# Patient Record
Sex: FEMALE | Race: WHITE | NOT HISPANIC OR LATINO | Employment: FULL TIME | URBAN - NONMETROPOLITAN AREA
[De-identification: names, ages, dates, MRNs, and addresses within clinical notes are randomized per-mention and may not be internally consistent; named-entity substitution may affect disease eponyms.]

---

## 2017-03-06 ENCOUNTER — OFFICE VISIT (OUTPATIENT)
Dept: FAMILY MEDICINE | Facility: OTHER | Age: 27
End: 2017-03-06
Attending: FAMILY MEDICINE
Payer: COMMERCIAL

## 2017-03-06 VITALS
BODY MASS INDEX: 32.49 KG/M2 | HEART RATE: 98 BPM | SYSTOLIC BLOOD PRESSURE: 124 MMHG | DIASTOLIC BLOOD PRESSURE: 72 MMHG | HEIGHT: 65 IN | WEIGHT: 195 LBS | RESPIRATION RATE: 20 BRPM | OXYGEN SATURATION: 98 %

## 2017-03-06 DIAGNOSIS — Z00.00 ROUTINE GENERAL MEDICAL EXAMINATION AT A HEALTH CARE FACILITY: Primary | ICD-10-CM

## 2017-03-06 DIAGNOSIS — Z30.41 VISIT FOR BIRTH CONTROL PILLS MAINTENANCE: ICD-10-CM

## 2017-03-06 PROCEDURE — 99395 PREV VISIT EST AGE 18-39: CPT | Performed by: FAMILY MEDICINE

## 2017-03-06 PROCEDURE — G0123 SCREEN CERV/VAG THIN LAYER: HCPCS | Performed by: FAMILY MEDICINE

## 2017-03-06 ASSESSMENT — ANXIETY QUESTIONNAIRES
7. FEELING AFRAID AS IF SOMETHING AWFUL MIGHT HAPPEN: NOT AT ALL
2. NOT BEING ABLE TO STOP OR CONTROL WORRYING: NOT AT ALL
5. BEING SO RESTLESS THAT IT IS HARD TO SIT STILL: NOT AT ALL
6. BECOMING EASILY ANNOYED OR IRRITABLE: NOT AT ALL
1. FEELING NERVOUS, ANXIOUS, OR ON EDGE: NOT AT ALL
3. WORRYING TOO MUCH ABOUT DIFFERENT THINGS: NOT AT ALL
IF YOU CHECKED OFF ANY PROBLEMS ON THIS QUESTIONNAIRE, HOW DIFFICULT HAVE THESE PROBLEMS MADE IT FOR YOU TO DO YOUR WORK, TAKE CARE OF THINGS AT HOME, OR GET ALONG WITH OTHER PEOPLE: NOT DIFFICULT AT ALL
GAD7 TOTAL SCORE: 0

## 2017-03-06 ASSESSMENT — PAIN SCALES - GENERAL: PAINLEVEL: NO PAIN (0)

## 2017-03-06 ASSESSMENT — PATIENT HEALTH QUESTIONNAIRE - PHQ9: 5. POOR APPETITE OR OVEREATING: NOT AT ALL

## 2017-03-06 NOTE — LETTER
[facility] [place]  [address]  [facility] [address]  [phone]        March 13, 2017    Fawn Davis  [address] [place] [address]          Dear Fawn Davis    Your pap smear is normal.  It is generally recommended that women have a yearly pelvic exam.  If your provider  requested that you have a pap smear more frequently because of any previous problems please schedule that appointment as requested.  If you have any questions please call the clinic at [phone].      Sincerely,        Fransisca Sales MD

## 2017-03-06 NOTE — PROGRESS NOTES
[facility]  Family Practice Well Woman Exam          HPI:   Fawn Davis, 26 year old, female presents with   Chief Complaint   Patient presents with     Physical  Doing well with no concerns. Continuing on Shree bcp. After switching from generic to name brand she noted that she was more irritable but this has now resolved. No break through bleeding              PMH:     Past Medical History   Diagnosis Date     Contraceptive use              PSH:     Past Surgical History   Procedure Laterality Date     Fractured arm       Tonsillectomy       Left and right bunionectomy       [place] teeth extraction       Bilateral > pe tubes              Medications:     Current Outpatient Prescriptions Ordered in Epic   Medication     drospirenone-ethinyl estradiol (SHREE) 3-0.02 MG per tablet     folic acid (FOLVITE) 1 MG tablet     No current Epic-ordered facility-administered medications on file.              Allergies:   Penicillins          Social History     Social History     Social History     Marital status:      Spouse name: N/A     Number of children: N/A     Years of education: N/A     Occupational History     Not on file.     Social History Main Topics     Smoking status: Never Smoker     Smokeless tobacco: Never Used     Alcohol use Yes      Comment: Socially     Drug use: No     Sexual activity: Not on file     Other Topics Concern      Service No     Caffeine Concern Yes     SODA     Hobby Hazards No     Weight Concern No     Back Care No     Seat Belt Yes     Parent/Sibling W/ Cabg, Mi Or Angioplasty Before 65f 55m? No     Social History Narrative              Family History     Family History   Problem Relation Age of Onset     HEART DISEASE Sister      murmer     Hyperlipidemia Mother             Review of Systems:   The 10 point Review of Systems is negative other than noted in the HPI             Physical Exam:   Vitals were reviewed  Blood pressure 124/72, pulse 98, resp. rate  "20, height 5' 5\" (1.651 m), weight 195 lb (88.5 kg), last menstrual period 02/27/2017, SpO2 98 %, unknown if currently breastfeeding.    Constitutional:   awake, alert, cooperative, no apparent distress, and appears stated age   Eyes:   Lids and lashes normal, pupils equal, round and reactive to light, extra ocular muscles intact, sclera clear, conjunctiva normal   ENT:   Normocephalic, without obvious abnormality, atraumatic, sinuses nontender on palpation, external ears without lesions, oral pharynx with moist mucous membranes, tonsils without erythema or exudates, gums normal and good dentition.   Neck:   Supple, symmetrical, trachea midline, no adenopathy, thyroid symmetric, not enlarged and no tenderness, skin normal   Hematologic / Lymphatic:   no cervical lymphadenopathy and no supraclavicular lymphadenopathy   Back:   Symmetric, no curvature, spinous processes are non-tender on palpation, paraspinous muscles are non-tender on palpation, no costal vertebral tenderness   Lungs:   No increased work of breathing, good air exchange, clear to auscultation bilaterally, no crackles or wheezing   Cardiovascular:   Normal apical impulse, regular rate and rhythm, normal S1 and S2, no S3 or S4, and no murmur noted   Chest / Breast:   Breasts symmetrical, skin without lesion(s), no nipple retraction or dimpling, no nipple discharge, no masses palpated, no axillary or supraclavicular adenopathy     Abdomen:   No scars, normal bowel sounds, soft, non-distended, non-tender, no masses palpated, no hepatosplenomegally   Genitounirinary:   External Genitalia:  General appearance; normal, Hair distribution; normal, Lesions absent  Vagina:  General appearance normal, Estrogen effect normal, Discharge absent  Cervix:  General appearance normal, Lesions absent, Discharge absent  Uterus:  Size normal, Contour normal, Position normal  Adenexa:  Tenderness absent  Anus/Perineum:  Lesions absent   Musculoskeletal:   There is no " redness, warmth, or swelling of the joints.  Full range of motion noted.  Motor strength is 5 out of 5 all extremities bilaterally.  Tone is normal.   Neurologic:   Awake, alert, oriented to name, place and time.  Cranial nerves II-XII are grossly intact.  Motor is 5 out of 5 bilaterally.  Cerebellar finger to nose, heel to shin intact.  Sensory is intact.  Babinski down going, Romberg negative, and gait is normal.   Neuropsychiatric:   General: normal, calm and normal eye contact   Skin:   no redness, warmth, or swelling          Data:                Assessment and Plan:     ICD-10-CM    1. Routine general medical examination at a health care facility Z00.00 Doing well, pap done. Recheck one year   2. Visit for birth control pills maintenance Z30.41 Continue folic acid as well which we discussed        Fransisca Sales MD  3/6/2017  9:24 AM

## 2017-03-06 NOTE — NURSING NOTE
"Chief Complaint   Patient presents with     Physical       Initial /72  Pulse 98  Resp 20  Ht 5' 5\" (1.651 m)  Wt 195 lb (88.5 kg)  LMP 02/27/2017 (Approximate)  SpO2 98%  Breastfeeding? Unknown  BMI 32.45 kg/m2 Estimated body mass index is 32.45 kg/(m^2) as calculated from the following:    Height as of this encounter: 5' 5\" (1.651 m).    Weight as of this encounter: 195 lb (88.5 kg).  Medication Reconciliation: complete   Keila Graham      "

## 2017-03-06 NOTE — MR AVS SNAPSHOT
"              After Visit Summary   3/6/2017    Fawn Davis    MRN: [id number]           Patient Information     Date Of Birth          1990        Visit Information        Provider Department      3/6/2017 9:00 AM Fransisca Sales MD [facility]        Today's Diagnoses     Routine general medical examination at a health care facility    -  1    Visit for birth control pills maintenance           Follow-ups after your visit        Follow-up notes from your care team     Return in about 1 year (around 3/6/2018).      Who to contact     If you have questions or need follow up information about today's clinic visit or your schedule please contact [facility] directly at [phone].  Normal or non-critical lab and imaging results will be communicated to you by MyChart, letter or phone within 4 business days after the clinic has received the results. If you do not hear from us within 7 days, please contact the clinic through MyChart or phone. If you have a critical or abnormal lab result, we will notify you by phone as soon as possible.  Submit refill requests through [employer] or call your pharmacy and they will forward the refill request to us. Please allow 3 business days for your refill to be completed.          Additional Information About Your Visit        MyChart Information     [employer] lets you send messages to your doctor, view your test results, renew your prescriptions, schedule appointments and more. To sign up, go to [email or web address] . Click on \"Log in\" on the left side of the screen, which will take you to the Welcome page. Then click on \"Sign up Now\" on the right side of the page.     You will be asked to enter the access code listed below, as well as some personal information. Please follow the directions to create your username and password.     Your access code is: [email or web address]  Expires: 2017  9:28 AM     Your access code will  in 90 days. If you need " "help or a new code, please call your [facility] clinic or [phone].        Care EveryWhere ID     This is your Care EveryWhere ID. This could be used by other organizations to access your [facility] medical records  [id number]        Your Vitals Were     Pulse Respirations Height Last Period Pulse Oximetry Breastfeeding?    98 20 5' 5\" (1.651 m) 02/27/2017 (Approximate) 98% Unknown    BMI (Body Mass Index)                   32.45 kg/m2            Blood Pressure from Last 3 Encounters:   03/06/17 124/72   02/23/16 120/78   01/26/16 138/80    Weight from Last 3 Encounters:   03/06/17 195 lb (88.5 kg)   02/23/16 192 lb (87.1 kg)   01/26/16 188 lb (85.3 kg)              We Performed the Following     A pap thin layer screen reflex to HPV if ASCUS - recommend age 25 - 29        Primary Care Provider Office Phone # Fax #    Fransisca Sales [phone]       [facility] [place] [address]  [place] [address]        Thank you!     Thank you for choosing [facility] [place]  for your care. Our goal is always to provide you with excellent care. Hearing back from our patients is one way we can continue to improve our services. Please take a few minutes to complete the written survey that you may receive in the mail after your visit with us. Thank you!             Your Updated Medication List - Protect others around you: Learn how to safely use, store and throw away your medicines at www.disposemymeds.org.          This list is accurate as of: 3/6/17  9:35 AM.  Always use your most recent med list.                   Brand Name Dispense Instructions for use    drospirenone-ethinyl estradiol 3-0.02 MG per tablet    SHREE    84 tablet    Take 1 tablet by mouth daily       folic acid 1 MG tablet    FOLVITE    100 tablet    Take 1 tablet (1 mg) by mouth daily         "

## 2017-03-07 ASSESSMENT — PATIENT HEALTH QUESTIONNAIRE - PHQ9: SUM OF ALL RESPONSES TO PHQ QUESTIONS 1-9: 0

## 2017-03-07 ASSESSMENT — ANXIETY QUESTIONNAIRES: GAD7 TOTAL SCORE: 0

## 2017-03-13 LAB
COPATH REPORT: NORMAL
PAP: NORMAL

## 2017-04-11 DIAGNOSIS — Z30.011 ENCOUNTER FOR INITIAL PRESCRIPTION OF CONTRACEPTIVE PILLS: ICD-10-CM

## 2017-04-11 NOTE — TELEPHONE ENCOUNTER
Lenore      Last Written Prescription Date: 1/14/2017  Last Fill Quantity: 84,  # refills: 0   Last Office Visit with G, [facility] or [facility] prescribing provider: 3/06/2017

## 2017-04-12 RX ORDER — ETHINYL ESTRADIOL/DROSPIRENONE 0.02-3(28)
TABLET ORAL
Qty: 84 TABLET | Refills: 1 | Status: SHIPPED | OUTPATIENT
Start: 2017-04-12 | End: 2018-02-27

## 2018-02-27 ENCOUNTER — PRENATAL OFFICE VISIT (OUTPATIENT)
Dept: OBGYN | Facility: OTHER | Age: 28
End: 2018-02-27
Attending: OBSTETRICS & GYNECOLOGY
Payer: COMMERCIAL

## 2018-02-27 VITALS
SYSTOLIC BLOOD PRESSURE: 126 MMHG | HEIGHT: 65 IN | DIASTOLIC BLOOD PRESSURE: 77 MMHG | WEIGHT: 198 LBS | OXYGEN SATURATION: 98 % | HEART RATE: 81 BPM | BODY MASS INDEX: 32.99 KG/M2

## 2018-02-27 DIAGNOSIS — Z34.81 ENCOUNTER FOR SUPERVISION OF OTHER NORMAL PREGNANCY, FIRST TRIMESTER: Primary | ICD-10-CM

## 2018-02-27 PROCEDURE — 76817 TRANSVAGINAL US OBSTETRIC: CPT | Performed by: OBSTETRICS & GYNECOLOGY

## 2018-02-27 PROCEDURE — 99207 ZZC PRENATAL VISIT: CPT | Mod: 25 | Performed by: OBSTETRICS & GYNECOLOGY

## 2018-02-27 ASSESSMENT — PAIN SCALES - GENERAL: PAINLEVEL: NO PAIN (0)

## 2018-02-27 NOTE — PROGRESS NOTES
"  HPI:  Fawn Davis is a 27 year old female Gravida2 Para1 () Patient's last menstrual period was 2018. at Unknown, Estimated Date of Delivery: Data Unavailable.  She denies vaginal bleeding, nausea , vomiting and abdominal pain.   No other c/o.    Past Medical History:   Diagnosis Date     Contraceptive use        Past Surgical History:   Procedure Laterality Date     BILATERAL > PE TUBES       Fractured Arm       Left and Right Bunionectomy       TONSILLECTOMY       [place] Teeth Extraction         Allergies: Penicillins     EXAM:  Blood pressure 126/77, pulse 81, height 5' 5\" (1.651 m), weight 198 lb (89.8 kg), last menstrual period 2018, SpO2 98 %, unknown if currently breastfeeding.   BMI= Body mass index is 32.95 kg/(m^2).  General - pleasant female in no acute distress.  Abdomen - soft, nontender, nondistended, no hepatosplenomegaly.  Pelvic - EG: normal adult female, BUS: within normal limits,Rectovaginal - deferred.    US:    Transvaginal:Yes  Yolk sac present:Yes  CRL:  18mm  FCA present:Yes  EGA 8w 2d  MAURA:CWD          ASSESSMENT/PLAN:  (Z34.81) Encounter for supervision of other normal pregnancy, first trimester  (primary encounter diagnosis)  Comment: viable IUP with concordant dates  Plan: ABO/Rh type and screen, HIV Antigen Antibody         Combo, Rubella Antibody IgG Quantitative,         Hepatitis B surface antigen, Anti Treponema,         Urine Culture Aerobic Bacterial, UA with         Microscopic, CBC with platelets, Drug Screen         Urine (Range)         1st Trimester precautions and testing discussed.  New OB Labs ordered and exam scheduled.  Aneuploidy testing options discussed.  Patient has my card and phone number to call prn if problems in interim.    Romero Sanchez  "

## 2018-02-27 NOTE — NURSING NOTE
"Chief Complaint   Patient presents with     Prenatal Care     Pre New LMP- 1/3/18 GA- 7 weeks and 6 days       Initial /77 (BP Location: Left arm, Cuff Size: Adult Regular)  Pulse 81  Ht 5' 5\" (1.651 m)  Wt 198 lb (89.8 kg)  LMP 01/03/2018  SpO2 98%  BMI 32.95 kg/m2 Estimated body mass index is 32.95 kg/(m^2) as calculated from the following:    Height as of this encounter: 5' 5\" (1.651 m).    Weight as of this encounter: 198 lb (89.8 kg).  Medication Reconciliation: complete     Rain Salazar      "

## 2018-02-27 NOTE — MR AVS SNAPSHOT
After Visit Summary   2/27/2018    Fawn Davis    MRN: [id number]           Patient Information     Date Of Birth          1990        Visit Information        Provider Department      2/27/2018 11:00 AM [facility]        Today's Diagnoses     Encounter for supervision of other normal pregnancy, first trimester    -  1      Care Instructions    Patient has my card and phone number to call prn if problems in interim.          Follow-ups after your visit        Your next 10 appointments already scheduled     Mar 22, 2018  1:45 PM CDT   (Arrive by 1:30 PM)   New Prenatal with Pam Colón NP   [facility] [place] ([facility] [place] )    [address]  [place] [address]   [phone]              Future tests that were ordered for you today     Open Future Orders        Priority Expected Expires Ordered    ABO/Rh type and screen Routine 3/22/2018 5/27/2018 2/27/2018    HIV Antigen Antibody Combo Routine 3/22/2018 5/27/2018 2/27/2018    Rubella Antibody IgG Quantitative Routine 3/22/2018 5/27/2018 2/27/2018    Hepatitis B surface antigen Routine 3/22/2018 5/27/2018 2/27/2018    Anti Treponema Routine 3/22/2018 5/27/2018 2/27/2018    Urine Culture Aerobic Bacterial Routine 3/22/2018 5/27/2018 2/27/2018    UA with Microscopic Routine 3/22/2018 5/27/2018 2/27/2018    CBC with platelets Routine 3/22/2018 5/27/2018 2/27/2018    Drug Screen Urine (Range) Routine 3/22/2018 5/27/2018 2/27/2018            Who to contact     If you have questions or need follow up information about today's clinic visit or your schedule please contact [facility] directly at [phone].  Normal or non-critical lab and imaging results will be communicated to you by MyChart, letter or phone within 4 business days after the clinic has received the results. If you do not hear from us within 7 days, please contact the clinic through MyChart or phone. If you  "have a critical or abnormal lab result, we will notify you by phone as soon as possible.  Submit refill requests through [email or web address] or call your pharmacy and they will forward the refill request to us. Please allow 3 business days for your refill to be completed.          Additional Information About Your Visit        [employer] Information     [email or web address] lets you send messages to your doctor, view your test results, renew your prescriptions, schedule appointments and more. To sign up, go to [email or web address] . Click on \"Log in\" on the left side of the screen, which will take you to the Welcome page. Then click on \"Sign up Now\" on the right side of the page.     You will be asked to enter the access code listed below, as well as some personal information. Please follow the directions to create your username and password.     Your access code is: [email or web address]  Expires: 2018  1:13 PM     Your access code will  in 90 days. If you need help or a new code, please call your [facility] clinic or [phone].        Care EveryWhere ID     This is your Care EveryWhere ID. This could be used by other organizations to access your [place] medical records  [id number]        Your Vitals Were     Pulse Height Last Period Pulse Oximetry BMI (Body Mass Index)       81 5' 5\" (1.651 m) 2018 98% 32.95 kg/m2        Blood Pressure from Last 3 Encounters:   18 126/77   17 124/72   16 120/78    Weight from Last 3 Encounters:   18 198 lb (89.8 kg)   17 195 lb (88.5 kg)   16 192 lb (87.1 kg)              We Performed the Following     US OB TRANSVAGINAL        Primary Care Provider Office Phone # Fax #    Fransisca Sales [phone] [address] [address]        Equal Access to Services     NIALL BAUMANN : Holly Sellers, wanato manjarrez, qaybta matthewalrosa jesus. So [facility] " [phone].    ATENCIÓN: Si rolando roberson, tiene a perkins disposición servicios gratuitos de asistencia lingüística. Letitia al [phone].    We comply with applicable federal civil rights laws and [place] laws. We do not discriminate on the basis of race, color, national origin, age, disability, sex, sexual orientation, or gender identity.            Thank you!     [facility] you [facility]  [facility] your care. Our goal is always to provide you with excellent care. Hearing back from our patients is one way we can continue to improve our services. Please take a few minutes to complete the written survey that you may receive in the mail after your visit with us. Thank you!             Your Updated Medication List - Protect others around you: Learn how to safely use, store and throw away your medicines at www.disposemymeds.org.          This list is accurate as of 2/27/18  1:13 PM.  Always use your most recent med list.                   Brand Name Dispense Instructions for use Diagnosis    folic acid 1 MG tablet    FOLVITE    100 tablet    Take 1 tablet (1 mg) by mouth daily    Routine general medical examination at a health care facility

## 2018-03-22 ENCOUNTER — PRENATAL OFFICE VISIT (OUTPATIENT)
Dept: OBGYN | Facility: OTHER | Age: 28
End: 2018-03-22
Attending: NURSE PRACTITIONER
Payer: COMMERCIAL

## 2018-03-22 VITALS
WEIGHT: 197 LBS | SYSTOLIC BLOOD PRESSURE: 118 MMHG | HEIGHT: 65 IN | BODY MASS INDEX: 32.82 KG/M2 | HEART RATE: 85 BPM | DIASTOLIC BLOOD PRESSURE: 70 MMHG | OXYGEN SATURATION: 100 %

## 2018-03-22 DIAGNOSIS — O22.00 VARICOSE VEINS OF LOWER EXTREMITY DURING PREGNANCY, ANTEPARTUM: ICD-10-CM

## 2018-03-22 DIAGNOSIS — Z34.81 ENCOUNTER FOR SUPERVISION OF OTHER NORMAL PREGNANCY IN FIRST TRIMESTER: Primary | ICD-10-CM

## 2018-03-22 DIAGNOSIS — Z34.91 NORMAL PREGNANCY IN FIRST TRIMESTER: ICD-10-CM

## 2018-03-22 PROCEDURE — 99207 ZZC PRENATAL VISIT: CPT | Performed by: NURSE PRACTITIONER

## 2018-03-22 PROCEDURE — 87491 CHLMYD TRACH DNA AMP PROBE: CPT | Performed by: NURSE PRACTITIONER

## 2018-03-22 PROCEDURE — 87591 N.GONORRHOEAE DNA AMP PROB: CPT | Performed by: NURSE PRACTITIONER

## 2018-03-22 ASSESSMENT — PAIN SCALES - GENERAL: PAINLEVEL: NO PAIN (0)

## 2018-03-22 NOTE — NURSING NOTE
"Chief Complaint   Patient presents with     Prenatal Care     New OB 11 weeks 1 day       Initial /70  Pulse 85  Ht 5' 5\" (1.651 m)  Wt 197 lb (89.4 kg)  LMP 01/03/2018  SpO2 100%  BMI 32.78 kg/m2 Estimated body mass index is 32.78 kg/(m^2) as calculated from the following:    Height as of this encounter: 5' 5\" (1.651 m).    Weight as of this encounter: 197 lb (89.4 kg).  Medication Reconciliation: complete     12 Week Visit    Patient education provided on the following:  Benefits of breastfeeding  Importance of early skin-to-skin contact    We reviewed the [place] [employer] Prenatal Toolkit in the [facility] Resource Book.  Patient questions and concerns addressed and reviewed. Support and encouragement provided.      Estefania Busby    "

## 2018-03-22 NOTE — PROGRESS NOTES
"  HPI:  Fawn Davis is a 27 year old female Patient's last menstrual period was 01/03/2018. at 11w1d, Estimated Date of Delivery: Oct 10, 2018.  She denies vaginal bleeding, vomiting, abdominal pain and headaches. Increased stress as her father is currently very ill.  Increasing varicosities in lower legs.   No other c/o.    Past Medical History:   Diagnosis Date     Contraceptive use        Past Surgical History:   Procedure Laterality Date     BILATERAL > PE TUBES       Fractured Arm       Left and Right Bunionectomy       TONSILLECTOMY       [place] Teeth Extraction         Allergies: Penicillins     EXAM:  Blood pressure 118/70, pulse 85, height 5' 5\" (1.651 m), weight 197 lb (89.4 kg), last menstrual period 01/03/2018, SpO2 100 %, unknown if currently breastfeeding.   BMI= Body mass index is 32.78 kg/(m^2).  General - pleasant female in no acute distress.  Neck - supple without lymphadenopathy or thyromegaly.  Lungs - clear to auscultation bilaterally.  Heart - regular rate and rhythm without murmur.  Abdomen - soft, nontender, nondistended, no hepatosplenomegaly.  Pelvic - EG: normal adult female, BUS: within normal limits, Vagina: well rugated, no discharge, Cervix: no lesions or CMT, closed/long Uterus: gravid, consistant with dates, mobile, Adnexae: no masses or tenderness.  Rectovaginal - deferred.  Musculoskeletal - no gross deformities.  Neurological - normal strength, sensation, and mental status.    Doptones were 166    ASSESSMENT/PLAN:  (Z34.81) Encounter for supervision of other normal pregnancy in first trimester  (primary encounter diagnosis)  Comment: new OB physical  Plan: GC/Chlamydia by PCR - [facility]            (Z34.91) Normal pregnancy in first trimester  Comment:   Plan: RTC 4 weeks.      Weight gain and exercise during pregnancy was discussed at today's visit.  The patient will return to clinic in 4 weeks for continued prenatal care.  "

## 2018-03-22 NOTE — MR AVS SNAPSHOT
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  Normal or non-critical lab and imaging results will be communicated to you by MyChart, letter or phone within 4 business days after the clinic has received the results. If you do not hear from us within 7 days, please contact the clinic through [employer]lane[employer] or phone. If you have a critical or abnormal lab result, we will notify you by phone as soon as possible.  Submit refill requests through [employer] or call your pharmacy and they will forward the refill request to us. Please allow 3 business days for your refill to be completed.          Additional Information About Your Visit        Blacklane[employer] Information     [employer] lets you send messages to your doctor, view your test results, renew your prescriptions, schedule appointments and more. To sign up, go to [email or web address] . Click on \"Log in\" on the left side of the screen, which will take you to the Welcome page. Then click on \"Sign up Now\" on the right side of " "the page.     You will be asked to enter the access code listed below, as well as some personal information. Please follow the directions to create your username and password.     Your access code is: [email or web address]  Expires: 2018  2:13 PM     Your access code will  in 90 days. If you need help or a new code, please call your [place] clinic or [phone].        Care EveryWhere ID     This is your Care EveryWhere ID. This could be used by other organizations to access your [place] medical records  [id number]        Your Vitals Were     Pulse Height Last Period Pulse Oximetry BMI (Body Mass Index)       85 5' 5\" (1.651 m) 2018 100% 32.78 kg/m2        Blood Pressure from Last 3 Encounters:   18 118/70   18 126/77   17 124/72    Weight from Last 3 Encounters:   18 197 lb (89.4 kg)   18 198 lb (89.8 kg)   17 195 lb (88.5 kg)              We Performed the Following     GC/Chlamydia by PCR - HI,        Primary Care Provider Office Phone # Fax #    Fransisca Sales -[phone]       [facility] [address]        Equal Access to Services     [facility]: Hadii evelyn post hadasho Solinda, waaxda luqadaha, qaybta kaalmada adeegyada, rosa bay . So [facility] [phone].    ATENCIÓN: Si habla español, tiene a perkins disposición servicios gratuitos de asistencia lingüística. Llame al [phone].    We comply with applicable federal civil rights laws and [place] laws. We do not discriminate on the basis of race, color, national origin, age, disability, sex, sexual orientation, or gender identity.            Thank you!     Thank you for choosing [facility]  for your care. Our goal is always to provide you with excellent care. Hearing back from our patients is one way we can continue to improve our services. Please take a few minutes to complete the written survey that you may receive in the mail after " your visit with us. Thank you!             Your Updated Medication List - Protect others around you: Learn how to safely use, store and throw away your medicines at www.disposemymeds.org.          This list is accurate as of 3/22/18  2:11 PM.  Always use your most recent med list.                   Brand Name Dispense Instructions for use Diagnosis    folic acid 1 MG tablet    FOLVITE    100 tablet    Take 1 tablet (1 mg) by mouth daily    Routine general medical examination at a health care facility

## 2018-03-23 LAB
C TRACH DNA SPEC QL PROBE+SIG AMP: NOT DETECTED
N GONORRHOEA DNA SPEC QL PROBE+SIG AMP: NOT DETECTED
SPECIMEN SOURCE: NORMAL

## 2018-03-23 ASSESSMENT — PATIENT HEALTH QUESTIONNAIRE - PHQ9: SUM OF ALL RESPONSES TO PHQ QUESTIONS 1-9: 1

## 2018-04-19 ENCOUNTER — PRENATAL OFFICE VISIT (OUTPATIENT)
Dept: OBGYN | Facility: OTHER | Age: 28
End: 2018-04-19
Attending: NURSE PRACTITIONER
Payer: COMMERCIAL

## 2018-04-19 VITALS
HEIGHT: 65 IN | SYSTOLIC BLOOD PRESSURE: 120 MMHG | BODY MASS INDEX: 32.82 KG/M2 | OXYGEN SATURATION: 99 % | HEART RATE: 95 BPM | WEIGHT: 197 LBS | DIASTOLIC BLOOD PRESSURE: 74 MMHG

## 2018-04-19 DIAGNOSIS — Z34.92 NORMAL PREGNANCY IN SECOND TRIMESTER: Primary | ICD-10-CM

## 2018-04-19 PROCEDURE — 99207 ZZC PRENATAL VISIT: CPT | Performed by: NURSE PRACTITIONER

## 2018-04-19 ASSESSMENT — PAIN SCALES - GENERAL: PAINLEVEL: NO PAIN (0)

## 2018-04-19 NOTE — MR AVS SNAPSHOT
"              After Visit Summary   4/19/2018    Fawn Davis    MRN: [id number]           Patient Information     Date Of Birth          1990        Visit Information        Provider Department      4/19/2018 1:00 PM Pam Colón NP [facility] [place]        Today's Diagnoses     Normal pregnancy in second trimester    -  1      Care Instructions    RTC 4 weeks          Follow-ups after your visit        Your next 10 appointments already scheduled     May 17, 2018  1:00 PM CDT   (Arrive by 12:45 PM)   ESTABLISHED PRENATAL with Pam Colón NP   [facility] [place] ([facility] [place] )    [address]  [place] [address]   [phone]              Future tests that were ordered for you today     Open Future Orders        Priority Expected Expires Ordered    US OB > 14 Weeks Complete Single ([place]) Routine 5/24/2018 4/19/2019 4/19/2018            Who to contact     If you have questions or need follow up information about today's clinic visit or your schedule please contact [facility] directly at [phone].  Normal or non-critical lab and imaging results will be communicated to you by MyChart, letter or phone within 4 business days after the clinic has received the results. If you do not hear from us within 7 days, please contact the clinic through MyChart or phone. If you have a critical or abnormal lab result, we will notify you by phone as soon as possible.  Submit refill requests through [employer] or call your pharmacy and they will forward the refill request to us. Please allow 3 business days for your refill to be completed.          Additional Information About Your Visit        MyChart Information     [employer] lets you send messages to your doctor, view your test results, renew your prescriptions, schedule appointments and more. To sign up, go to [email or web address] . Click on \"Log in\" on the left side of the screen, which will take you to the " "Welcome page. Then click on \"Sign up Now\" on the right side of the page.     You will be asked to enter the access code listed below, as well as some personal information. Please follow the directions to create your username and password.     Your access code is: [email or web address]  Expires: 2018  2:13 PM     Your access code will  in 90 days. If you need help or a new code, please call your [place] clinic or [phone].        Care EveryWhere ID     This is your Care EveryWhere ID. This could be used by other organizations to access your [place] medical records  [id number]        Your Vitals Were     Pulse Height Last Period Pulse Oximetry BMI (Body Mass Index)       95 5' 5\" (1.651 m) 2018 99% 32.78 kg/m2        Blood Pressure from Last 3 Encounters:   18 120/74   18 118/70   18 126/77    Weight from Last 3 Encounters:   18 197 lb (89.4 kg)   18 197 lb (89.4 kg)   18 198 lb (89.8 kg)               Primary Care Provider Office Phone # Fax #    Fransisca Sales [phone]       [address] [place] [address]        Equal Access to Services     [facility]ARCUS BAUMANN AH: Hadii evelyn post hadasho Soomaali, waaxda luqadaha, qaybta kaalmada adeegyastu, rosa patel. So [facility] [phone].    ATENCIÓN: Si habla español, tiene a perkins disposición servicios gratuitos de asistencia lingüística. Llame al [phone].    We comply with applicable federal civil rights laws and [place] laws. We do not discriminate on the basis of race, color, national origin, age, disability, sex, sexual orientation, or gender identity.            Thank you!     Thank you for choosing [facility]  for your care. Our goal is always to provide you with excellent care. Hearing back from our patients is one way we can continue to improve our services. Please take a few minutes to complete the written survey that you may receive in the mail after " your visit with us. Thank you!             Your Updated Medication List - Protect others around you: Learn how to safely use, store and throw away your medicines at www.disposemymeds.org.          This list is accurate as of 4/19/18  1:10 PM.  Always use your most recent med list.                   Brand Name Dispense Instructions for use Diagnosis    folic acid 1 MG tablet    FOLVITE    100 tablet    Take 1 tablet (1 mg) by mouth daily    Routine general medical examination at a health care facility

## 2018-04-19 NOTE — NURSING NOTE
"Chief Complaint   Patient presents with     Prenatal Care     15 weeks 1 day       Initial /74  Pulse 95  Ht 5' 5\" (1.651 m)  Wt 197 lb (89.4 kg)  LMP 01/03/2018  SpO2 99%  BMI 32.78 kg/m2 Estimated body mass index is 32.78 kg/(m^2) as calculated from the following:    Height as of this encounter: 5' 5\" (1.651 m).    Weight as of this encounter: 197 lb (89.4 kg).  Medication Reconciliation: complete     Estefania Busby      "

## 2018-04-19 NOTE — PROGRESS NOTES
Doing well.  Declines screens.  Light flutters.  No n/v, cramping, or bleeding.  20 week anatomy survey ordered.  RTC in 4 weeks.

## 2018-05-17 ENCOUNTER — PRENATAL OFFICE VISIT (OUTPATIENT)
Dept: OBGYN | Facility: OTHER | Age: 28
End: 2018-05-17
Attending: NURSE PRACTITIONER
Payer: COMMERCIAL

## 2018-05-17 VITALS
WEIGHT: 200 LBS | BODY MASS INDEX: 33.32 KG/M2 | HEART RATE: 88 BPM | HEIGHT: 65 IN | SYSTOLIC BLOOD PRESSURE: 124 MMHG | OXYGEN SATURATION: 100 % | DIASTOLIC BLOOD PRESSURE: 68 MMHG

## 2018-05-17 DIAGNOSIS — Z34.92 NORMAL PREGNANCY IN SECOND TRIMESTER: Primary | ICD-10-CM

## 2018-05-17 PROCEDURE — 99207 ZZC PRENATAL VISIT: CPT | Performed by: NURSE PRACTITIONER

## 2018-05-17 ASSESSMENT — PAIN SCALES - GENERAL: PAINLEVEL: NO PAIN (0)

## 2018-05-17 NOTE — NURSING NOTE
"Chief Complaint   Patient presents with     Prenatal Care     19 weeks 1 day       Initial /68 (BP Location: Right arm, Patient Position: Sitting, Cuff Size: Adult Regular)  Pulse 88  Ht 5' 5\" (1.651 m)  Wt 200 lb (90.7 kg)  LMP 01/03/2018  SpO2 100%  BMI 33.28 kg/m2 Estimated body mass index is 33.28 kg/(m^2) as calculated from the following:    Height as of this encounter: 5' 5\" (1.651 m).    Weight as of this encounter: 200 lb (90.7 kg).  Medication Reconciliation: complete    Estefania Busby LPN    "

## 2018-05-17 NOTE — MR AVS SNAPSHOT
After Visit Summary   5/17/2018    Fawn Davis    MRN: [id number]           Patient Information     Date Of Birth          1990        Visit Information        Provider Department      5/17/2018 1:00 PM Pam Colón, NP [facility]        Today's Diagnoses     Normal pregnancy in second trimester    -  1      Care Instructions    RTC 4 weeks          Follow-ups after your visit        Your next 10 appointments already scheduled     May 23, 2018 11:00 AM CDT   (Arrive by 10:45 AM)   US OB > 14 WEEKS COMPLETE SINGLE with HIUS2   [facility] ([facility] )    [address] [facility] [address]   [phone]           Please bring a list of your medicines (including vitamins, minerals and over-the-counter drugs). Also, tell your doctor about any allergies you may have. Wear comfortable clothes and leave your valuables at home.  If you re less than 20 weeks drink four 8-ounce glasses of fluid an hour before your exam. If you need to empty your bladder before your exam, try to release only a little urine. Then, drink another glass of fluid.  You may have up to two family members in the exam room. If you bring a small child, an adult must be there to care for him or her.  Please call the Imaging Department at your exam site with any questions.            Jun 12, 2018  1:15 PM CDT   (Arrive by 1:00 PM)   ESTABLISHED PRENATAL with [facility] [place] [facility] [place] ([facility] [place] )    [address]  [facility] [address]   [phone]              Who to contact     If you have questions or need follow up information about today's clinic visit or your schedule please contact [facility] [place] directly at [phone].  Normal or non-critical lab and imaging results will be communicated to you by MyChart, letter or phone within 4 business days after the clinic has received the results. If you do not hear from us within 7 days, please  "contact the clinic through [employer] or phone. If you have a critical or abnormal lab result, we will notify you by phone as soon as possible.  Submit refill requests through [employer] or call your pharmacy and they will forward the refill request to us. Please allow 3 business days for your refill to be completed.          Additional Information About Your Visit        [employer] Information     [employer] lets you send messages to your doctor, view your test results, renew your prescriptions, schedule appointments and more. To sign up, go to [email or web address] . Click on \"Log in\" on the left side of the screen, which will take you to the Welcome page. Then click on \"Sign up Now\" on the right side of the page.     You will be asked to enter the access code listed below, as well as some personal information. Please follow the directions to create your username and password.     Your access code is: [email or web address]  Expires: 2018  2:13 PM     Your access code will  in 90 days. If you need help or a new code, please call your [place] clinic or [phone].        Care EveryWhere ID     This is your Care EveryWhere ID. This could be used by other organizations to access your [place] medical records  [id number]        Your Vitals Were     Pulse Height Last Period Pulse Oximetry BMI (Body Mass Index)       88 5' 5\" (1.651 m) 2018 100% 33.28 kg/m2        Blood Pressure from Last 3 Encounters:   18 124/68   18 120/74   18 118/70    Weight from Last 3 Encounters:   18 200 lb (90.7 kg)   18 197 lb (89.4 kg)   18 197 lb (89.4 kg)              Today, you had the following     No orders found for display       Primary Care Provider Office Phone # Fax #    Fransisca Sales [phone] [address] [facility] [address]        Equal Access to Services     NIALL BAUMANN AH: Holly Sellers, gian manjarrez, qaybta rosa pacheco " edu sarmientogeoffrey marlamiryam lavaleriafelipe ah. So [facility] [phone].    ATENCIÓN: Si habla karol, tiene a perknis disposición servicios gratuitos de asistencia lingüística. Letitia al [phone].    We comply with applicable federal civil rights laws and [place] laws. We do not discriminate on the basis of race, color, national origin, age, disability, sex, sexual orientation, or gender identity.            Thank you!     Thank you for choosing [facility]  for your care. Our goal is always to provide you with excellent care. Hearing back from our patients is one way we can continue to improve our services. Please take a few minutes to complete the written survey that you may receive in the mail after your visit with us. Thank you!             Your Updated Medication List - Protect others around you: Learn how to safely use, store and throw away your medicines at www.disposemymeds.org.          This list is accurate as of 5/17/18  1:17 PM.  Always use your most recent med list.                   Brand Name Dispense Instructions for use Diagnosis    folic acid 1 MG tablet    FOLVITE    100 tablet    Take 1 tablet (1 mg) by mouth daily    Routine general medical examination at a health care facility

## 2018-05-23 ENCOUNTER — HOSPITAL ENCOUNTER (OUTPATIENT)
Dept: ULTRASOUND IMAGING | Facility: HOSPITAL | Age: 28
Discharge: HOME | End: 2018-05-23
Attending: NURSE PRACTITIONER | Admitting: NURSE PRACTITIONER
Payer: COMMERCIAL

## 2018-05-23 DIAGNOSIS — Z34.92 NORMAL PREGNANCY IN SECOND TRIMESTER: ICD-10-CM

## 2018-05-23 PROCEDURE — 76805 OB US >/= 14 WKS SNGL FETUS: CPT | Mod: TC

## 2018-05-25 DIAGNOSIS — Z34.92 NORMAL PREGNANCY IN SECOND TRIMESTER: Primary | ICD-10-CM

## 2018-05-30 ENCOUNTER — HOSPITAL ENCOUNTER (OUTPATIENT)
Dept: ULTRASOUND IMAGING | Facility: HOSPITAL | Age: 28
Discharge: HOME | End: 2018-05-30
Attending: NURSE PRACTITIONER | Admitting: NURSE PRACTITIONER
Payer: COMMERCIAL

## 2018-05-30 DIAGNOSIS — Z34.92 NORMAL PREGNANCY IN SECOND TRIMESTER: ICD-10-CM

## 2018-05-30 PROCEDURE — 76816 OB US FOLLOW-UP PER FETUS: CPT | Mod: TC

## 2018-05-31 DIAGNOSIS — R93.89 ABNORMAL ULTRASOUND: ICD-10-CM

## 2018-05-31 DIAGNOSIS — Z34.81 ENCOUNTER FOR SUPERVISION OF OTHER NORMAL PREGNANCY, FIRST TRIMESTER: ICD-10-CM

## 2018-05-31 DIAGNOSIS — R93.89 ABNORMAL ULTRASOUND: Primary | ICD-10-CM

## 2018-05-31 LAB
ERYTHROCYTE [DISTWIDTH] IN BLOOD BY AUTOMATED COUNT: 13.5 % (ref 10–15)
HCT VFR BLD AUTO: 35.4 % (ref 35–47)
HGB BLD-MCNC: 11.9 G/DL (ref 11.7–15.7)
MCH RBC QN AUTO: 29.9 PG (ref 26.5–33)
MCHC RBC AUTO-ENTMCNC: 33.6 G/DL (ref 31.5–36.5)
MCV RBC AUTO: 89 FL (ref 78–100)
PLATELET # BLD AUTO: 232 10E9/L (ref 150–450)
RBC # BLD AUTO: 3.98 10E12/L (ref 3.8–5.2)
WBC # BLD AUTO: 10 10E9/L (ref 4–11)

## 2018-05-31 PROCEDURE — 85027 COMPLETE CBC AUTOMATED: CPT | Performed by: NURSE PRACTITIONER

## 2018-05-31 PROCEDURE — 86762 RUBELLA ANTIBODY: CPT | Mod: 90 | Performed by: NURSE PRACTITIONER

## 2018-05-31 PROCEDURE — 86850 RBC ANTIBODY SCREEN: CPT | Performed by: OBSTETRICS & GYNECOLOGY

## 2018-05-31 PROCEDURE — 87340 HEPATITIS B SURFACE AG IA: CPT | Mod: 90 | Performed by: NURSE PRACTITIONER

## 2018-05-31 PROCEDURE — 36415 COLL VENOUS BLD VENIPUNCTURE: CPT | Performed by: NURSE PRACTITIONER

## 2018-05-31 PROCEDURE — 87389 HIV-1 AG W/HIV-1&-2 AB AG IA: CPT | Mod: 90 | Performed by: NURSE PRACTITIONER

## 2018-05-31 PROCEDURE — 40000791 ZZHCL STATISTIC VERIFI PRENATAL TRISOMY 21,18,13: Mod: 90 | Performed by: NURSE PRACTITIONER

## 2018-05-31 PROCEDURE — 86900 BLOOD TYPING SEROLOGIC ABO: CPT | Performed by: OBSTETRICS & GYNECOLOGY

## 2018-05-31 PROCEDURE — 99000 SPECIMEN HANDLING OFFICE-LAB: CPT | Performed by: NURSE PRACTITIONER

## 2018-05-31 PROCEDURE — 86901 BLOOD TYPING SEROLOGIC RH(D): CPT | Performed by: OBSTETRICS & GYNECOLOGY

## 2018-06-01 LAB
ABO + RH BLD: NORMAL
ABO + RH BLD: NORMAL
BLD GP AB SCN SERPL QL: NORMAL
BLOOD BANK CMNT PATIENT-IMP: NORMAL
SPECIMEN EXP DATE BLD: NORMAL

## 2018-06-02 LAB — RUBV IGG SERPL IA-ACNC: 27 IU/ML

## 2018-06-04 LAB
HBV SURFACE AG SERPL QL IA: NONREACTIVE
HIV 1+2 AB+HIV1 P24 AG SERPL QL IA: NONREACTIVE

## 2018-06-12 ENCOUNTER — PRENATAL OFFICE VISIT (OUTPATIENT)
Dept: OBGYN | Facility: OTHER | Age: 28
End: 2018-06-12
Attending: FAMILY MEDICINE
Payer: COMMERCIAL

## 2018-06-12 VITALS
HEIGHT: 65 IN | OXYGEN SATURATION: 99 % | BODY MASS INDEX: 31.82 KG/M2 | HEART RATE: 67 BPM | SYSTOLIC BLOOD PRESSURE: 112 MMHG | DIASTOLIC BLOOD PRESSURE: 64 MMHG | WEIGHT: 191 LBS

## 2018-06-12 DIAGNOSIS — Z34.92 NORMAL PREGNANCY IN SECOND TRIMESTER: Primary | ICD-10-CM

## 2018-06-12 PROCEDURE — 99207 ZZC PRENATAL VISIT: CPT | Performed by: NURSE PRACTITIONER

## 2018-06-12 ASSESSMENT — PAIN SCALES - GENERAL: PAINLEVEL: NO PAIN (0)

## 2018-06-12 NOTE — NURSING NOTE
"Chief Complaint   Patient presents with     Prenatal Care     22 weeks 6 days       Initial /64 (BP Location: Right arm, Patient Position: Sitting, Cuff Size: Adult Regular)  Pulse 67  Ht 5' 5\" (1.651 m)  Wt 191 lb (86.6 kg)  LMP 01/03/2018  SpO2 99%  BMI 31.78 kg/m2 Estimated body mass index is 31.78 kg/(m^2) as calculated from the following:    Height as of this encounter: 5' 5\" (1.651 m).    Weight as of this encounter: 191 lb (86.6 kg).  Medication Reconciliation: complete    Estefania Busby LPN    "

## 2018-06-12 NOTE — MR AVS SNAPSHOT
After Visit Summary   6/12/2018    Fawn Davis    MRN: [id number]           Patient Information     Date Of Birth          1990        Visit Information        Provider Department      6/12/2018 1:15 PM Pam Colón NP [facility] [place]        Today's Diagnoses     Normal pregnancy in second trimester    -  1      Care Instructions    RTC in 4 weeks          Follow-ups after your visit        Your next 10 appointments already scheduled     Jul 10, 2018 10:00 AM CDT   (Arrive by 9:45 AM)   ESTABLISHED PRENATAL with Pam Colón NP   [facility] [place] ([facility] [place] )    [address]  [place] [address]   [phone]              Future tests that were ordered for you today     Open Future Orders        Priority Expected Expires Ordered    Antibody screen red cell STAT 7/10/2018 7/24/2018 6/12/2018    CBC with platelets Routine 7/10/2018 7/24/2018 6/12/2018    Glucose tolerance gest screen 1 hour Routine 7/10/2018 7/24/2018 6/12/2018    Treponema Abs w Reflex to RPR and Titer Routine 7/10/2018 7/24/2018 6/12/2018            Who to contact     If you have questions or need follow up information about today's clinic visit or your schedule please contact [facility] directly at [phone].  Normal or non-critical lab and imaging results will be communicated to you by MyChart, letter or phone within 4 business days after the clinic has received the results. If you do not hear from us within 7 days, please contact the clinic through MyChart or phone. If you have a critical or abnormal lab result, we will notify you by phone as soon as possible.  Submit refill requests through [employer] or call your pharmacy and they will forward the refill request to us. Please allow 3 business days for your refill to be completed.          Additional Information About Your Visit        Care EveryWhere ID     This is your Care EveryWhere ID. This could be used by  "other organizations to access your [place] medical records  [id number]        Your Vitals Were     Pulse Height Last Period Pulse Oximetry BMI (Body Mass Index)       67 5' 5\" (1.651 m) 01/03/2018 99% 31.78 kg/m2        Blood Pressure from Last 3 Encounters:   06/12/18 112/64   05/17/18 124/68   04/19/18 120/74    Weight from Last 3 Encounters:   06/12/18 191 lb (86.6 kg)   05/17/18 200 lb (90.7 kg)   04/19/18 197 lb (89.4 kg)               Primary Care Provider Office Phone # Fax #    Fransisca Sales [phone] [address] [address]        Equal Access to Services     NIALL BAUMANN : Hadsugar corbino Solinda, waaxda luqadaha, qaybta kaalmada adeegyada, rosa bay . So [facility] [phone].    ATENCIÓN: Si habla español, tiene a perkins disposición servicios gratuitos de asistencia lingüística. Llame al [phone].    We comply with applicable federal civil rights laws and [place] laws. We do not discriminate on the basis of race, color, national origin, age, disability, sex, sexual orientation, or gender identity.            Thank you!     Thank you for choosing [facility]  for your care. Our goal is always to provide you with excellent care. Hearing back from our patients is one way we can continue to improve our services. Please take a few minutes to complete the written survey that you may receive in the mail after your visit with us. Thank you!             Your Updated Medication List - Protect others around you: Learn how to safely use, store and throw away your medicines at www.disposemymeds.org.          This list is accurate as of 6/12/18 11:59 PM.  Always use your most recent med list.                   Brand Name Dispense Instructions for use Diagnosis    folic acid 1 MG tablet    FOLVITE    100 tablet    Take 1 tablet (1 mg) by mouth daily    Routine general medical examination at a health care facility         "

## 2018-06-13 NOTE — PROGRESS NOTES
Doing well.  US and Progenity results reviewed. No cramping or bleeding.  Baby active.  Plan 28 week labs and Tdap next visit.  RTC in 4 weeks.

## 2018-07-10 ENCOUNTER — PRENATAL OFFICE VISIT (OUTPATIENT)
Dept: OBGYN | Facility: OTHER | Age: 28
End: 2018-07-10
Attending: NURSE PRACTITIONER
Payer: COMMERCIAL

## 2018-07-10 VITALS — BODY MASS INDEX: 32.45 KG/M2 | DIASTOLIC BLOOD PRESSURE: 62 MMHG | SYSTOLIC BLOOD PRESSURE: 100 MMHG | WEIGHT: 195 LBS

## 2018-07-10 DIAGNOSIS — Z34.81 ENCOUNTER FOR SUPERVISION OF OTHER NORMAL PREGNANCY, FIRST TRIMESTER: ICD-10-CM

## 2018-07-10 DIAGNOSIS — R73.09 ELEVATED GLUCOSE TOLERANCE TEST: Primary | ICD-10-CM

## 2018-07-10 DIAGNOSIS — Z67.91 RH NEGATIVE STATUS DURING PREGNANCY IN SECOND TRIMESTER: Primary | ICD-10-CM

## 2018-07-10 DIAGNOSIS — Z34.92 NORMAL PREGNANCY IN SECOND TRIMESTER: ICD-10-CM

## 2018-07-10 DIAGNOSIS — O26.892 RH NEGATIVE STATUS DURING PREGNANCY IN SECOND TRIMESTER: Primary | ICD-10-CM

## 2018-07-10 DIAGNOSIS — Z34.91 NORMAL PREGNANCY IN FIRST TRIMESTER: ICD-10-CM

## 2018-07-10 LAB
ALBUMIN UR-MCNC: NEGATIVE MG/DL
AMPHETAMINES UR QL SCN: NEGATIVE
APPEARANCE UR: ABNORMAL
BACTERIA #/AREA URNS HPF: ABNORMAL /HPF
BARBITURATES UR QL: NEGATIVE
BENZODIAZ UR QL: NEGATIVE
BILIRUB UR QL STRIP: NEGATIVE
BLD GP AB SCN SERPL QL: NORMAL
CANNABINOIDS UR QL SCN: NEGATIVE
COCAINE UR QL: NEGATIVE
COLOR UR AUTO: YELLOW
ERYTHROCYTE [DISTWIDTH] IN BLOOD BY AUTOMATED COUNT: 14.2 % (ref 10–15)
GLUCOSE 1H P 50 G GLC PO SERPL-MCNC: 139 MG/DL (ref 60–129)
GLUCOSE UR STRIP-MCNC: NEGATIVE MG/DL
HCT VFR BLD AUTO: 34.5 % (ref 35–47)
HGB BLD-MCNC: 11.5 G/DL (ref 11.7–15.7)
HGB UR QL STRIP: NEGATIVE
KETONES UR STRIP-MCNC: NEGATIVE MG/DL
LEUKOCYTE ESTERASE UR QL STRIP: ABNORMAL
MCH RBC QN AUTO: 29.9 PG (ref 26.5–33)
MCHC RBC AUTO-ENTMCNC: 33.3 G/DL (ref 31.5–36.5)
MCV RBC AUTO: 90 FL (ref 78–100)
METHADONE UR QL SCN: NEGATIVE
MUCOUS THREADS #/AREA URNS LPF: PRESENT /LPF
NITRATE UR QL: NEGATIVE
OPIATES UR QL SCN: NEGATIVE
PCP UR QL SCN: NEGATIVE
PH UR STRIP: 7 PH (ref 4.7–8)
PLATELET # BLD AUTO: 237 10E9/L (ref 150–450)
RBC # BLD AUTO: 3.85 10E12/L (ref 3.8–5.2)
RBC #/AREA URNS AUTO: 3 /HPF (ref 0–2)
SOURCE: ABNORMAL
SP GR UR STRIP: 1.01 (ref 1–1.03)
SQUAMOUS #/AREA URNS AUTO: 16 /HPF (ref 0–1)
UROBILINOGEN UR STRIP-MCNC: NORMAL MG/DL (ref 0–2)
WBC # BLD AUTO: 8.8 10E9/L (ref 4–11)
WBC #/AREA URNS AUTO: 22 /HPF (ref 0–5)
WBC CLUMPS #/AREA URNS HPF: PRESENT /HPF

## 2018-07-10 PROCEDURE — 99000 SPECIMEN HANDLING OFFICE-LAB: CPT | Performed by: NURSE PRACTITIONER

## 2018-07-10 PROCEDURE — 86780 TREPONEMA PALLIDUM: CPT | Mod: 90 | Performed by: NURSE PRACTITIONER

## 2018-07-10 PROCEDURE — 82950 GLUCOSE TEST: CPT | Performed by: NURSE PRACTITIONER

## 2018-07-10 PROCEDURE — 85027 COMPLETE CBC AUTOMATED: CPT | Performed by: NURSE PRACTITIONER

## 2018-07-10 PROCEDURE — 86850 RBC ANTIBODY SCREEN: CPT | Performed by: NURSE PRACTITIONER

## 2018-07-10 PROCEDURE — 36415 COLL VENOUS BLD VENIPUNCTURE: CPT | Performed by: NURSE PRACTITIONER

## 2018-07-10 PROCEDURE — 96372 THER/PROPH/DIAG INJ SC/IM: CPT | Performed by: NURSE PRACTITIONER

## 2018-07-10 PROCEDURE — 90471 IMMUNIZATION ADMIN: CPT | Performed by: NURSE PRACTITIONER

## 2018-07-10 PROCEDURE — 99207 ZZC PRENATAL VISIT: CPT | Mod: 25 | Performed by: NURSE PRACTITIONER

## 2018-07-10 PROCEDURE — 90715 TDAP VACCINE 7 YRS/> IM: CPT | Performed by: NURSE PRACTITIONER

## 2018-07-10 PROCEDURE — 87086 URINE CULTURE/COLONY COUNT: CPT | Performed by: OBSTETRICS & GYNECOLOGY

## 2018-07-10 PROCEDURE — 80307 DRUG TEST PRSMV CHEM ANLYZR: CPT | Performed by: OBSTETRICS & GYNECOLOGY

## 2018-07-10 PROCEDURE — 81001 URINALYSIS AUTO W/SCOPE: CPT | Mod: 59 | Performed by: OBSTETRICS & GYNECOLOGY

## 2018-07-10 ASSESSMENT — PAIN SCALES - GENERAL: PAINLEVEL: NO PAIN (0)

## 2018-07-10 NOTE — MR AVS SNAPSHOT
After Visit Summary   7/10/2018    Fawn Davis    MRN: [id number]           Patient Information     Date Of Birth          1990        Visit Information        Provider Department      7/10/2018 10:00 AM Pam Colón NP [facility] [place]        Today's Diagnoses     Normal pregnancy in first trimester          Care Instructions    RTC 2 weeks          Follow-ups after your visit        Your next 10 appointments already scheduled     Jul 26, 2018 11:30 AM CDT   (Arrive by 11:15 AM)   ESTABLISHED PRENATAL with Pam Colón NP   [facility] [place] ([facility] [place] )    [address]  [place] [address]   [phone]              Who to contact     If you have questions or need follow up information about today's clinic visit or your schedule please contact [facility] [place] directly at [phone].  Normal or non-critical lab and imaging results will be communicated to you by MyChart, letter or phone within 4 business days after the clinic has received the results. If you do not hear from us within 7 days, please contact the clinic through MyChart or phone. If you have a critical or abnormal lab result, we will notify you by phone as soon as possible.  Submit refill requests through [employer] or call your pharmacy and they will forward the refill request to us. Please allow 3 business days for your refill to be completed.          Additional Information About Your Visit        Care EveryWhere ID     This is your Care EveryWhere ID. This could be used by other organizations to access your [place] medical records  [id number]        Your Vitals Were     Last Period BMI (Body Mass Index)                01/03/2018 32.45 kg/m2           Blood Pressure from Last 3 Encounters:   07/10/18 100/62   06/12/18 112/64   05/17/18 124/68    Weight from Last 3 Encounters:   07/10/18 195 lb (88.5 kg)   06/12/18 191 lb (86.6 kg)   05/17/18 200 lb (90.7 kg)               Today, you had the following     No orders found for display       Primary Care Provider Office Phone # Fax #    Fransisca Sales [phone] [address] [address]        Equal Access to Services     NIALL BAUMANN : Hadii evelyn post shaquilleo Soterriali, waaxda luqadaha, qaybta kaalmada adegeoffreyyada, rosa jeanin hayaan torstengeoffrey cornell laGayeleonard patel. So [facility] [phone].    ATENCIÓN: Si habla español, tiene a perkins disposición servicios gratuitos de asistencia lingüística. Llame al [phone].    We comply with applicable federal civil rights laws and [place] laws. We do not discriminate on the basis of race, color, national origin, age, disability, sex, sexual orientation, or gender identity.            Thank you!     Thank you for choosing [facility]  for your care. Our goal is always to provide you with excellent care. Hearing back from our patients is one way we can continue to improve our services. Please take a few minutes to complete the written survey that you may receive in the mail after your visit with us. Thank you!             Your Updated Medication List - Protect others around you: Learn how to safely use, store and throw away your medicines at www.disposemymeds.org.          This list is accurate as of 7/10/18 10:30 AM.  Always use your most recent med list.                   Brand Name Dispense Instructions for use Diagnosis    folic acid 1 MG tablet    FOLVITE    100 tablet    Take 1 tablet (1 mg) by mouth daily    Routine general medical examination at a health care facility

## 2018-07-10 NOTE — PROGRESS NOTES
Denies concerns.  No cramping or jenniffer.  Baby active.  28 week labs and Tdap today.  Rhogam.  Third trimester changes reviewed.  RTC in 2 weeks.

## 2018-07-11 LAB
BACTERIA SPEC CULT: ABNORMAL
SPECIMEN SOURCE: ABNORMAL
T PALLIDUM AB SER QL: NONREACTIVE

## 2018-07-12 DIAGNOSIS — Z34.92 SUPERVISION OF NORMAL PREGNANCY IN SECOND TRIMESTER: Primary | ICD-10-CM

## 2018-07-16 LAB
GLU, 1 HOUR, 100 G: 169
GLU, 2 HOUR, 100 G: 145
GLU, 3 HOUR, 100 G: 90

## 2018-07-17 DIAGNOSIS — Z34.92 SUPERVISION OF NORMAL PREGNANCY IN SECOND TRIMESTER: ICD-10-CM

## 2018-07-17 DIAGNOSIS — R73.09 ELEVATED GLUCOSE TOLERANCE TEST: ICD-10-CM

## 2018-07-17 LAB
ALBUMIN UR-MCNC: NEGATIVE MG/DL
APPEARANCE UR: CLEAR
BACTERIA #/AREA URNS HPF: ABNORMAL /HPF
BILIRUB UR QL STRIP: NEGATIVE
COLOR UR AUTO: ABNORMAL
GLU, 1 HOUR, 100 G: 169
GLU, 2 HOUR, 100 G: 145
GLU, 3 HOUR, 100 G: 90
GLUCOSE 1H P 100 G GLC PO SERPL-MCNC: 169 MG/DL (ref 60–179)
GLUCOSE 2H P 100 G GLC PO SERPL-MCNC: 145 MG/DL (ref 60–154)
GLUCOSE 3H P 100 G GLC PO SERPL-MCNC: 90 MG/DL (ref 60–139)
GLUCOSE P FAST SERPL-MCNC: 86 MG/DL (ref 60–94)
GLUCOSE UR STRIP-MCNC: 300 MG/DL
HGB UR QL STRIP: NEGATIVE
KETONES UR STRIP-MCNC: 40 MG/DL
LEUKOCYTE ESTERASE UR QL STRIP: ABNORMAL
NITRATE UR QL: NEGATIVE
PH UR STRIP: 6.5 PH (ref 4.7–8)
RBC #/AREA URNS AUTO: 1 /HPF (ref 0–2)
SOURCE: ABNORMAL
SP GR UR STRIP: 1.01 (ref 1–1.03)
SQUAMOUS #/AREA URNS AUTO: 4 /HPF (ref 0–1)
UROBILINOGEN UR STRIP-MCNC: NORMAL MG/DL (ref 0–2)
WBC #/AREA URNS AUTO: 1 /HPF (ref 0–5)

## 2018-07-17 PROCEDURE — 82951 GLUCOSE TOLERANCE TEST (GTT): CPT | Performed by: NURSE PRACTITIONER

## 2018-07-17 PROCEDURE — 82952 GTT-ADDED SAMPLES: CPT | Performed by: NURSE PRACTITIONER

## 2018-07-17 PROCEDURE — 87086 URINE CULTURE/COLONY COUNT: CPT | Performed by: OBSTETRICS & GYNECOLOGY

## 2018-07-17 PROCEDURE — 36415 COLL VENOUS BLD VENIPUNCTURE: CPT | Performed by: NURSE PRACTITIONER

## 2018-07-17 PROCEDURE — 81001 URINALYSIS AUTO W/SCOPE: CPT | Performed by: OBSTETRICS & GYNECOLOGY

## 2018-07-18 LAB
BACTERIA SPEC CULT: NORMAL
SPECIMEN SOURCE: NORMAL

## 2018-07-26 ENCOUNTER — PRENATAL OFFICE VISIT (OUTPATIENT)
Dept: OBGYN | Facility: OTHER | Age: 28
End: 2018-07-26
Attending: FAMILY MEDICINE
Payer: COMMERCIAL

## 2018-07-26 VITALS
WEIGHT: 196 LBS | DIASTOLIC BLOOD PRESSURE: 72 MMHG | OXYGEN SATURATION: 100 % | BODY MASS INDEX: 32.65 KG/M2 | SYSTOLIC BLOOD PRESSURE: 114 MMHG | HEIGHT: 65 IN | HEART RATE: 66 BPM

## 2018-07-26 DIAGNOSIS — Z34.92 NORMAL PREGNANCY IN SECOND TRIMESTER: Primary | ICD-10-CM

## 2018-07-26 PROCEDURE — 99207 ZZC PRENATAL VISIT: CPT | Performed by: NURSE PRACTITIONER

## 2018-07-26 ASSESSMENT — PAIN SCALES - GENERAL: PAINLEVEL: NO PAIN (0)

## 2018-07-26 NOTE — NURSING NOTE
"Chief Complaint   Patient presents with     Prenatal Care     29 weeks 1 day       Initial /72 (BP Location: Right arm, Patient Position: Sitting, Cuff Size: Adult Regular)  Pulse 66  Ht 5' 5\" (1.651 m)  Wt 196 lb (88.9 kg)  LMP 01/03/2018  SpO2 100%  BMI 32.62 kg/m2 Estimated body mass index is 32.62 kg/(m^2) as calculated from the following:    Height as of this encounter: 5' 5\" (1.651 m).    Weight as of this encounter: 196 lb (88.9 kg).  Medication Reconciliation: complete    Estefania Busby LPN    "

## 2018-07-26 NOTE — MR AVS SNAPSHOT
"              After Visit Summary   7/26/2018    Fawn [place]    MRN: [id number]           Patient Information     Date Of Birth          1990        Visit Information        Provider Department      7/26/2018 11:30 AM Pam Colón NP [facility] [place]        Care Instructions    RTC 2 weeks          Follow-ups after your visit        Your next 10 appointments already scheduled     Aug 09, 2018 11:15 AM CDT   (Arrive by 11:00 AM)   ESTABLISHED PRENATAL with Pam Colón NP   [facility] [place] ([facility] [place] )    [address]  [place] [address]   [phone]              Who to contact     If you have questions or need follow up information about today's clinic visit or your schedule please contact [facility] directly at [phone].  Normal or non-critical lab and imaging results will be communicated to you by MyChart, letter or phone within 4 business days after the clinic has received the results. If you do not hear from us within 7 days, please contact the clinic through MyChart or phone. If you have a critical or abnormal lab result, we will notify you by phone as soon as possible.  Submit refill requests through [employer] or call your pharmacy and they will forward the refill request to us. Please allow 3 business days for your refill to be completed.          Additional Information About Your Visit        Care EveryWhere ID     This is your Care EveryWhere ID. This could be used by other organizations to access your [place] medical records  [id number]        Your Vitals Were     Pulse Height Last Period Pulse Oximetry BMI (Body Mass Index)       66 5' 5\" (1.651 m) 01/03/2018 100% 32.62 kg/m2        Blood Pressure from Last 3 Encounters:   07/26/18 114/72   07/10/18 100/62   06/12/18 112/64    Weight from Last 3 Encounters:   07/26/18 196 lb (88.9 kg)   07/10/18 195 lb (88.5 kg)   06/12/18 191 lb (86.6 kg)              Today, you had the following  "    No orders found for display       Primary Care Provider Office Phone # Fax #    Fransisca Sales [phone] [address] [address]        Equal Access to Services     NIALL BAUMANN : Holly post shaquilleo Soterriali, waaxda luqadaha, qaybta kaalmada adehany, rosa reynosofelipe sarmientogeoffrey cornell laGayeleonard patel. So [facility] [phone].    ATENCIÓN: Si habla español, tiene a perkins disposición servicios gratuitos de asistencia lingüística. Llame al [phone].    We comply with applicable federal civil rights laws and [place] laws. We do not discriminate on the basis of race, color, national origin, age, disability, sex, sexual orientation, or gender identity.            Thank you!     Thank you for choosing [facility]  for your care. Our goal is always to provide you with excellent care. Hearing back from our patients is one way we can continue to improve our services. Please take a few minutes to complete the written survey that you may receive in the mail after your visit with us. Thank you!             Your Updated Medication List - Protect others around you: Learn how to safely use, store and throw away your medicines at www.disposemymeds.org.          This list is accurate as of 7/26/18 12:59 PM.  Always use your most recent med list.                   Brand Name Dispense Instructions for use Diagnosis    folic acid 1 MG tablet    FOLVITE    100 tablet    Take 1 tablet (1 mg) by mouth daily    Routine general medical examination at a health care facility

## 2018-07-26 NOTE — PROGRESS NOTES
Doing well.  Denies  Concerns.  3 hour glucose reviewed.  Baby active.  No cramping or jenniffer.  Kick counts discussed.  RTC in 2 weeks.

## 2018-08-09 ENCOUNTER — PRENATAL OFFICE VISIT (OUTPATIENT)
Dept: OBGYN | Facility: OTHER | Age: 28
End: 2018-08-09
Attending: NURSE PRACTITIONER
Payer: COMMERCIAL

## 2018-08-09 VITALS
OXYGEN SATURATION: 100 % | WEIGHT: 198 LBS | SYSTOLIC BLOOD PRESSURE: 114 MMHG | HEART RATE: 73 BPM | BODY MASS INDEX: 32.99 KG/M2 | DIASTOLIC BLOOD PRESSURE: 68 MMHG | HEIGHT: 65 IN

## 2018-08-09 DIAGNOSIS — Z34.93 NORMAL PREGNANCY, THIRD TRIMESTER: Primary | ICD-10-CM

## 2018-08-09 PROCEDURE — 99207 ZZC PRENATAL VISIT: CPT | Performed by: NURSE PRACTITIONER

## 2018-08-09 ASSESSMENT — PAIN SCALES - GENERAL: PAINLEVEL: NO PAIN (0)

## 2018-08-09 NOTE — MR AVS SNAPSHOT
"              After Visit Summary   8/9/2018    Fawn [place]    MRN: [id number]           Patient Information     Date Of Birth          1990        Visit Information        Provider Department      8/9/2018 11:15 AM Pam Colón NP [facility] [place]        Today's Diagnoses     Normal pregnancy, third trimester    -  1      Care Instructions    RTC 2 weeks          Follow-ups after your visit        Your next 10 appointments already scheduled     Aug 21, 2018  1:00 PM CDT   (Arrive by 12:45 PM)   ESTABLISHED PRENATAL with Pam Colón NP   [facility] [place] ([facility] [place] )    [address]  [place] [address]   [phone]              Who to contact     If you have questions or need follow up information about today's clinic visit or your schedule please contact [facility] directly at [phone].  Normal or non-critical lab and imaging results will be communicated to you by MyChart, letter or phone within 4 business days after the clinic has received the results. If you do not hear from us within 7 days, please contact the clinic through MyChart or phone. If you have a critical or abnormal lab result, we will notify you by phone as soon as possible.  Submit refill requests through [employer] or call your pharmacy and they will forward the refill request to us. Please allow 3 business days for your refill to be completed.          Additional Information About Your Visit        Care EveryWhere ID     This is your Care EveryWhere ID. This could be used by other organizations to access your [place] medical records  [id number]        Your Vitals Were     Pulse Height Last Period Pulse Oximetry BMI (Body Mass Index)       73 5' 5\" (1.651 m) 01/03/2018 100% 32.95 kg/m2        Blood Pressure from Last 3 Encounters:   08/09/18 114/68   07/26/18 114/72   07/10/18 100/62    Weight from Last 3 Encounters:   08/09/18 198 lb (89.8 kg)   07/26/18 196 lb (88.9 kg) "   07/10/18 195 lb (88.5 kg)              Today, you had the following     No orders found for display       Primary Care Provider Office Phone # Fax #    Fransisca Sales [phone] [address] [facility] [address]        Equal Access to Services     [facility] : Hadii aad ku hadasho Soomaali, waaxda luqadaha, qaybta kaalmada adeegyada, waxay jeanin hayaan kelly gutiérrezjeffshaun lanaya ah. So [facility] [phone].    ATENCIÓN: Si habla español, tiene a perkins disposición servicios gratuitos de asistencia lingüística. Letitia al [phone].    We comply with applicable federal civil rights laws and [place] laws. We do not discriminate on the basis of race, color, national origin, age, disability, sex, sexual orientation, or gender identity.            Thank you!     Thank you for choosing [facility]  for your care. Our goal is always to provide you with excellent care. Hearing back from our patients is one way we can continue to improve our services. Please take a few minutes to complete the written survey that you may receive in the mail after your visit with us. Thank you!             Your Updated Medication List - Protect others around you: Learn how to safely use, store and throw away your medicines at www.disposemymeds.org.          This list is accurate as of 8/9/18 12:57 PM.  Always use your most recent med list.                   Brand Name Dispense Instructions for use Diagnosis    folic acid 1 MG tablet    FOLVITE    100 tablet    Take 1 tablet (1 mg) by mouth daily    Routine general medical examination at a health care facility

## 2018-08-09 NOTE — NURSING NOTE
"Chief Complaint   Patient presents with     Prenatal Care     31 weeks 1 day       Initial /68 (BP Location: Left arm, Patient Position: Sitting, Cuff Size: Adult Regular)  Pulse 73  Ht 5' 5\" (1.651 m)  Wt 198 lb (89.8 kg)  LMP 01/03/2018  SpO2 100%  BMI 32.95 kg/m2 Estimated body mass index is 32.95 kg/(m^2) as calculated from the following:    Height as of this encounter: 5' 5\" (1.651 m).    Weight as of this encounter: 198 lb (89.8 kg).  Medication Reconciliation: complete    Estefania Busby LPN    "

## 2018-08-21 ENCOUNTER — PRENATAL OFFICE VISIT (OUTPATIENT)
Dept: OBGYN | Facility: OTHER | Age: 28
End: 2018-08-21
Attending: NURSE PRACTITIONER
Payer: COMMERCIAL

## 2018-08-21 VITALS — BODY MASS INDEX: 33.28 KG/M2 | WEIGHT: 200 LBS | DIASTOLIC BLOOD PRESSURE: 68 MMHG | SYSTOLIC BLOOD PRESSURE: 112 MMHG

## 2018-08-21 DIAGNOSIS — Z34.93 NORMAL PREGNANCY, THIRD TRIMESTER: Primary | ICD-10-CM

## 2018-08-21 PROCEDURE — 99207 ZZC PRENATAL VISIT: CPT | Performed by: NURSE PRACTITIONER

## 2018-08-21 ASSESSMENT — PAIN SCALES - GENERAL: PAINLEVEL: NO PAIN (0)

## 2018-08-21 NOTE — MR AVS SNAPSHOT
After Visit Summary   8/21/2018    Fawn [place]    MRN: [id number]           Patient Information     Date Of Birth          1990        Visit Information        Provider Department      8/21/2018 1:00 PM Pam Colón NP [facility]        Today's Diagnoses     Normal pregnancy, third trimester    -  1      Care Instructions    RTC 2 weeks          Follow-ups after your visit        Your next 10 appointments already scheduled     Sep 06, 2018  2:00 PM CDT   (Arrive by 1:45 PM)   ESTABLISHED PRENATAL with Pam Colón NP   [facility] [place] ([facility] [place] )    [address]  [place] [address]   [phone]              Who to contact     If you have questions or need follow up information about today's clinic visit or your schedule please contact [facility] [place] directly at [phone].  Normal or non-critical lab and imaging results will be communicated to you by MyChart, letter or phone within 4 business days after the clinic has received the results. If you do not hear from us within 7 days, please contact the clinic through MyChart or phone. If you have a critical or abnormal lab result, we will notify you by phone as soon as possible.  Submit refill requests through [employer] or call your pharmacy and they will forward the refill request to us. Please allow 3 business days for your refill to be completed.          Additional Information About Your Visit        Care EveryWhere ID     This is your Care EveryWhere ID. This could be used by other organizations to access your [place] medical records  [id number]        Your Vitals Were     Last Period BMI (Body Mass Index)                01/03/2018 33.28 kg/m2           Blood Pressure from Last 3 Encounters:   08/21/18 112/68   08/09/18 114/68   07/26/18 114/72    Weight from Last 3 Encounters:   08/21/18 200 lb (90.7 kg)   08/09/18 198 lb (89.8 kg)   07/26/18 196 lb (88.9 kg)               Today, you had the following     No orders found for display       Primary Care Provider Office Phone # Fax #    Fransisca Sales [phone] [address] [address]        Equal Access to Services     NIALL BAUMANN : Hadii evelyn post shaquilleo Soterriali, waaxda luqadaha, qaybta kaalmada adegeoffreyyada, rosa jeanin hayaan torstengeoffrey cornell laGayeleonard patel. So [facility] [phone].    ATENCIÓN: Si habla español, tiene a perkins disposición servicios gratuitos de asistencia lingüística. Llame al [phone].    We comply with applicable federal civil rights laws and [place] laws. We do not discriminate on the basis of race, color, national origin, age, disability, sex, sexual orientation, or gender identity.            Thank you!     Thank you for choosing [facility]  for your care. Our goal is always to provide you with excellent care. Hearing back from our patients is one way we can continue to improve our services. Please take a few minutes to complete the written survey that you may receive in the mail after your visit with us. Thank you!             Your Updated Medication List - Protect others around you: Learn how to safely use, store and throw away your medicines at www.disposemymeds.org.          This list is accurate as of 8/21/18  2:31 PM.  Always use your most recent med list.                   Brand Name Dispense Instructions for use Diagnosis    folic acid 1 MG tablet    FOLVITE    100 tablet    Take 1 tablet (1 mg) by mouth daily    Routine general medical examination at a health care facility

## 2018-09-06 ENCOUNTER — PRENATAL OFFICE VISIT (OUTPATIENT)
Dept: OBGYN | Facility: OTHER | Age: 28
End: 2018-09-06
Attending: NURSE PRACTITIONER
Payer: COMMERCIAL

## 2018-09-06 VITALS
BODY MASS INDEX: 33.66 KG/M2 | SYSTOLIC BLOOD PRESSURE: 122 MMHG | OXYGEN SATURATION: 98 % | HEART RATE: 97 BPM | WEIGHT: 202 LBS | DIASTOLIC BLOOD PRESSURE: 74 MMHG | HEIGHT: 65 IN

## 2018-09-06 DIAGNOSIS — Z34.93 NORMAL PREGNANCY IN THIRD TRIMESTER: Primary | ICD-10-CM

## 2018-09-06 PROCEDURE — 87653 STREP B DNA AMP PROBE: CPT | Performed by: NURSE PRACTITIONER

## 2018-09-06 PROCEDURE — 99207 ZZC PRENATAL VISIT: CPT | Performed by: NURSE PRACTITIONER

## 2018-09-06 ASSESSMENT — PAIN SCALES - GENERAL: PAINLEVEL: NO PAIN (0)

## 2018-09-06 NOTE — NURSING NOTE
"Chief Complaint   Patient presents with     Prenatal Care     35 weeks 1 day       Initial /74 (BP Location: Right arm, Patient Position: Sitting, Cuff Size: Adult Regular)  Pulse 97  Ht 5' 5\" (1.651 m)  Wt 202 lb (91.6 kg)  LMP 01/03/2018  SpO2 98%  BMI 33.61 kg/m2 Estimated body mass index is 33.61 kg/(m^2) as calculated from the following:    Height as of this encounter: 5' 5\" (1.651 m).    Weight as of this encounter: 202 lb (91.6 kg).  Medication Reconciliation: complete     36 Week Visit    Patient education provided on the following:  Baby-led feeding  Exclusivity of breastfeeding for the first 6 months  The importance of exclusive breastfeeding  Non-pharmalogical pain relief methods for labor  Frequency of feeding in relation to establishing a milk supply  Continuation of breastfeeding after introduction of appropriate complimentary foods    We reviewed the [place] [employer] Prenatal Toolkit in the [facility] Resource Book.  Patient questions and concerns addressed and reviewed. Support and encouragement provided.          Estefania Busby LPN    "

## 2018-09-06 NOTE — PROGRESS NOTES
Doing well.  Baby active. GBS and 36 week Baby Friendly material completed today.  Reviewed Kick counts and late pregnancy changes.  No cramping, jenniffer, or LOF.  RTC in 1 week.

## 2018-09-06 NOTE — MR AVS SNAPSHOT
"              After Visit Summary   9/6/2018    Fawn [place]    MRN: [id number]           Patient Information     Date Of Birth          1990        Visit Information        Provider Department      9/6/2018 2:00 PM Pam Colón NP [facility] [place]        Today's Diagnoses     Normal pregnancy in third trimester    -  1      Care Instructions    RTC 1 week          Follow-ups after your visit        Your next 10 appointments already scheduled     Sep 14, 2018 10:45 AM CDT   (Arrive by 10:30 AM)   ESTABLISHED PRENATAL with Pam Colón NP   [facility] [place] ([facility] [place] )    [address]  [place] [address]   [phone]              Who to contact     If you have questions or need follow up information about today's clinic visit or your schedule please contact [facility] directly at [phone].  Normal or non-critical lab and imaging results will be communicated to you by MyChart, letter or phone within 4 business days after the clinic has received the results. If you do not hear from us within 7 days, please contact the clinic through MyChart or phone. If you have a critical or abnormal lab result, we will notify you by phone as soon as possible.  Submit refill requests through [employer] or call your pharmacy and they will forward the refill request to us. Please allow 3 business days for your refill to be completed.          Additional Information About Your Visit        Care EveryWhere ID     This is your Care EveryWhere ID. This could be used by other organizations to access your [place] medical records  [id number]        Your Vitals Were     Pulse Height Last Period Pulse Oximetry BMI (Body Mass Index)       97 5' 5\" (1.651 m) 01/03/2018 98% 33.61 kg/m2        Blood Pressure from Last 3 Encounters:   09/06/18 122/74   08/21/18 112/68   08/09/18 114/68    Weight from Last 3 Encounters:   09/06/18 202 lb (91.6 kg)   08/21/18 200 lb (90.7 kg) "   08/09/18 198 lb (89.8 kg)              We Performed the Following     Strep, Group B by PCR        Primary Care Provider Office Phone # Fax #    Fransisca Sales [phone] [address] [facility] [address]        Equal Access to Services     [facility] PASTOR : Hadii evelyn ku hadasho Soomaali, waaxda luqadaha, qaybta kaalmada adeegyada, waxay jeanin hayaan kelly gutiérrezjeffshaun bay . So [facility] [phone].    ATENCIÓN: Si habla español, tiene a perkins disposición servicios gratuitos de asistencia lingüística. Llame al [phone].    We comply with applicable federal civil rights laws and [place] laws. We do not discriminate on the basis of race, color, national origin, age, disability, sex, sexual orientation, or gender identity.            Thank you!     Thank you for choosing [facility]  for your care. Our goal is always to provide you with excellent care. Hearing back from our patients is one way we can continue to improve our services. Please take a few minutes to complete the written survey that you may receive in the mail after your visit with us. Thank you!             Your Updated Medication List - Protect others around you: Learn how to safely use, store and throw away your medicines at www.disposemymeds.org.          This list is accurate as of 9/6/18  2:25 PM.  Always use your most recent med list.                   Brand Name Dispense Instructions for use Diagnosis    folic acid 1 MG tablet    FOLVITE    100 tablet    Take 1 tablet (1 mg) by mouth daily    Routine general medical examination at a health care facility

## 2018-09-07 LAB
GP B STREP DNA SPEC QL NAA+PROBE: POSITIVE
SPECIMEN SOURCE: ABNORMAL

## 2018-09-11 DIAGNOSIS — Z88.0 PENICILLIN ALLERGY: ICD-10-CM

## 2018-09-11 DIAGNOSIS — B95.1 POSITIVE GBS TEST: Primary | ICD-10-CM

## 2018-09-14 ENCOUNTER — PRENATAL OFFICE VISIT (OUTPATIENT)
Dept: OBGYN | Facility: OTHER | Age: 28
End: 2018-09-14
Attending: NURSE PRACTITIONER
Payer: COMMERCIAL

## 2018-09-14 VITALS
WEIGHT: 203 LBS | SYSTOLIC BLOOD PRESSURE: 124 MMHG | HEART RATE: 105 BPM | BODY MASS INDEX: 33.82 KG/M2 | HEIGHT: 65 IN | DIASTOLIC BLOOD PRESSURE: 76 MMHG | OXYGEN SATURATION: 98 %

## 2018-09-14 DIAGNOSIS — Z34.93 NORMAL PREGNANCY IN THIRD TRIMESTER: Primary | ICD-10-CM

## 2018-09-14 DIAGNOSIS — B95.1 POSITIVE GBS TEST: ICD-10-CM

## 2018-09-14 DIAGNOSIS — Z88.0 PENICILLIN ALLERGY: ICD-10-CM

## 2018-09-14 PROCEDURE — 87184 SC STD DISK METHOD PER PLATE: CPT | Performed by: NURSE PRACTITIONER

## 2018-09-14 PROCEDURE — 99207 ZZC PRENATAL VISIT: CPT | Performed by: NURSE PRACTITIONER

## 2018-09-14 ASSESSMENT — PAIN SCALES - GENERAL: PAINLEVEL: NO PAIN (0)

## 2018-09-14 NOTE — MR AVS SNAPSHOT
"              After Visit Summary   9/14/2018    Fawn [place]    MRN: [id number]           Patient Information     Date Of Birth          1990        Visit Information        Provider Department      9/14/2018 10:45 AM Pam Colón NP [facility] [place]        Today's Diagnoses     Normal pregnancy in third trimester    -  1      Care Instructions    RTC 1 week          Follow-ups after your visit        Your next 10 appointments already scheduled     Sep 21, 2018 10:30 AM CDT   (Arrive by 10:15 AM)   ESTABLISHED PRENATAL with Pam Colón NP   [facility] [place] ([facility] [place] )    [address]  [place] [address]   [phone]              Who to contact     If you have questions or need follow up information about today's clinic visit or your schedule please contact [facility] directly at [phone].  Normal or non-critical lab and imaging results will be communicated to you by MyChart, letter or phone within 4 business days after the clinic has received the results. If you do not hear from us within 7 days, please contact the clinic through MyChart or phone. If you have a critical or abnormal lab result, we will notify you by phone as soon as possible.  Submit refill requests through [employer] or call your pharmacy and they will forward the refill request to us. Please allow 3 business days for your refill to be completed.          Additional Information About Your Visit        Care EveryWhere ID     This is your Care EveryWhere ID. This could be used by other organizations to access your [place] medical records  [id number]        Your Vitals Were     Pulse Height Last Period Pulse Oximetry BMI (Body Mass Index)       105 5' 5\" (1.651 m) 01/03/2018 98% 33.78 kg/m2        Blood Pressure from Last 3 Encounters:   09/14/18 124/76   09/06/18 122/74   08/21/18 112/68    Weight from Last 3 Encounters:   09/14/18 203 lb (92.1 kg)   09/06/18 202 lb (91.6 kg) "   08/21/18 200 lb (90.7 kg)              Today, you had the following     No orders found for display       Primary Care Provider Office Phone # Fax #    Fransisca Sales [phone] [address] [facility] [address]        Equal Access to Services     [facility] : Hadii aad ku hadasho Soomaali, waaxda luqadaha, qaybta kaalmada adeegyada, waxay jeanin hayaan kelly gutiérrezjeffshaun lanaya . So [facility] [phone].    ATENCIÓN: Si habla español, tiene a perkins disposición servicios gratuitos de asistencia lingüística. Letitia al [phone].    We comply with applicable federal civil rights laws and [place] laws. We do not discriminate on the basis of race, color, national origin, age, disability, sex, sexual orientation, or gender identity.            Thank you!     Thank you for choosing [facility]  for your care. Our goal is always to provide you with excellent care. Hearing back from our patients is one way we can continue to improve our services. Please take a few minutes to complete the written survey that you may receive in the mail after your visit with us. Thank you!             Your Updated Medication List - Protect others around you: Learn how to safely use, store and throw away your medicines at www.disposemymeds.org.          This list is accurate as of 9/14/18 11:00 AM.  Always use your most recent med list.                   Brand Name Dispense Instructions for use Diagnosis    folic acid 1 MG tablet    FOLVITE    100 tablet    Take 1 tablet (1 mg) by mouth daily    Routine general medical examination at a health care facility

## 2018-09-14 NOTE — NURSING NOTE
"Chief Complaint   Patient presents with     Prenatal Care     36 weeks 2 days       Initial /76 (BP Location: Left arm, Patient Position: Sitting, Cuff Size: Adult Regular)  Pulse 105  Ht 5' 5\" (1.651 m)  Wt 203 lb (92.1 kg)  LMP 01/03/2018  SpO2 98%  BMI 33.78 kg/m2 Estimated body mass index is 33.78 kg/(m^2) as calculated from the following:    Height as of this encounter: 5' 5\" (1.651 m).    Weight as of this encounter: 203 lb (92.1 kg).  Medication Reconciliation: complete    Estefania Busby LPN    "

## 2018-09-14 NOTE — PROGRESS NOTES
Doing well. Occasional BH.  No bleeding or LOF. GBS + discussed and sensitivity in process. Signs of labor and when to come in reviewed.  RTC in 1 week.

## 2018-09-17 LAB
BACTERIA SPEC CULT: ABNORMAL
SPECIMEN SOURCE: ABNORMAL

## 2018-09-21 ENCOUNTER — PRENATAL OFFICE VISIT (OUTPATIENT)
Dept: OBGYN | Facility: OTHER | Age: 28
End: 2018-09-21
Attending: NURSE PRACTITIONER
Payer: COMMERCIAL

## 2018-09-21 VITALS — SYSTOLIC BLOOD PRESSURE: 108 MMHG | WEIGHT: 205 LBS | DIASTOLIC BLOOD PRESSURE: 68 MMHG | BODY MASS INDEX: 34.11 KG/M2

## 2018-09-21 DIAGNOSIS — Z34.93 NORMAL PREGNANCY IN THIRD TRIMESTER: Primary | ICD-10-CM

## 2018-09-21 PROCEDURE — 99207 ZZC PRENATAL VISIT: CPT | Performed by: NURSE PRACTITIONER

## 2018-09-21 ASSESSMENT — PAIN SCALES - GENERAL: PAINLEVEL: NO PAIN (0)

## 2018-09-21 NOTE — PROGRESS NOTES
Occasional BH.  No jenniffer, LOF, or spotting.  Baby active.  Reviewed late pregnancy warning signs and when to call / come in.  RTC in 1 week.

## 2018-09-21 NOTE — MR AVS SNAPSHOT
After Visit Summary   9/21/2018    Fawn Davis    MRN: [id number]           Patient Information     Date Of Birth          1990        Visit Information        Provider Department      9/21/2018 10:30 AM Pam Colón NP [facility]        Today's Diagnoses     Normal pregnancy in third trimester    -  1      Care Instructions    RTC 1 week          Follow-ups after your visit        Your next 10 appointments already scheduled     Sep 27, 2018  1:15 PM CDT   (Arrive by 1:00 PM)   ESTABLISHED PRENATAL with Romero Sanchez MD   [facility]    [address]  [place] [address]   [phone]              Who to contact     If you have questions or need follow up information about today's clinic visit or your schedule please contact [facility] directly at [phone].  Normal or non-critical lab and imaging results will be communicated to you by MyChart, letter or phone within 4 business days after the clinic has received the results. If you do not hear from us within 7 days, please contact the clinic through MyChart or phone. If you have a critical or abnormal lab result, we will notify you by phone as soon as possible.  Submit refill requests through [employer] or call your pharmacy and they will forward the refill request to us. Please allow 3 business days for your refill to be completed.          Additional Information About Your Visit        Care EveryWhere ID     This is your Care EveryWhere ID. This could be used by other organizations to access your [facility] medical records  [id number]        Your Vitals Were     Last Period BMI (Body Mass Index)                01/03/2018 34.11 kg/m2           Blood Pressure from Last 3 Encounters:   09/21/18 108/68   09/14/18 124/76   09/06/18 122/74    Weight from Last 3 Encounters:   09/21/18 205 lb (93 kg)   09/14/18 203 lb (92.1 kg)   09/06/18 202  lb (91.6 kg)              Today, you had the following     No orders found for display       Primary Care Provider Office Phone # Fax #    Fransisca Sales [phone] [address] [facility] [address]        Equal Access to Services     MARCUS PASTOR : Hadii aad ku hadjefo Soomaali, waaxda luqadaha, qaybta kaalmada adeegyada, waxay idiin hayaan adeeg khjeffshaun patel. So [facility] [phone].    ATENCIÓN: Si habla español, tiene a perkins disposición servicios gratuitos de asistencia lingüística. Llame al [phone].    We comply with applicable federal civil rights laws and [place] laws. We do not discriminate on the basis of race, color, national origin, age, disability, sex, sexual orientation, or gender identity.            Thank you!     Thank you for choosing [facility]  for your care. Our goal is always to provide you with excellent care. Hearing back from our patients is one way we can continue to improve our services. Please take a few minutes to complete the written survey that you may receive in the mail after your visit with us. Thank you!             Your Updated Medication List - Protect others around you: Learn how to safely use, store and throw away your medicines at www.disposemymeds.org.          This list is accurate as of 9/21/18 11:00 AM.  Always use your most recent med list.                   Brand Name Dispense Instructions for use Diagnosis    folic acid 1 MG tablet    FOLVITE    100 tablet    Take 1 tablet (1 mg) by mouth daily    Routine general medical examination at a health care facility

## 2018-09-27 ENCOUNTER — PRENATAL OFFICE VISIT (OUTPATIENT)
Dept: OBGYN | Facility: OTHER | Age: 28
End: 2018-09-27
Attending: OBSTETRICS & GYNECOLOGY
Payer: COMMERCIAL

## 2018-09-27 VITALS
BODY MASS INDEX: 34.49 KG/M2 | SYSTOLIC BLOOD PRESSURE: 100 MMHG | HEIGHT: 65 IN | OXYGEN SATURATION: 97 % | DIASTOLIC BLOOD PRESSURE: 68 MMHG | WEIGHT: 207 LBS | HEART RATE: 88 BPM

## 2018-09-27 DIAGNOSIS — Z34.93 NORMAL PREGNANCY IN THIRD TRIMESTER: ICD-10-CM

## 2018-09-27 PROCEDURE — 99207 ZZC PRENATAL VISIT: CPT | Performed by: OBSTETRICS & GYNECOLOGY

## 2018-09-27 ASSESSMENT — PAIN SCALES - GENERAL: PAINLEVEL: NO PAIN (0)

## 2018-09-27 NOTE — NURSING NOTE
"Chief Complaint   Patient presents with     Prenatal Care     38 weeks and 1 day       Initial /68 (BP Location: Left arm, Cuff Size: Adult Large)  Pulse 88  Ht 5' 5\" (1.651 m)  Wt 207 lb (93.9 kg)  LMP 01/03/2018  SpO2 97%  BMI 34.45 kg/m2 Estimated body mass index is 34.45 kg/(m^2) as calculated from the following:    Height as of this encounter: 5' 5\" (1.651 m).    Weight as of this encounter: 207 lb (93.9 kg).  Medication Reconciliation: complete    Rain Salazar LPN  "

## 2018-09-27 NOTE — MR AVS SNAPSHOT
"              After Visit Summary   9/27/2018    Fawn Davis    MRN: [id number]           Patient Information     Date Of Birth          1990        Visit Information        Provider Department      9/27/2018 1:15 PM Romero Sanchez MD [facility]        Today's Diagnoses     Normal pregnancy in third trimester          Care Instructions    RTC in 1 week          Follow-ups after your visit        Your next 10 appointments already scheduled     Oct 05, 2018  2:15 PM CDT   (Arrive by 2:00 PM)   ESTABLISHED PRENATAL with Pam Colón NP   [facility]    [address]  [place] [address]   [phone]              Who to contact     If you have questions or need follow up information about today's clinic visit or your schedule please contact [facility] directly at [phone].  Normal or non-critical lab and imaging results will be communicated to you by MyChart, letter or phone within 4 business days after the clinic has received the results. If you do not hear from us within 7 days, please contact the clinic through MyChart or phone. If you have a critical or abnormal lab result, we will notify you by phone as soon as possible.  Submit refill requests through [employer] or call your pharmacy and they will forward the refill request to us. Please allow 3 business days for your refill to be completed.          Additional Information About Your Visit        Care EveryWhere ID     This is your Care EveryWhere ID. This could be used by other organizations to access your [facility] medical records  [id number]        Your Vitals Were     Pulse Height Last Period Pulse Oximetry BMI (Body Mass Index)       88 5' 5\" (1.651 m) 01/03/2018 97% 34.45 kg/m2        Blood Pressure from Last 3 Encounters:   09/27/18 100/68   09/21/18 108/68   09/14/18 124/76    Weight from Last 3 Encounters:   09/27/18 207 lb (93.9 kg) "   09/21/18 205 lb (93 kg)   09/14/18 203 lb (92.1 kg)              Today, you had the following     No orders found for display       Primary Care Provider Office Phone # Fax #    Fransisca Sales [phone] [address] [facility] [address]        Equal Access to Services     [facility] PASTOR : Hadii aad ku hadasho Soomaali, waaxda luqadaha, qaybta kaalmada adeegyada, waxay jeanin hayaan adeeg kharashaun lavalerian . So [facility] [phone].    ATENCIÓN: Si habla español, tiene a perkins disposición servicios gratuitos de asistencia lingüística. Llame al [phone].    We comply with applicable federal civil rights laws and [place] laws. We do not discriminate on the basis of race, color, national origin, age, disability, sex, sexual orientation, or gender identity.            Thank you!     Thank you for choosing [facility]  for your care. Our goal is always to provide you with excellent care. Hearing back from our patients is one way we can continue to improve our services. Please take a few minutes to complete the written survey that you may receive in the mail after your visit with us. Thank you!             Your Updated Medication List - Protect others around you: Learn how to safely use, store and throw away your medicines at www.disposemymeds.org.          This list is accurate as of 9/27/18  1:27 PM.  Always use your most recent med list.                   Brand Name Dispense Instructions for use Diagnosis    folic acid 1 MG tablet    FOLVITE    100 tablet    Take 1 tablet (1 mg) by mouth daily    Routine general medical examination at a health care facility

## 2018-09-27 NOTE — PROGRESS NOTES
Doing well.  No concerns today.  Discussed signs of labor and when to call or come in.  She will report to OB if contractions every 5-10 minutes or if rupture of membranes.  RTC in 1 week  Denies regular contractions, vaginal bleeding, MARIBELL Sanchez MD  9/27/2018

## 2018-10-02 ENCOUNTER — HOSPITAL ENCOUNTER (INPATIENT)
Facility: HOSPITAL | Age: 28
LOS: 3 days | Discharge: HOME | End: 2018-10-05
Attending: OBSTETRICS & GYNECOLOGY | Admitting: OBSTETRICS & GYNECOLOGY
Payer: COMMERCIAL

## 2018-10-02 PROBLEM — Z37.9 NORMAL LABOR: Status: ACTIVE | Noted: 2018-10-02

## 2018-10-02 PROBLEM — Z36.89 ENCOUNTER FOR TRIAGE IN PREGNANT PATIENT: Status: ACTIVE | Noted: 2018-10-02

## 2018-10-02 LAB
ABO + RH BLD: NORMAL
ABO + RH BLD: NORMAL
BASOPHILS # BLD AUTO: 0 10E9/L (ref 0–0.2)
BASOPHILS NFR BLD AUTO: 0.1 %
BLD GP AB SCN SERPL QL: NORMAL
BLOOD BANK CMNT PATIENT-IMP: NORMAL
DIFFERENTIAL METHOD BLD: ABNORMAL
EOSINOPHIL # BLD AUTO: 0.1 10E9/L (ref 0–0.7)
EOSINOPHIL NFR BLD AUTO: 1.3 %
ERYTHROCYTE [DISTWIDTH] IN BLOOD BY AUTOMATED COUNT: 14.1 % (ref 10–15)
HCT VFR BLD AUTO: 33 % (ref 35–47)
HGB BLD-MCNC: 11.2 G/DL (ref 11.7–15.7)
IMM GRANULOCYTES # BLD: 0.1 10E9/L (ref 0–0.4)
IMM GRANULOCYTES NFR BLD: 0.5 %
LYMPHOCYTES # BLD AUTO: 2.1 10E9/L (ref 0.8–5.3)
LYMPHOCYTES NFR BLD AUTO: 21.1 %
MCH RBC QN AUTO: 29.6 PG (ref 26.5–33)
MCHC RBC AUTO-ENTMCNC: 33.9 G/DL (ref 31.5–36.5)
MCV RBC AUTO: 87 FL (ref 78–100)
MONOCYTES # BLD AUTO: 0.6 10E9/L (ref 0–1.3)
MONOCYTES NFR BLD AUTO: 5.9 %
NEUTROPHILS # BLD AUTO: 6.9 10E9/L (ref 1.6–8.3)
NEUTROPHILS NFR BLD AUTO: 71.1 %
NRBC # BLD AUTO: 0 10*3/UL
NRBC BLD AUTO-RTO: 0 /100
PLATELET # BLD AUTO: 232 10E9/L (ref 150–450)
RBC # BLD AUTO: 3.78 10E12/L (ref 3.8–5.2)
SPECIMEN EXP DATE BLD: NORMAL
WBC # BLD AUTO: 9.8 10E9/L (ref 4–11)

## 2018-10-02 PROCEDURE — 86901 BLOOD TYPING SEROLOGIC RH(D): CPT | Performed by: OBSTETRICS & GYNECOLOGY

## 2018-10-02 PROCEDURE — 25000128 H RX IP 250 OP 636: Performed by: OBSTETRICS & GYNECOLOGY

## 2018-10-02 PROCEDURE — G0463 HOSPITAL OUTPT CLINIC VISIT: HCPCS

## 2018-10-02 PROCEDURE — 36415 COLL VENOUS BLD VENIPUNCTURE: CPT | Performed by: OBSTETRICS & GYNECOLOGY

## 2018-10-02 PROCEDURE — 86850 RBC ANTIBODY SCREEN: CPT | Performed by: OBSTETRICS & GYNECOLOGY

## 2018-10-02 PROCEDURE — 86780 TREPONEMA PALLIDUM: CPT | Performed by: OBSTETRICS & GYNECOLOGY

## 2018-10-02 PROCEDURE — 12000027 ZZH R&B OB

## 2018-10-02 PROCEDURE — 86900 BLOOD TYPING SEROLOGIC ABO: CPT | Performed by: OBSTETRICS & GYNECOLOGY

## 2018-10-02 PROCEDURE — 85025 COMPLETE CBC W/AUTO DIFF WBC: CPT | Performed by: OBSTETRICS & GYNECOLOGY

## 2018-10-02 RX ORDER — LIDOCAINE 40 MG/G
CREAM TOPICAL
Status: DISCONTINUED | OUTPATIENT
Start: 2018-10-02 | End: 2018-10-03

## 2018-10-02 RX ORDER — METHYLERGONOVINE MALEATE 0.2 MG/ML
200 INJECTION INTRAVENOUS
Status: ACTIVE | OUTPATIENT
Start: 2018-10-02 | End: 2018-10-02

## 2018-10-02 RX ORDER — CEFAZOLIN SODIUM 2 G/100ML
2 INJECTION, SOLUTION INTRAVENOUS ONCE
Status: COMPLETED | OUTPATIENT
Start: 2018-10-02 | End: 2018-10-02

## 2018-10-02 RX ORDER — IBUPROFEN 800 MG/1
800 TABLET, FILM COATED ORAL
Status: ACTIVE | OUTPATIENT
Start: 2018-10-02 | End: 2018-10-02

## 2018-10-02 RX ORDER — OXYTOCIN/0.9 % SODIUM CHLORIDE 30/500 ML
100-340 PLASTIC BAG, INJECTION (ML) INTRAVENOUS CONTINUOUS PRN
Status: ACTIVE | OUTPATIENT
Start: 2018-10-02 | End: 2018-10-02

## 2018-10-02 RX ORDER — ACETAMINOPHEN 325 MG/1
650 TABLET ORAL EVERY 4 HOURS PRN
Status: DISCONTINUED | OUTPATIENT
Start: 2018-10-02 | End: 2018-10-03

## 2018-10-02 RX ORDER — ONDANSETRON 2 MG/ML
4 INJECTION INTRAMUSCULAR; INTRAVENOUS EVERY 6 HOURS PRN
Status: DISCONTINUED | OUTPATIENT
Start: 2018-10-02 | End: 2018-10-03

## 2018-10-02 RX ORDER — NALOXONE HYDROCHLORIDE 0.4 MG/ML
.1-.4 INJECTION, SOLUTION INTRAMUSCULAR; INTRAVENOUS; SUBCUTANEOUS
Status: DISCONTINUED | OUTPATIENT
Start: 2018-10-02 | End: 2018-10-03

## 2018-10-02 RX ORDER — OXYTOCIN 10 [USP'U]/ML
10 INJECTION, SOLUTION INTRAMUSCULAR; INTRAVENOUS
Status: ACTIVE | OUTPATIENT
Start: 2018-10-02 | End: 2018-10-02

## 2018-10-02 RX ORDER — CARBOPROST TROMETHAMINE 250 UG/ML
250 INJECTION, SOLUTION INTRAMUSCULAR
Status: ACTIVE | OUTPATIENT
Start: 2018-10-02 | End: 2018-10-02

## 2018-10-02 RX ORDER — CEFAZOLIN SODIUM 1 G/50ML
1 INJECTION, SOLUTION INTRAVENOUS EVERY 6 HOURS
Status: DISCONTINUED | OUTPATIENT
Start: 2018-10-03 | End: 2018-10-03

## 2018-10-02 RX ORDER — OXYCODONE AND ACETAMINOPHEN 5; 325 MG/1; MG/1
1 TABLET ORAL
Status: ACTIVE | OUTPATIENT
Start: 2018-10-02 | End: 2018-10-02

## 2018-10-02 RX ORDER — FENTANYL CITRATE 50 UG/ML
50-100 INJECTION, SOLUTION INTRAMUSCULAR; INTRAVENOUS
Status: DISCONTINUED | OUTPATIENT
Start: 2018-10-02 | End: 2018-10-03

## 2018-10-02 RX ADMIN — CEFAZOLIN SODIUM 2 G: 2 INJECTION, SOLUTION INTRAVENOUS at 22:11

## 2018-10-02 ASSESSMENT — ACTIVITIES OF DAILY LIVING (ADL)
FALL_HISTORY_WITHIN_LAST_SIX_MONTHS: NO
BATHING: 0-->INDEPENDENT
RETIRED_EATING: 0-->INDEPENDENT
TOILETING: 0-->INDEPENDENT
RETIRED_COMMUNICATION: 0-->UNDERSTANDS/COMMUNICATES WITHOUT DIFFICULTY
SWALLOWING: 0-->SWALLOWS FOODS/LIQUIDS WITHOUT DIFFICULTY
TRANSFERRING: 0-->INDEPENDENT
DRESS: 0-->INDEPENDENT
AMBULATION: 0-->INDEPENDENT
COGNITION: 0 - NO COGNITION ISSUES REPORTED

## 2018-10-02 NOTE — IP AVS SNAPSHOT
MRN:[id number]                      After Visit Summary   10/2/2018    Fawn Davis    MRN: [id number]           Thank you!     Thank you for choosing [facility] for your care. Our goal is always to provide you with excellent care. Hearing back from our patients is one way we can continue to improve our services. Please take a few minutes to complete the written survey that you may receive in the mail after you visit with us. Thank you!        Patient Information     Date Of Birth          1990        Designated Caregiver       Most Recent Value    Caregiver    Will someone help with your care after discharge? yes    Name of designated caregiver chely    Phone number of caregiver [phone]    Caregiver address same      About your hospital stay     You were admitted on:  October 2, 2018 You last received care in the:  [facility]    You were discharged on:  October 5, 2018        Reason for your hospital stay       Maternity care                  Who to Call     For medical emergencies, please call 911.  For non-urgent questions about your medical care, please call your primary care provider or clinic, [phone]          Attending Provider     Provider Specialty    Frow, David Franke, MD OB/Gyn    Romero Sanchez MD OB/Gyn       Primary Care Provider Office Phone # Fax #    Fransisca Sales [phone]      After Care Instructions     Activity       Review discharge instructions            Diet       Resume previous diet            Discharge Instructions - Postpartum visit       Schedule postpartum visit with your provider and return to clinic in 2 weeks.                  Follow-up Appointments     Follow Up and recommended labs and tests       2 weeks with Pam Colón                  Your next 10 appointments already scheduled     Oct 12, 2018  2:45 PM CDT   (Arrive by 2:30 PM)   Office Visit with Pam Colón NP   [facility]  ([facility] [place] )    [address]  [place] [address]   [phone]           Bring a current list of meds and any records pertaining to this visit.  For Physicals, please bring immunization records and any forms needing to be filled out.  Please arrive 15 minutes early to register.            Nov 15, 2018  2:45 PM CST   (Arrive by 2:30 PM)   Post Partum with Romero Sanchez MD   [facility] [place] ([facility] [place] )    [address]  [place] [address]   [phone]              Further instructions from your care team       Postpartum Vaginal Delivery Instructions    Activity    Ask family and friends for help when you need it.  Do not place anything in your vagina until approved by your Physician .  You are not restricted on other activities, but take it easy for a few weeks to allow your body to recover from delivery.  You are able to do any activities you feel up to that point.  No driving until you have stopped taking narcotic pain medications.    Call your health care provider if you have any of these symptoms:    Increased pain, swelling, redness, or fluid around your stiches from an episiotomy or perineal tear.  A fever above 100.4 F (38 C) with or without chills when placing a thermometer under your tongue.  You soak a sanitary pad with blood within 1 hour, or you see blood clots larger than a golf ball.  Bleeding that lasts more than 6 weeks.  Vaginal discharge that smells bad.  Severe pain, cramping or tenderness in your lower belly area.  A need to urinate more frequently (use the toilet more often), more urgently (use the toilet very quickly), or it burns when you urinate.  Nausea and vomiting.  Redness, swelling or pain around a vein in your leg.  Problems breastfeeding or a red or painful area on your breast.  Chest pain and cough or are gasping for air.  Problems coping with sadness, anxiety, or depression.  If you have any concerns about  "hurting yourself or the baby, call your provider immediately. The Safe Place for Newborns law allows you to bring your baby to the hospital up to 7 days, no questions asked.  You have questions or concerns after you return home.    Keep your hands clean:  Always wash your hands before touching your perineal area and stitches.  This helps reduce your risk of infection.  If your hands aren't dirty, you may use an alcohol hand-rub to clean your hands. Keep your nails clean and short.      Pending Results     No orders found from 9/30/2018 to 10/3/2018.            Statement of Approval     Ordered          10/05/18 0820  I have reviewed and agree with all the recommendations and orders detailed in this document.  EFFECTIVE NOW     Approved and electronically signed by:  Frow, David Franke, MD             Admission Information     Date & Time Provider Department Dept. Phone    10/2/2018 Romero Sanchez MD [facility] [phone]      Your Vitals Were     Blood Pressure Pulse Temperature Respirations Height Weight    134/87 69 98.8  F (37.1  C) (Oral) 18 1.651 m (5' 5\") 93.9 kg (207 lb)    Last Period Pulse Oximetry BMI (Body Mass Index)             01/03/2018 100% 34.45 kg/m2         Care EveryWhere ID     This is your Care EveryWhere ID. This could be used by other organizations to access your [facility] medical records  [id number]        Equal Access to Services     NIALL BAUMANN : Hadii evelyn ku hadasho Soomaali, waaxda luqadaha, qaybta kaalmada adeegyada, rosa patel. So [facility] [place] [phone].    ATENCIÓN: Si habla español, tiene a perkins disposición servicios gratuitos de asistencia lingüística. Llame al [phone].    We comply with applicable federal civil rights laws and [place] laws. We do not discriminate on the basis of race, color, national origin, age, disability, sex, sexual orientation, or gender identity.               Review of your medicines      START taking        Dose " "/ Directions    ibuprofen 800 MG tablet   Commonly known as:  ADVIL/MOTRIN   Used for:  Single liveborn infant delivered vaginally        Dose:  800 mg   Take 1 tablet (800 mg) by mouth every 8 hours as needed for pain Take with a full meal   Quantity:  40 tablet   Refills:  1         CONTINUE these medicines which have NOT CHANGED        Dose / Directions    folic acid 1 MG tablet   Commonly known as:  FOLVITE   Used for:  Routine general medical examination at a health care facility        Dose:  1 mg   Take 1 tablet (1 mg) by mouth daily   Quantity:  100 tablet   Refills:  3            Where to get your medicines      These medications were sent to [facility] - [address]  [address] [place] [address]     Phone:  [phone]     ibuprofen 800 MG tablet                Protect others around you: Learn how to safely use, store and throw away your medicines at www.disposemymeds.org.             Medication List: This is a list of all your medications and when to take them. Check marks below indicate your daily home schedule. Keep this list as a reference.      Medications           Morning Afternoon Evening Bedtime As Needed    folic acid 1 MG tablet   Commonly known as:  FOLVITE   Take 1 tablet (1 mg) by mouth daily                                ibuprofen 800 MG tablet   Commonly known as:  ADVIL/MOTRIN   Take 1 tablet (800 mg) by mouth every 8 hours as needed for pain Take with a full meal   Last time this was given:  800 mg on 10/5/2018  3:38 AM                                          More Information        Holding Your Baby While Breastfeeding      \"Laid-back\" position     Comfort and position are the keys to successful breastfeeding. Learn how to position your baby correctly at the breast. Choose the hold that works best for both of you. You may need to change holds as your baby grows.     Always make sure your baby is tummy-to-tummy with you.    Laid-back  baby-led " "natural position  Lie back on a sofa, bed, or reclining chair so that your body is at a comfortable 45-degree angle, but not flat. This may be more comfortable than sitting up and leaning over a breastfeeding pillow.  Here are some tips:    Place your baby on his or her tummy on your chest. When your baby feels your body with the whole front of his or her body, it triggers his or her senses to find your nipple. Let your baby move over to the breast and latch on without your help. Your arms will make a \"nest\" around your baby.    When your baby attaches, make sure you see more areola above the upper lip than below the lower lip. This should help protect your nipples from soreness.  Other positions you can try       Cradle hold \"Football\" hold Side-lying hold   Cradle hold or  cross-cradle  hold  Here are some tips:    Sit upright. Make sure you have back support and that you are comfortable and relaxed. Raise your baby to breast height. Use a pillow under your baby s bottom. Put your baby on his or her side on the pillow so his or her tummy is touching your tummy. Use a chair with armrests for your arms.    Keep your knees level with your hips. Put a stool or pillow under your feet if needed.    Cradle your baby. Make sure your baby s body is well supported by your arm (cradle hold). Or use your hand to support the base of your baby's head and neck (cross-cradle hold).     Make sure your baby s body is facing and touching your body with your baby's head higher than his or her bottom. It is easier for your baby to swallow that way.   Football  hold  You can use the football hold to breastfeed two babies at once.  Here are some tips:    Place a pillow at your side. Lay the baby s bottom on the pillow so that your baby's bottom is lower than his or her head. Hold your baby's neck so that your fingers are below his or her ears.     Make sure your baby's body is on his or her side so the whole front of your baby's body is " touching yours.    Tuck your baby s legs between your arm and body, as if you were clutching a football or purse at your side.  Side-lying hold  Here are some tips:    Stretch out on your side. Using pillows to support your head, neck, and back. Place your baby on his or her side facing you so that the front part of your baby's body is against your body.    Support your baby s head, neck, and back with your arm.    Let your baby find the nipple and attach without help.    Switch breasts. Gather your baby close to your chest. Then roll onto your other side to feed the same way from the other breast.    It is always possible to fall asleep while nursing, so make sure you are in a safe place when you use this hold. Do not use a couch. Follow your healthcare provider's advice about a safe sleep environment for your baby.  Date Last Reviewed: 3/1/2017    9497-9930 [employer]. [address] [place] [address]. All rights reserved. This information is not intended as a substitute for professional medical care. Always follow your healthcare professional's instructions.                How to Breastfeed  Babies use their lips, gums, and tongue to take milk from the breast (suckle). Your baby is born with an instinct for suckling. But it takes time for you and your baby to learn how to breastfeed. There are steps you can take to support your baby s natural instincts.  Skin-to-skin  If possible, hold your baby bare against your skin (skin-to-skin) just after giving birth and for a few hours after. You can also continue to do this in the first few weeks after birth.   How often should I feed my baby?  Nurse your  8 to 12 times every 24 hours. Feed your baby whenever he or she shows signs of hunger. When your baby is hungry, he or she will appear more awake and might root. Rooting means turning his or her head toward you when you stroke your baby s cheek. Your baby might also make a sucking sound or  "suck on his or her hand. Crying is a late sign of hunger. If your baby is crying, it may be hard for him or her to calm down to breastfeed. Infants will often eat at irregular times. But feedings will usually become more regular over time. Sometimes your baby might eat several times in a row (cluster feeding) and then take a break.   If your baby seems sleepy or too fussy to nurse, undress him or her and place your baby bare against your skin. Don't keep your baby swaddled tightly. This may keep him or her too sleepy to feed.  Change which breast you offer first with each feeding. For example, if you started nursing on the right side with the last feeding, offer the left side first with this feeding. Always offer the other breast after your baby stops nursing on the first side.  Ask your baby's healthcare provider about waking the baby for feeding. You may need to wake your baby and offer to nurse if it has been 4 hours since your baby's last feeding.     Offering your breast  Hold your breast with your thumb on top and fingers underneath in a loose . Gently stroke your nipple on your baby s lower lip. When you see your baby open his or her mouth wide, quickly bring the baby to your breast.     Latching on  The way your baby connects with the breast is called the latch. When your baby attaches, you should see more of the darker skin around the nipple (areola) above the baby's upper lip than below the lower lip. The front of your baby's entire body should be touching you. Your baby's nose and chin should be against the breast.  Your baby's cheeks should be full and not sinking inward. You should be able to see your baby's lips. They should be slightly flared outward. As your baby suckles, his or her jaw should open wide. It should not be \"munching\" as if chewing. Listen for swallowing.  It should not hurt when your baby latches on and suckles. If it does, try releasing the latch and starting over.      Releasing " the latch  Let your baby nurse until satisfied. In most cases, when your baby is finished nursing, he or she will let go on his or her own. This tells you that your baby is done feeding on that breast. But you may need to release the latch sooner if you feel pain or for some other reason. To do this, slip your finger into the corner of your baby's mouth. You should feel the suction break. Only when the seal is broken, move your baby off your breast. Don't take the baby off your breast until you've felt a decrease in suction.    Burping your baby   babies don't need to burp as much as bottle-fed babies. Bottles flow faster, and babies tend to swallow more air. Try to burp your baby after each breast:    Hold the baby at your upper chest or slightly over your shoulder. Gently rub or pat the baby s back.    Or hold the baby sitting up on your lap. Support your baby's head and chest in front and in back. Slowly rock your baby back and forth.    Don t worry if your baby doesn't burp. He or she may not need to.   Date Last Reviewed: 3/1/2017    7649-1368 [employer]. [address] [place] [address], [place] [address]. All rights reserved. This information is not intended as a substitute for professional medical care. Always follow your healthcare professional's instructions.                Expressing Your Milk  Work, school, or even a late-night movie can require you to be away from your baby. This doesn't mean you have to give up breastfeeding. Feeding your baby from your breasts is ideal. If you must be away from your baby, you can express milk from your breast. Talk with your healthcare provider about the best ways to feed expressed breastmilk to your infant. But remember, don t give your baby bottles or pacifiers until he or she is at least 4 to 6 weeks old. This helps you both get a good start on breastfeeding. Your baby can get used to your natural nipple first.      Expressing by hand Expressing with  "double pump      Always wash your hands before expressing milk from your breast.   Stimulating letdown    Hold a washcloth under very warm water and wring it out.    Place one warm washcloth over each breast to warm them.     Gently massage your breasts to stimulate the milk flow.    Start under the arm and move around the entire breast.     Use the backs of your fingernails to gently scratch the skin of your breasts downward from the outside toward your nipples.     If you re away from your baby, looking at your baby s picture can help your milk let down.  Expressing by hand or pump  Your lactation consultant can help you choose the best method for your needs. Here are some tips:    Expressing by hand reduces pressure in swollen or leaky breasts. It may be a good way to start a pumping session. If you need to give expressed milk to your baby in the first few days after delivery, hand expression can often help get more colostrum than using a pump. Ask your nurse or midwife to teach you how to hand express.    Start expressing within 6 hours of separation from your baby in the hospital.    When  from your baby, it is best to express as often and as long as a baby would breastfeed. Newborns feed 8 to 12 times each 24 hours.    A pump gently pulls your nipple into the cup like a baby s suck and can be the fastest way to express after your milk comes in. Pumps come in manual, battery-operated, and electric styles. To protect your breasts and the milk you pump, follow the instructions that come with your pump.    For sick or premature babies who aren't feeding at the breast, \"hands-on pumping\" is a special way to help be sure you make enough milk. Hands-on pumping involves a combination of both hand expression and an electrical pump.     Hand expressing while using a pump can increase the amount of milk you can pump. It can also increase the fat content of the pumped milk.    You can usually buy or rent a pump " from a drugstore or medical equipment store. Check with your hospital to find out where you can buy or rent a pump.  Working and breastfeeding    Breastfeed your baby all of the time during your maternity leave. This helps set up your milk supply for the whole year.    When your baby is about 2 weeks old, start pumping after you feed the baby. You can freeze this expressed milk. It will help you build a supply for going back to work. Nursing plus pumping will help your breasts make more milk. Feeding your baby from your breasts is ideal. If you must be away from your baby, you can express milk from your breast. Talk with your healthcare provider about the best ways to feed expressed breastmilk to your infant.      Express milk during work breaks. This helps protect your milk supply. It also helps prevent engorged or leaking breasts.    Arrange to breastfeed at lunch if your childcare is nearby. If not, be sure to pump during your lunch break.    Breastfeed before you leave for work and soon after you return home. Your partner may be able to make dinner while you feed the baby.    Breastfeed at night and on weekends. This will keep up your milk supply. Talk to your healthcare provider about the best ways to feed expressed breastmilk to your infant.  Date Last Reviewed: 3/1/2017    5968-9568 [employer]. [address] [place] [address], [place] [address]. All rights reserved. This information is not intended as a substitute for professional medical care. Always follow your healthcare professional's instructions.                Understanding Postpartum Depression  You ve just had a baby. You expected to be excited and happy. But instead you find yourself crying for no reason. You may have trouble coping with your daily tasks. You feel sad, tired, and hopeless most of the time. You may even feel ashamed or guilty. But what you re going through is not your fault and you can feel better. Talk to your healthcare  "provider. He or she can help.    What is depression?  Depression is a mood disorder that affects the way you think and feel. The most common symptom is a feeling of deep sadness. You may also feel as if you just can t cope with life. Other symptoms include:    Gaining or losing a lot of weight    Sleeping too much or too little    Feeling tired all the time    Feeling restless    Crying a lot    Having too little or too much appetite.    Withdrawing from friends and family    Having headaches, aches and pains, or stomach problems that won't go away.    Fears of harming your baby    Lack of interest in your baby    Feeling worthless or guilty    No longer finding pleasure in things you used to    Having trouble thinking clearly or making decisions    Thinking about death or suicide   Depression after childbirth  You may be weepy and tired right after giving birth. These feelings are normal. They re sometimes called the  baby blues.  These blues go away after 1 to 2 weeks. However, postpartum (meaning  after birth ) depression lasts much longer and is more severe than the \"baby blues.\" It can make you feel sad and hopeless. You may also fear that your baby will be harmed and worry about being a bad mother.  What causes postpartum depression?  The exact cause of postpartum depression is unknown. Changes in brain chemistry or structure are believed to play a big role in depression. It may be due to changes in your hormones during and after childbirth. You may also be tired from caring for your baby and adjusting to being a mother. All these factors may make you feel depressed. In some cases, your genes may also play a role.  Depression can be treated  There are many ways to treat postpartum depression. Talking to your healthcare provider is the first step toward feeling better.  When to call your healthcare provider  Call your healthcare provider if you:     Cry for no clear reason    Have trouble sleeping, eating, and " making choices    Questions whether you can handle caring for a baby    Have intense feelings of sadness, anxiety, or despair that prevent you from being able to do your daily tasks  Resources    National [place] of Mental Zaspin279-455-5918guu.[facility].nih.gov    National [place] on Mental Hepkiay231-727-8340bhi.[facility]ennis.org    Mental Health Rdiumhn625-884-4126qsw.[facility].org    National Suicide Opiirdh983-704-9563 (800-SUICIDE)   Date Last Reviewed: 8/16/2015 2000-2017 [employer]. [address] [place] [address]. All rights reserved. This information is not intended as a substitute for professional medical care. Always follow your healthcare professional's instructions.

## 2018-10-02 NOTE — IP AVS SNAPSHOT
[facility]    [address] [address]    Phone:  [phone]    Fax:  [phone]                                       After Visit Summary   10/2/2018    Fawn Davis    MRN: [id number]           After Visit Summary Signature Page     I have received my discharge instructions, and my questions have been answered. I have discussed any challenges I see with this plan with the nurse or doctor.    ..........................................................................................................................................  Patient/Patient Representative Signature      ..........................................................................................................................................  Patient Representative Print Name and Relationship to Patient    ..................................................               ................................................  Date                                   Time    ..........................................................................................................................................  Reviewed by Signature/Title    ...................................................              ..............................................  Date                                               Time          22EPIC Rev 08/18

## 2018-10-03 PROBLEM — O48.0 POST TERM PREGNANCY: Status: ACTIVE | Noted: 2018-10-03

## 2018-10-03 LAB — BLOOD BANK CMNT PATIENT-IMP: NORMAL

## 2018-10-03 PROCEDURE — 12000027 ZZH R&B OB

## 2018-10-03 PROCEDURE — 25000125 ZZHC RX 250: Performed by: OBSTETRICS & GYNECOLOGY

## 2018-10-03 PROCEDURE — 25000132 ZZH RX MED GY IP 250 OP 250 PS 637: Performed by: OBSTETRICS & GYNECOLOGY

## 2018-10-03 PROCEDURE — 0KQM0ZZ REPAIR PERINEUM MUSCLE, OPEN APPROACH: ICD-10-PCS | Performed by: OBSTETRICS & GYNECOLOGY

## 2018-10-03 PROCEDURE — 59400 OBSTETRICAL CARE: CPT | Performed by: OBSTETRICS & GYNECOLOGY

## 2018-10-03 PROCEDURE — 72200001 ZZH LABOR CARE VAGINAL DELIVERY SINGLE

## 2018-10-03 RX ORDER — AMOXICILLIN 250 MG
2 CAPSULE ORAL 2 TIMES DAILY
Status: DISCONTINUED | OUTPATIENT
Start: 2018-10-03 | End: 2018-10-05 | Stop reason: HOSPADM

## 2018-10-03 RX ORDER — OXYTOCIN/0.9 % SODIUM CHLORIDE 30/500 ML
500 PLASTIC BAG, INJECTION (ML) INTRAVENOUS
Status: COMPLETED | OUTPATIENT
Start: 2018-10-03 | End: 2018-10-03

## 2018-10-03 RX ORDER — IBUPROFEN 800 MG/1
800 TABLET, FILM COATED ORAL EVERY 6 HOURS PRN
Status: DISCONTINUED | OUTPATIENT
Start: 2018-10-03 | End: 2018-10-05 | Stop reason: HOSPADM

## 2018-10-03 RX ORDER — CARBOPROST TROMETHAMINE 250 UG/ML
250 INJECTION, SOLUTION INTRAMUSCULAR
Status: DISCONTINUED | OUTPATIENT
Start: 2018-10-03 | End: 2018-10-05 | Stop reason: HOSPADM

## 2018-10-03 RX ORDER — ACETAMINOPHEN 325 MG/1
650 TABLET ORAL EVERY 4 HOURS PRN
Status: DISCONTINUED | OUTPATIENT
Start: 2018-10-03 | End: 2018-10-05 | Stop reason: HOSPADM

## 2018-10-03 RX ORDER — IBUPROFEN 800 MG/1
800 TABLET, FILM COATED ORAL
Status: COMPLETED | OUTPATIENT
Start: 2018-10-03 | End: 2018-10-03

## 2018-10-03 RX ORDER — BISACODYL 10 MG
10 SUPPOSITORY, RECTAL RECTAL DAILY PRN
Status: DISCONTINUED | OUTPATIENT
Start: 2018-10-05 | End: 2018-10-05 | Stop reason: HOSPADM

## 2018-10-03 RX ORDER — OXYTOCIN/0.9 % SODIUM CHLORIDE 30/500 ML
100 PLASTIC BAG, INJECTION (ML) INTRAVENOUS CONTINUOUS
Status: DISCONTINUED | OUTPATIENT
Start: 2018-10-03 | End: 2018-10-05 | Stop reason: HOSPADM

## 2018-10-03 RX ORDER — OXYTOCIN 10 [USP'U]/ML
10 INJECTION, SOLUTION INTRAMUSCULAR; INTRAVENOUS
Status: DISCONTINUED | OUTPATIENT
Start: 2018-10-03 | End: 2018-10-03

## 2018-10-03 RX ORDER — METHYLERGONOVINE MALEATE 0.2 MG/ML
200 INJECTION INTRAVENOUS
Status: DISCONTINUED | OUTPATIENT
Start: 2018-10-03 | End: 2018-10-05 | Stop reason: HOSPADM

## 2018-10-03 RX ORDER — HYDROCORTISONE 2.5 %
CREAM (GRAM) TOPICAL 3 TIMES DAILY PRN
Status: DISCONTINUED | OUTPATIENT
Start: 2018-10-03 | End: 2018-10-05 | Stop reason: HOSPADM

## 2018-10-03 RX ORDER — LANOLIN 100 %
OINTMENT (GRAM) TOPICAL
Status: DISCONTINUED | OUTPATIENT
Start: 2018-10-03 | End: 2018-10-05 | Stop reason: HOSPADM

## 2018-10-03 RX ORDER — AMOXICILLIN 250 MG
1 CAPSULE ORAL 2 TIMES DAILY
Status: DISCONTINUED | OUTPATIENT
Start: 2018-10-03 | End: 2018-10-05 | Stop reason: HOSPADM

## 2018-10-03 RX ADMIN — OXYTOCIN-SODIUM CHLORIDE 0.9% IV SOLN 30 UNIT/500ML 500 ML: 30-0.9/5 SOLUTION at 01:48

## 2018-10-03 RX ADMIN — SENNOSIDES AND DOCUSATE SODIUM 1 TABLET: 8.6; 5 TABLET ORAL at 20:55

## 2018-10-03 RX ADMIN — IBUPROFEN 800 MG: 800 TABLET ORAL at 09:36

## 2018-10-03 RX ADMIN — SENNOSIDES AND DOCUSATE SODIUM 1 TABLET: 8.6; 5 TABLET ORAL at 09:36

## 2018-10-03 RX ADMIN — IBUPROFEN 800 MG: 800 TABLET ORAL at 16:40

## 2018-10-03 RX ADMIN — LIDOCAINE HYDROCHLORIDE 20 ML: 10 INJECTION, SOLUTION INFILTRATION; PERINEURAL at 01:56

## 2018-10-03 RX ADMIN — IBUPROFEN 800 MG: 800 TABLET ORAL at 02:04

## 2018-10-03 NOTE — PROGRESS NOTES
Vaginal delivery at 0141 with second degree repair.  Has had baby to breast. Minimal vaginal flow no clots noted.requested oral pain meds after delivery. Had no meds before delivery. Continue to assess as needed. Callie mitchell rn

## 2018-10-03 NOTE — PROGRESS NOTES
[facility]    Post-Partum Progress Note    Assessment & Plan   Assessment:  Post-partum day # 0  Normal spontaneous vaginal delivery    Doing well.  No excessive bleeding  Pain well-controlled.  Tolerating physical therapy and rehabilitation well.    Plan:  Ambulation encouraged  Breast feeding strategies discussed  Hemoglobin in AM  Pain control measures as needed  Reportable signs and symptoms dicussed with the patient    Greg RADHA Gonzalez     Interval History   Doing well.  Pain is well-controlled.  No fevers.  No history of foul-smelling vaginal discharge.  Good appetite.  Denies chest pain, shortness of breath, nausea or vomiting.  Vaginal bleeding is similar to a heavy menstrual flow.  Ambulatory.  Breastfeeding well.    Medications     - MEDICATION INSTRUCTIONS -       - MEDICATION INSTRUCTIONS -       - MEDICATION INSTRUCTIONS -       oxytocin in 0.9% NaCl         influenza vaccine adult (product based on age)  0.5 mL Intramuscular Prior to discharge     senna-docusate  1 tablet Oral BID    Or     senna-docusate  2 tablet Oral BID       Physical Exam   Temp: 98.4  F (36.9  C) Temp src: Oral BP: 122/82 Pulse: 76   Resp: 18 SpO2: 97 %      Vitals:    10/02/18 2206   Weight: 93.9 kg (207 lb)     Vital Signs with Ranges  Temp:  [98.4  F (36.9  C)-98.9  F (37.2  C)] 98.4  F (36.9  C)  Pulse:  [68-86] 76  Resp:  [16-20] 18  BP: (101-136)/(56-85) 122/82  SpO2:  [97 %] 97 %  I/O last 3 completed shifts:  In: -   Out: 250 [Blood:250]    Uterine fundus is firm, non-tender and at the level of the umbilicus  Extremities Non-tender    Data   Recent Labs   Lab Test  10/02/18   2205   ABO  O   RH  Neg   AS  Neg     Recent Labs   Lab Test  10/02/18   2205  07/10/18   1104   HGB  11.2*  11.5*     Recent Labs   Lab Test  05/31/18   1525   RUQIGG  27

## 2018-10-03 NOTE — PLAN OF CARE
Face to face report given with opportunity to observe patient.    Report given to COTY Alegria   10/2/2018  10:52 PM

## 2018-10-03 NOTE — H&P
[facility]    History and Physical  Obstetrics and Gynecology     Date of Admission:  10/2/2018    Assessment & Plan   Fawn Davis is a 28 year old female who presents with labor and SROM  ASSESSMENT:   IUP @ 39w0d in active labor.  NST reactive.  Category  I    PLAN:   Admit - see IP orders    Greg Gonzalez    History of Present Illness   Fawn Davis is a 28 year old female  39w0d  Estimated Date of Delivery: Oct 10, 2018 is calculated from Patient's last menstrual period was 2018. [facility]  in [facility].    PRENATAL COURSE  Prenatal course was essentially uncomplicated      Recent Labs   Lab Test  10/02/18   2205   ABO  O   RH  Neg   AS  Neg     Rhogam pending delivery and cord type and RH   Recent Labs   Lab Test  18   1410  18   1525   HEPBANG   --   Nonreactive   HIAGAB   --   Nonreactive   GBS  Positive*   --    RUQIGG   --   27       Past Medical History    I have reviewed this patient's medical history and updated it with pertinent information if needed.   Past Medical History:   Diagnosis Date     Contraceptive use        Past Surgical History   I have reviewed this patient's surgical history and updated it with pertinent information if needed.  Past Surgical History:   Procedure Laterality Date     BILATERAL > PE TUBES       Fractured Arm       Left and Right Bunionectomy       TONSILLECTOMY       [place] Teeth Extraction         Prior to Admission Medications   Prior to Admission Medications   Prescriptions Last Dose Informant Patient Reported? Taking?   folic acid (FOLVITE) 1 MG tablet 10/2/2018 at Unknown time  Yes Yes   Sig: Take 1 tablet (1 mg) by mouth daily      Facility-Administered Medications: None     Allergies   Allergies   Allergen Reactions     Penicillins Hives       Social History   I have reviewed this patient's social history and updated it with pertinent information if needed. Fawn Davis  reports that she has never  smoked. She has never used smokeless tobacco. She reports that she drinks alcohol. She reports that she does not use illicit drugs.    Family History   I have reviewed this patient's family history and updated it with pertinent information if needed.   Family History   Problem Relation Age of Onset     HEART DISEASE Sister      murmer     Hyperlipidemia Mother        Immunization History   Immunization status is unknown    Physical Exam   Temp: 98.6  F (37  C) Temp src: Oral BP: 128/85 Pulse: 86   Resp: 16 SpO2: 97 %      Vital Signs with Ranges  Temp:  [98.6  F (37  C)] 98.6  F (37  C)  Pulse:  [86] 86  Resp:  [16-18] 16  BP: (128)/(85) 128/85  SpO2:  [97 %] 97 %    Abdomen: gravid, single vertex fetus, non-tender, EFW 8 lbs 0  Cervical Exam: 9/ 90/ Anterior/ soft/ 0     Fetal Heart Tones: 140's baseline, moderate variablility, + accels, early decelerations and Category I  TOCO:   frequency q 2 minutes    Constitutional: healthy, alert and active   Respiratory: No increased work of breathing, good air exchange, clear to auscultation bilaterally, no crackles or wheezing  Cardiovascular: Normal apical impulse, regular rate and rhythm, normal S1 and S2, no S3 or S4, and no murmur noted  Skin/Extremites: no bruising or bleeding, normal skin color, texture, turgor, no redness, warmth, or swelling, no rashes and no lesions  Neurologic: Awake, alert, oriented to name, place and time.  Cranial nerves II-XII are grossly intact.  Motor is 5 out of 5 bilaterally.  Neuropsychiatric: General: normal, calm and normal eye contact

## 2018-10-03 NOTE — PLAN OF CARE
Labor Admission  Fawn Davis  MRN: [id number]  Gestational Age: 38w6d      Fawn Davis is admitted for active labor.  States jenniffer since none.  Rates pain at 4/10.  Reports LOF.  Reports moderate clear fluid seen.    Dr. Gonzalez notified of arrival and condition and Intrapartum orders initiated.      FHT: 140  NST: Reactive .  Uterine Assessment: frequency q 3-5 minutes, Contractions: Duration  seconds of mild quality.     Patient is alert and oriented X 3,Patient oriented to room, unit, hourly rounding, and plan of care.  Call light within reach. Explained admission packet with patient bill of rights brochure. Will continue to monitor and document as needed.     Inpatient nursing criteria listed below was met:    Health care directives status obtained and documented: No  Patient identifies a surrogate decision maker: Yes   If yes, who:chely  Contact Information:as charted  Core Measure diagnosis present:: No  Vaccine assessment done and vaccines ordered if appropriate. Yes  Clergy visit ordered if patient requests: declines  Skin issues/needs documented:none   Isolation needs addressed, if appropriate: Yes  Fall Prevention (Med and High risk): Care plan updated, Education given and documented and signage used: Yes  Care Plan initiated: Yes  Education Documented (Reminder to educate patient if MRSA is present on admission): Yes  Education Assessment documented:Yes  Patient has discharge needs (If yes, please explain): No

## 2018-10-03 NOTE — PLAN OF CARE
Client called et stated SROM at 2100 and would be right in. Not jenniffer at the time. Arrived with grossly ruptured membranes, clear fluid. Cervical check, VSS, on monitor. FHT reassuring, NEST reactive. Admitted to floor. GBS + ABX started.

## 2018-10-03 NOTE — PROGRESS NOTES
FOB:  [place]  Marital status:     Lives at: home in [place]  Lives with: Merrick and Fei  Support System: family and friends    Primary PCP:  Fransisca Sales  OB:  Carlos  Baby PCP: MAREK Sales  Insurance: [employer] [place] [employer]    Agency Contacts: none  Mental Health: no concerns  Violence: not asked  Substance Abuse: no use, no concerns    Adequate resources for needs (housing, utilities, food/med): yes    Transportation:  They both drive  Car Seat: Yes  Diapers:  Yes    Education concerns on self/baby care:   Feels confident she can care for her self and ; is willing to talk with family and friends if needed

## 2018-10-03 NOTE — PLAN OF CARE
Problem: Breastfeeding (Adult,Obstetrics,Pediatric)  Goal: Signs and Symptoms of Listed Potential Problems Will be Absent, Minimized or Managed (Breastfeeding)  Signs and symptoms of listed potential problems will be absent, minimized or managed by discharge/transition of care (reference Breastfeeding (Adult,Obstetrics,Pediatric) CPG).  Outcome: Improving  Baby has been skin to skin with mom off and on since birth. Mom able to get baby to latch on  After being skin to skin  approx 1 hour.  Mom handles babywith ease. Callie mitchell rn

## 2018-10-03 NOTE — PLAN OF CARE
Problem: Postpartum (Vaginal Delivery) (Adult,Obstetrics,Pediatric)  Goal: Signs and Symptoms of Listed Potential Problems Will be Absent, Minimized or Managed (Postpartum)  Signs and symptoms of listed potential problems will be absent, minimized or managed by discharge/transition of care (reference Postpartum (Vaginal Delivery) (Adult,Obstetrics,Pediatric) CPG).  Outcome: Improving  Has been up to void at 0530. States has some pain of cramping. 1 small blood clot noted was able to void in a good amount. Is sitting up in bed breast feeding baby at this time. States oral ibuprofen helped some what.still feels pain of cramping with feeding. Continue to assist as needed. Callie mitchell rn

## 2018-10-03 NOTE — PROGRESS NOTES
Patient seen. Doing well.  No anesthesia yet.    Strip is CAT I    Cervix: 9/90%/0/soft/VTX    PLAN: allow to labor

## 2018-10-03 NOTE — L&D DELIVERY NOTE
Delivery Summary    Fawn Davis MRN# [id number]   Age: 28 year old YOB: 1990     ASSESSMENT & PLAN: NVD with perineal laceration repair        Labor Event Times    Labor onset date:  10/2/18 Onset time:   8:00 PM   Dilation complete date:  10/3/18 Complete time:   1:30 AM   Start pushing date/time:  10/3/2018 0130            Labor Length    1st Stage (hrs):  5 (min):  30   2nd Stage (hrs):  0 (min):  11   3rd Stage (hrs):  0 (min):  7      Labor Events     labor?:  No   Labor Type:  Spontaneous   Predominate monitoring during 1st stage:  continuous electronic fetal monitoring      Antibiotics received during labor?:  Yes      Rupture identifier:  Rupture 1   Rupture date/time: 10/2/18 2100   Rupture type:  Spontaneous rupture of membranes occuring during spontaneous labor or augmentation   Fluid color:  Clear   Fluid odor:  Normal      1:1 continuous labor support provided by?:  RN Labor partogram used?:  no         Delivery/Placenta Date and Time    Delivery Date:  10/3/18 Delivery Time:   1:41 AM   Placenta Date/Time:  10/3/2018  1:48 AM   Oxytocin given at the time of delivery:  after delivery of placenta      Vaginal Counts    Initial count performed by 2 team members:   Two Team Members   herman sarah rn           [employer]   Initial counts 2 0 15 10   Added to count  2     Final counts 2 2 15 10      Placed during labor Accounted for at the end of labor   NA NA   NA NA   NA NA      Final count performed by 2 team members:   Two Team Members   jeremie mitchell rn               Apgars    Living status:  Living    1 Minute 5 Minute 10 Minute 15 Minute 20 Minute   Skin color: 0  1       Heart rate: 2  2       Reflex irritability: 2  2       Muscle tone: 2  2       Respiratory effort: 2  2       Total: 8  9          Apgars assigned by:  JEREMIE CHRISTENSEN RN      Cord    Vessels:  3 Vessels Complications:  None   Cord Blood Disposition:  Lab Gases Sent?:  No           Resuscitation    Methods:  None       Care at Delivery:  Viable baby boy delivered via       Skin to Skin and Feeding Plan    Skin to skin initiation date/time: 10/3/18 0141   Skin to skin with:  Mother   Skin to skin end date/time:        Labor Events and Shoulder Dystocia    Fetal Tracing Prior to Delivery:  Category 2   Shoulder dystocia present?:  Neg            Delivery (Maternal) (Provider to Complete) (290697)    Episiotomy:  None   Perineal lacerations:  2nd    Vaginal laceration?:  No Repaired?:  Yes   Cervical laceration?:  No    Est. blood loss (mL):  250         Mother's Information  Mother: Fawn Davis #[id number]    Start of Mother's Information     IO Blood Loss  10/02/18 2000 - 10/03/18 0220    Mom's I/O Activity            End of Mother's Information  Mother: Fawn Davis #[id number]            Delivery - Provider to Complete (216183)    Delivering clinician:  FROW, DAVID FRANKE   Attempted Delivery Types (Choose all that apply):  Spontaneous Vaginal Delivery   Delivery Type (Choose the 1 that will go to the Birth History):  Vaginal, Spontaneous Delivery                           Placenta    Delayed Cord Clamping:  Done   Date/Time:  10/3/2018  1:48 AM   Removal:  Spontaneous   Disposition:  Hospital disposal      Anesthesia    Method:  Local         Presentation and Position    Presentation:  Vertex   Position:  Middle Occiput Anterior                    Greg Garza MD

## 2018-10-04 LAB
ABO + RH BLD: NORMAL
ABO + RH BLD: NORMAL
BLOOD BANK CMNT PATIENT-IMP: NORMAL
DATE RH IMM GL GVN: NORMAL
FETAL CELL SCN BLD QL ROSETTE: NORMAL
HGB BLD-MCNC: 9.5 G/DL (ref 11.7–15.7)
RH IG VIALS RECOM PATIENT: NORMAL
T PALLIDUM AB SER QL: NONREACTIVE

## 2018-10-04 PROCEDURE — 12000027 ZZH R&B OB

## 2018-10-04 PROCEDURE — 85018 HEMOGLOBIN: CPT | Performed by: OBSTETRICS & GYNECOLOGY

## 2018-10-04 PROCEDURE — 25000132 ZZH RX MED GY IP 250 OP 250 PS 637: Performed by: OBSTETRICS & GYNECOLOGY

## 2018-10-04 PROCEDURE — 25000128 H RX IP 250 OP 636: Performed by: OBSTETRICS & GYNECOLOGY

## 2018-10-04 PROCEDURE — 86901 BLOOD TYPING SEROLOGIC RH(D): CPT | Performed by: OBSTETRICS & GYNECOLOGY

## 2018-10-04 PROCEDURE — 86900 BLOOD TYPING SEROLOGIC ABO: CPT | Performed by: OBSTETRICS & GYNECOLOGY

## 2018-10-04 PROCEDURE — 36415 COLL VENOUS BLD VENIPUNCTURE: CPT | Performed by: OBSTETRICS & GYNECOLOGY

## 2018-10-04 PROCEDURE — 85461 HEMOGLOBIN FETAL: CPT | Performed by: OBSTETRICS & GYNECOLOGY

## 2018-10-04 RX ADMIN — SENNOSIDES AND DOCUSATE SODIUM 1 TABLET: 8.6; 5 TABLET ORAL at 15:47

## 2018-10-04 RX ADMIN — HUMAN RHO(D) IMMUNE GLOBULIN 300 MCG: 300 INJECTION, SOLUTION INTRAMUSCULAR at 15:48

## 2018-10-04 RX ADMIN — SENNOSIDES AND DOCUSATE SODIUM 1 TABLET: 8.6; 5 TABLET ORAL at 21:48

## 2018-10-04 RX ADMIN — IBUPROFEN 800 MG: 800 TABLET ORAL at 15:47

## 2018-10-04 NOTE — PLAN OF CARE
Problem: Patient Care Overview  Goal: Plan of Care/Patient Progress Review  Outcome: Improving  Assessments completed as charted. B/P: 121/68, T: 98.3, P: 61, R: 16. Rates pain: 0/10 . Voiding without difficulty. Fundus: Midline, U-1. Lochia: Light. Activity: normal activity. Infant feeding: Breast feeding going well.     LATCH Score:   Latch: 1 - Repeated Attempts  Audible Swallowin - Spontaneous & frequent  Type of Nipple: (Breast/Nipple) 2 - Everted  Comfort: 2 - Soft, Nontender  Hold: 2 - No Assist   Total LATCH Score: 9    Postpartum breastfeeding assessment completed and education provided, see Patient Education Activity.  Items included in the education are:     proper positioning and latch    effectiveness of feeding    manual expression    handling and storing breastmilk    maintenance of breastfeeding for the first 6 months    sign/symptoms of infant feeding issues requiring referral to qualified health care provider  Postpartum care education provided, see Patient Education activity. Patient denies needs. Will monitor.  Paola Easley RN

## 2018-10-04 NOTE — PLAN OF CARE
Assessments completed as charted. B/P: 140/57, T: 97.8, P: 73, R: 16. Rates pain: 0/10 . Voiding without difficulty. Fundus: Midline . Lochia: Moderate. Activity: unrestricted with out pain  and normal activity. Infant feeding: Breast feeding going well.     LATCH Score:   Latch: 2 - Good Latch  Audible Swallowin - Few  Type of Nipple: (Breast/Nipple) 2 - Everted  Comfort: 2 - Soft, Nontender  Hold: 1 - Min. Assist   Total LATCH Score:     Postpartum breastfeeding assessment completed and education provided, see Patient Education Activity.  Items included in the education are:     proper positioning and latch    effectiveness of feeding    manual expression    handling and storing breastmilk    maintenance of breastfeeding for the first 6 months    sign/symptoms of infant feeding issues requiring referral to qualified health care provider  Postpartum care education provided, see Patient Education activity. Patient denies needs. Will monitor.  Santa Loyola

## 2018-10-04 NOTE — PLAN OF CARE
Face to face report given with opportunity to observe patient.    Report given to Santa Easley RN  10/4/2018  7:21 AM

## 2018-10-04 NOTE — PLAN OF CARE
Assessments completed as charted. B/P: 121/68, T: 98, P: 82, R: 16. Rates pain: 0/10 . Voiding without difficulty. Fundus: Midline . Lochia: Light. Activity: unrestricted with out pain  and normal activity. Infant feeding: Both breast and formula.     LATCH Score:   Latch: 2 - Good Latch  Audible Swallowin - Few  Type of Nipple: (Breast/Nipple) 2 - Everted  Comfort: 2 - Soft, Nontender  Hold: 2 - No Assist   Total LATCH Score:     Postpartum breastfeeding assessment completed and education provided, see Patient Education Activity.  Items included in the education are:     proper positioning and latch    effectiveness of feeding    manual expression    handling and storing breastmilk    maintenance of breastfeeding for the first 6 months    sign/symptoms of infant feeding issues requiring referral to qualified health care provider  Postpartum care education provided, see Patient Education activity. Patient denies needs. Will monitor.  Santa Loyola

## 2018-10-04 NOTE — PROGRESS NOTES
[facility]    Post-Partum Progress Note    Assessment & Plan   Assessment:  Post-partum day #1  Normal spontaneous vaginal delivery    Doing well.  No excessive bleeding  Pain well-controlled.  Tolerating physical therapy and rehabilitation well.  Breast feeding well  Baby on bili lights    Plan:  Ambulation encouraged  Breast feeding strategies discussed  Pain control measures as needed  Reportable signs and symptoms dicussed with the patient  RhoGam given  Anticipate discharge tomorrow...just now a little over 24 hours and baby on Bili lights    Greg Gonzalez     Interval History   Doing well.  Pain is well-controlled.  No fevers.  No history of foul-smelling vaginal discharge.  Good appetite.  Denies chest pain, shortness of breath, nausea or vomiting.  Vaginal bleeding is similar to a heavy menstrual flow.  Ambulatory.  Breastfeeding well.  Just now a little over 24 hours and baby on bili lights.  Will discharge  tomorrow    Medications     - MEDICATION INSTRUCTIONS -       - MEDICATION INSTRUCTIONS -       - MEDICATION INSTRUCTIONS -       oxytocin in 0.9% NaCl Stopped (10/03/18 0500)       influenza vaccine adult (product based on age)  0.5 mL Intramuscular Prior to discharge     senna-docusate  1 tablet Oral BID    Or     senna-docusate  2 tablet Oral BID       Physical Exam   Temp: 98.3  F (36.8  C) Temp src: Oral BP: 121/68 Pulse: 61   Resp: 16 SpO2: 98 %      Vitals:    10/02/18 2206   Weight: 93.9 kg (207 lb)     Vital Signs with Ranges  Temp:  [97.8  F (36.6  C)-98.3  F (36.8  C)] 98.3  F (36.8  C)  Pulse:  [61-73] 61  Resp:  [16] 16  BP: (121-140)/(57-68) 121/68  SpO2:  [97 %-98 %] 98 %       Uterine fundus is firm, non-tender and at the level of the umbilicus  Extremities Non-tender  Perineal sutures intact and wound healing well  Heart is regular rate and rhythm and lungs clear to auscultation    Data   Recent Labs   Lab Test  10/02/18   2205   ABO  O   RH  Neg   AS  Neg     Recent Labs    Lab Test  10/04/18   0602  10/02/18   2205   HGB  9.5*  11.2*     Recent Labs   Lab Test  05/31/18   1525   RUQIGG  27

## 2018-10-04 NOTE — PLAN OF CARE
Face to face report given with opportunity to observe patient.    Report given to Salma Loyola   10/3/2018  7:14 PM

## 2018-10-05 VITALS
HEIGHT: 65 IN | DIASTOLIC BLOOD PRESSURE: 87 MMHG | TEMPERATURE: 98.8 F | WEIGHT: 207 LBS | HEART RATE: 69 BPM | BODY MASS INDEX: 34.49 KG/M2 | OXYGEN SATURATION: 100 % | SYSTOLIC BLOOD PRESSURE: 134 MMHG | RESPIRATION RATE: 18 BRPM

## 2018-10-05 PROCEDURE — 25000128 H RX IP 250 OP 636: Performed by: OBSTETRICS & GYNECOLOGY

## 2018-10-05 PROCEDURE — 90686 IIV4 VACC NO PRSV 0.5 ML IM: CPT | Performed by: OBSTETRICS & GYNECOLOGY

## 2018-10-05 PROCEDURE — 25000132 ZZH RX MED GY IP 250 OP 250 PS 637: Performed by: OBSTETRICS & GYNECOLOGY

## 2018-10-05 RX ORDER — IBUPROFEN 800 MG/1
800 TABLET, FILM COATED ORAL EVERY 8 HOURS PRN
Qty: 40 TABLET | Refills: 1 | Status: SHIPPED | OUTPATIENT
Start: 2018-10-05 | End: 2018-11-15

## 2018-10-05 RX ADMIN — IBUPROFEN 800 MG: 800 TABLET ORAL at 03:38

## 2018-10-05 RX ADMIN — INFLUENZA A VIRUS A/MICHIGAN/45/2015 X-275 (H1N1) ANTIGEN (FORMALDEHYDE INACTIVATED), INFLUENZA A VIRUS A/SINGAPORE/INFIMH-16-0019/2016 IVR-186 (H3N2) ANTIGEN (FORMALDEHYDE INACTIVATED), INFLUENZA B VIRUS B/PHUKET/3073/2013 ANTIGEN (FORMALDEHYDE INACTIVATED), AND INFLUENZA B VIRUS B/MARYLAND/15/2016 BX-69A ANTIGEN (FORMALDEHYDE INACTIVATED) 0.5 ML: 15; 15; 15; 15 INJECTION, SUSPENSION INTRAMUSCULAR at 18:49

## 2018-10-05 NOTE — PLAN OF CARE
Problem: Postpartum (Vaginal Delivery) (Adult,Obstetrics,Pediatric)  Goal: Signs and Symptoms of Listed Potential Problems Will be Absent, Minimized or Managed (Postpartum)  Signs and symptoms of listed potential problems will be absent, minimized or managed by discharge/transition of care (reference Postpartum (Vaginal Delivery) (Adult,Obstetrics,Pediatric) CPG).   Outcome: Improving   10/04/18 2309   Postpartum (Vaginal Delivery)   Problems Assessed (Postpartum Vaginal Delivery) all   Problems Present (Postpartum Vag Deliv) none     Assessments completed as charted. B/P: 126/84, T: 98.7, P: 70, R: 16. Rates pain: 0/10 declines pain at this time, states has occasional cramping. Voiding without difficulty. Fundus: Midline firm unit(s)/2. Lochia: Light. Activity: normal activity. Infant feeding: Both breast and formula.     LATCH Score: latch not assessed at this time    Postpartum breastfeeding assessment completed and education provided, see Patient Education Activity.  Items included in the education are:     proper positioning and latch    effectiveness of feeding    manual expression    handling and storing breastmilk    maintenance of breastfeeding for the first 6 months    sign/symptoms of infant feeding issues requiring referral to qualified health care provider  Postpartum care education provided, see Patient Education activity. Patient denies needs. Will monitor.  Yoli Meza

## 2018-10-05 NOTE — DISCHARGE SUMMARY
[facility]    Discharge Summary  Obstetrics    Date of Admission:  10/2/2018  Date of Discharge:  10/5/2018  Discharging Provider: Greg Gonzalez    Discharge Diagnoses   NVD  Anemia secondary to delivery    History of Present Illness   Fawn Davis is a 28 year old female who presented with labor    Hospital Course   The patient's hospital course was unremarkable.  She recovered as anticipated and experienced no post-delivery complications.  On discharge, her pain was well controlled. Vaginal bleeding is similar to peak menstrual flow.  Voiding without difficulty.  Ambulating well and tolerating a normal diet.  No fevers.  Breastfeeding well.  Infant is stable, but on Bili lights.  She was discharged on post-partum day # 2.    Post-partum hemoglobin:   Hemoglobin   Date Value Ref [facility] Status   10/04/2018 9.5 (L) 11.7 - 15.7 g/dL Final       Greg Gonzalez    Discharge Disposition   Discharged to home   Condition at discharge: Good    Primary Care Physician   Fransisca Sales    Consultations This Hospital Stay   ANESTHESIOLOGY IP CONSULT  HOME CARE POST PARTUM/ IP CONSULT  LACTATION IP CONSULT    Discharge Orders   No discharge procedures on file.  Discharge Medications   Current Discharge Medication List      CONTINUE these medications which have NOT CHANGED    Details   folic acid (FOLVITE) 1 MG tablet Take 1 tablet (1 mg) by mouth daily  Qty: 100 tablet, Refills: 3    Associated Diagnoses: Routine general medical examination at a health care facility           Allergies   Allergies   Allergen Reactions     Penicillins Hives   anemia is secondary to delivery

## 2018-10-05 NOTE — PROGRESS NOTES
[facility]    Post-Partum Progress Note    Assessment & Plan   Assessment:  Post-partum day #2  Normal spontaneous vaginal delivery    Doing well.  No excessive bleeding  Pain well-controlled.  Tolerating physical therapy and rehabilitation well.  Breast feeding well.  Baby on Bili lights    Plan:  Ambulation encouraged  Breast feeding strategies discussed  Pain control measures as needed  Reportable signs and symptoms dicussed with the patient  RhoGam given  Discharge later today    Greg HAIRSalvatore Gonzalez     Interval History   Doing well.  Pain is well-controlled.  No fevers.  No history of foul-smelling vaginal discharge.  Good appetite.  Denies chest pain, shortness of breath, nausea or vomiting.  Vaginal bleeding is similar to a heavy menstrual flow.  Ambulatory.  Breastfeeding well.  Baby on Bili lights    Medications     - MEDICATION INSTRUCTIONS -       - MEDICATION INSTRUCTIONS -       - MEDICATION INSTRUCTIONS -       oxytocin in 0.9% NaCl Stopped (10/03/18 0500)       influenza vaccine adult (product based on age)  0.5 mL Intramuscular Prior to discharge     senna-docusate  1 tablet Oral BID    Or     senna-docusate  2 tablet Oral BID       Physical Exam   Temp: 98.7  F (37.1  C) Temp src: Oral BP: 126/84 Pulse: 70   Resp: 16 SpO2: 96 %      Vitals:    10/02/18 2206   Weight: 93.9 kg (207 lb)     Vital Signs with Ranges  Temp:  [98  F (36.7  C)-98.7  F (37.1  C)] 98.7  F (37.1  C)  Pulse:  [70-82] 70  Resp:  [16] 16  BP: (126)/(84) 126/84  SpO2:  [96 %] 96 %       Uterine fundus is firm, non-tender and at the level of the umbilicus  Extremities Non-tender  Perineal sutures intact and wound healing well  Heart is regular rate and rhythm and lungs clear to auscultation    Data   Recent Labs   Lab Test  10/04/18   0602  10/02/18   2205   ABO  O  O   RH  Neg  Neg   AS   --   Neg     Recent Labs   Lab Test  10/04/18   0602  10/02/18   2205   HGB  9.5*  11.2*     Recent Labs   Lab Test  05/31/18   1525    RUQIGG  27

## 2018-10-05 NOTE — PLAN OF CARE
Problem: Postpartum (Vaginal Delivery) (Adult,Obstetrics,Pediatric)  Goal: Signs and Symptoms of Listed Potential Problems Will be Absent, Minimized or Managed (Postpartum)  Signs and symptoms of listed potential problems will be absent, minimized or managed by discharge/transition of care (reference Postpartum (Vaginal Delivery) (Adult,Obstetrics,Pediatric) CPG).   Outcome: Adequate for Discharge Date Met: 10/05/18  Assessments completed as charted. B/P: 128/78, T: 98, P: 74, R: 18. Rates pain: 0/10. Voiding without difficulty. Fundus: Midline U/1. Lochia: Light. Activity: unrestricted with out pain . Infant feeding: Breast feeding going well.     LATCH Score:   Latch: 2 - Good Latch  Audible Swallowin - Spontaneous & frequent  Type of Nipple: (Breast/Nipple) 2 - Everted  Comfort: 2 - Soft, Nontender  Hold: 2 - No Assist   Total LATCH Score:  10  Postpartum care education provided, see Patient Education activity. Patient denies needs. Will monitor.  Bonny Trejo

## 2018-10-06 NOTE — PLAN OF CARE
Discharge instructions given to patient. She denies questions at this time. Pt understands resources to call if questions arise. She plans to board in the room to care for baby while baby is here. Discharged in a stable condition.

## 2018-10-12 ENCOUNTER — OFFICE VISIT (OUTPATIENT)
Dept: OBGYN | Facility: OTHER | Age: 28
End: 2018-10-12
Attending: FAMILY MEDICINE
Payer: COMMERCIAL

## 2018-10-12 VITALS
WEIGHT: 188 LBS | DIASTOLIC BLOOD PRESSURE: 74 MMHG | HEART RATE: 64 BPM | OXYGEN SATURATION: 98 % | HEIGHT: 65 IN | SYSTOLIC BLOOD PRESSURE: 118 MMHG | BODY MASS INDEX: 31.32 KG/M2

## 2018-10-12 PROCEDURE — 99207 ZZC NO CHARGE LOS: CPT | Performed by: NURSE PRACTITIONER

## 2018-10-12 ASSESSMENT — PAIN SCALES - GENERAL: PAINLEVEL: NO PAIN (0)

## 2018-10-12 NOTE — PROGRESS NOTES
"SUBJECTIVE:  Fawn Davis is a 28 year old female P2 here for a 1 week postpartum visit.  She had a  on 10/03/18 delivering a healthy baby boy at term.      Today's Depression Rating was 1    delivery complications:  No  breast feeding:  Yes, doing very well.   Declines lactation assessment  bladder problems:  No  bowel problems/hemorrhoids:  No  episiotomy/laceration/incision healed? improving  vaginal flow:  scant  [place]:  No  contraception:  BCP or Depo Provera  emotional adjustment:  doing well, happy and tired  back to work:  6 weeks    OBJECTIVE:  Blood pressure 118/74, pulse 64, height 5' 5\" (1.651 m), weight 188 lb (85.3 kg), last menstrual period 2018, SpO2 98 %, unknown if currently breastfeeding.   General - pleasant female in no acute distress.    ASSESSMENT:  normal 1 week postpartum check    PLAN:  Discussed kegel exercises   Breastfeeding tips and anticipatory guidance provided  RTC at 6 weeks postpartum as scheduled.  "

## 2018-10-12 NOTE — NURSING NOTE
"Chief Complaint   Patient presents with     Post Partum Exam     1 week       Initial /74 (BP Location: Right arm, Patient Position: Sitting, Cuff Size: Adult Regular)  Pulse 64  Ht 5' 5\" (1.651 m)  Wt 188 lb (85.3 kg)  LMP 01/03/2018  SpO2 98%  BMI 31.28 kg/m2 Estimated body mass index is 31.28 kg/(m^2) as calculated from the following:    Height as of this encounter: 5' 5\" (1.651 m).    Weight as of this encounter: 188 lb (85.3 kg).  Medication Reconciliation: complete    Estefania Busby LPN      "

## 2018-10-12 NOTE — PATIENT INSTRUCTIONS
"BREASTFEEDING TIPS  1. Breastfeed every 2-4 hours. If your baby is sleepy - use breast compression, push on chin to \"start up\" baby, switch breasts, undress to diaper and wake before relatching.   Some babies \"cluster\" feed every 1 hour for a while- this is normal. Feed your baby whenever he/she is awake-  even if every hour for a while. This frequent feeding will help you make more milk and encourage your baby to sleep for longer stretches later in the evening or night.    - Position your baby close to you with pillows so he/she is facing you -belly to belly laying horizontally across your lap at the level of your breast and looking a bit \"upwards\" to your breast   -One hand holds the baby's neck behind the ears and the other hand holds your breast  -Baby's nose should start out pointing to your nipple before latching  - Hold your breast in a \"sandwich\" position by gently squeezing your breast in an oval shape and make sure your hands are not covering the areola  This \"nipple sandwich\" will make it easier for your breast to fit inside the baby's mouth-making latching more comfortable for you and baby and preventing sore nipples. Your baby should take a \"mouthful\" of breast!  - You may want to use hand expression to \"prime the pump\" and get a drip of milk out on your nipple to wake baby   (see website: newborns.[employer].edu/Breastfeeding/HandExpression.html)  - Swipe your nipple on baby's upper lip and wait for a BIG open mouth  - YOU bring baby to the breast (hold baby's neck with your fingers just below the ears) and bring baby's head to the breast--leading with the chin.  Try to avoid pushing your breast into baby's mouth- bring baby to you instead!  - Aim to get your baby's bottom lip LOW DOWN ON AREOLA (baby's upper lip just needs to \"clear\" the nipple) .   Your baby should latch onto the areola and NOT just the nipple. That way your baby gets more milk and you don't get sore nipples!      Useful web " sites:  Www.infantrisk.com  Www.aap.org  Www.ibreastfeeding.com  Www.health.[facility].[place].us

## 2018-10-12 NOTE — MR AVS SNAPSHOT
"              After Visit Summary   10/12/2018    Fawn Davis    MRN: [id number]           Patient Information     Date Of Birth          1990        Visit Information        Provider Department      10/12/2018 2:45 PM Pam Colón NP [facility]        Today's Diagnoses     Routine postpartum follow-up    -  1      Care Instructions    BREASTFEEDING TIPS  1. Breastfeed every 2-4 hours. If your baby is sleepy - use breast compression, push on chin to \"start up\" baby, switch breasts, undress to diaper and wake before relatching.   Some babies \"cluster\" feed every 1 hour for a while- this is normal. Feed your baby whenever he/she is awake-  even if every hour for a while. This frequent feeding will help you make more milk and encourage your baby to sleep for longer stretches later in the evening or night.    - Position your baby close to you with pillows so he/she is facing you -belly to belly laying horizontally across your lap at the level of your breast and looking a bit \"upwards\" to your breast   -One hand holds the baby's neck behind the ears and the other hand holds your breast  -Baby's nose should start out pointing to your nipple before latching  - Hold your breast in a \"sandwich\" position by gently squeezing your breast in an oval shape and make sure your hands are not covering the areola  This \"nipple sandwich\" will make it easier for your breast to fit inside the baby's mouth-making latching more comfortable for you and baby and preventing sore nipples. Your baby should take a \"mouthful\" of breast!  - You may want to use hand expression to \"prime the pump\" and get a drip of milk out on your nipple to wake baby   (see website: newborns.[employer].edu/Breastfeeding/HandExpression.html)  - Swipe your nipple on baby's upper lip and wait for a BIG open mouth  - YOU bring baby to the breast (hold baby's neck with your fingers just below the ears) and bring baby's head to the " "breast--leading with the chin.  Try to avoid pushing your breast into baby's mouth- bring baby to you instead!  - Aim to get your baby's bottom lip LOW DOWN ON AREOLA (baby's upper lip just needs to \"clear\" the nipple) .   Your baby should latch onto the areola and NOT just the nipple. That way your baby gets more milk and you don't get sore nipples!      Useful web sites:  Www.infantrisk.com  Www.aap.org  Www.ibreastfeeding.com  Www.health.[facility].mn.us          Follow-ups after your visit        Your next 10 appointments already scheduled     Nov 15, 2018  2:45 PM CST   (Arrive by 2:30 PM)   [facility] with [facility]ero Sanchez MD   [facility]    [address]  [place] [address] [phone]              Who to contact     If you have questions or need follow up information about today's clinic visit or your schedule please contact [facility] directly at [phone].  Normal or non-critical lab and imaging results will be communicated to you by MyChart, letter or phone within 4 business days after the clinic has received the results. If you do not hear from us within 7 days, please contact the clinic through MyChart or phone. If you have a critical or abnormal lab result, we will notify you by phone as soon as possible.  Submit refill requests through [email or web address] or call your pharmacy and they will forward the refill request to us. Please allow 3 business days for your refill to be completed.          Additional Information About Your Visit        Care EveryWhere ID     This is your Care EveryWhere ID. This could be used by other organizations to access your [facility] medical records  [id number]        Your Vitals Were     Pulse Height Last Period Pulse Oximetry BMI (Body Mass Index)       64 5' 5\" (1.651 m) 01/03/2018 98% 31.28 kg/m2        Blood Pressure from Last 3 Encounters:   10/12/18 118/74   10/05/18 134/87   09/27/18 100/68    " Weight from Last 3 Encounters:   10/12/18 188 lb (85.3 kg)   10/02/18 207 lb (93.9 kg)   09/27/18 207 lb (93.9 kg)              Today, you had the following     No orders found for display       Primary Care Provider Office Phone # Fax #    Farnsisca Sales [phone] [address] [address]        Equal Access to Services     NIALL BAUMANN : Hadii aad ku hadasho Soomaali, waaxda luqadaha, qaybta kaalmada adeegyada, waxay idiin hayaan adeeg marlajeffshaun bay . So [facility] [phone].    ATENCIÓN: Si habla español, tiene a perkins disposición servicios gratuitos de asistencia lingüística. Llame al [phone].    We comply with applicable federal civil rights laws and [place] laws. We do not discriminate on the basis of race, color, national origin, age, disability, sex, sexual orientation, or gender identity.            Thank you!     Thank you for choosing [facility]  for your care. Our goal is always to provide you with excellent care. Hearing back from our patients is one way we can continue to improve our services. Please take a few minutes to complete the written survey that you may receive in the mail after your visit with us. Thank you!             Your Updated Medication List - Protect others around you: Learn how to safely use, store and throw away your medicines at www.disposemymeds.org.          This list is accurate as of 10/12/18  3:07 PM.  Always use your most recent med list.                   Brand Name Dispense Instructions for use Diagnosis    folic acid 1 MG tablet    FOLVITE    100 tablet    Take 1 tablet (1 mg) by mouth daily    Routine general medical examination at a health care facility       ibuprofen 800 MG tablet    ADVIL/MOTRIN    40 tablet    Take 1 tablet (800 mg) by mouth every 8 hours as needed for pain Take with a full meal    Single liveborn infant delivered vaginally

## 2018-11-15 ENCOUNTER — PRENATAL OFFICE VISIT (OUTPATIENT)
Dept: OBGYN | Facility: OTHER | Age: 28
End: 2018-11-15
Attending: OBSTETRICS & GYNECOLOGY
Payer: COMMERCIAL

## 2018-11-15 VITALS
HEIGHT: 65 IN | HEART RATE: 57 BPM | WEIGHT: 192 LBS | DIASTOLIC BLOOD PRESSURE: 70 MMHG | SYSTOLIC BLOOD PRESSURE: 120 MMHG | BODY MASS INDEX: 31.99 KG/M2

## 2018-11-15 DIAGNOSIS — Z30.011 ENCOUNTER FOR INITIAL PRESCRIPTION OF CONTRACEPTIVE PILLS: Primary | ICD-10-CM

## 2018-11-15 PROCEDURE — 99207 ZZC POST PARTUM EXAM: CPT | Performed by: OBSTETRICS & GYNECOLOGY

## 2018-11-15 RX ORDER — ACETAMINOPHEN AND CODEINE PHOSPHATE 120; 12 MG/5ML; MG/5ML
0.35 SOLUTION ORAL DAILY
Qty: 90 TABLET | Refills: 3 | Status: SHIPPED | OUTPATIENT
Start: 2018-11-15 | End: 2019-04-08 | Stop reason: ALTCHOICE

## 2018-11-15 ASSESSMENT — PATIENT HEALTH QUESTIONNAIRE - PHQ9: SUM OF ALL RESPONSES TO PHQ QUESTIONS 1-9: 1

## 2018-11-15 NOTE — MR AVS SNAPSHOT
"              After Visit Summary   11/15/2018    Fawn Davis    MRN: [id number]           Patient Information     Date Of Birth          1990        Visit Information        Provider Department      11/15/2018 2:45 PM Romero Sanchez MD [facility]        Today's Diagnoses     Encounter for initial prescription of contraceptive pills    -  1       Follow-ups after your visit        Who to contact     If you have questions or need follow up information about today's clinic visit or your schedule please contact [facility] directly at [phone].  Normal or non-critical lab and imaging results will be communicated to you by MyChart, letter or phone within 4 business days after the clinic has received the results. If you do not hear from us within 7 days, please contact the clinic through MyChart or phone. If you have a critical or abnormal lab result, we will notify you by phone as soon as possible.  Submit refill requests through [employer] or call your pharmacy and they will forward the refill request to us. Please allow 3 business days for your refill to be completed.          Additional Information About Your Visit        Care EveryWhere ID     This is your Care EveryWhere ID. This could be used by other organizations to access your [place] medical records  [id number]        Your Vitals Were     Pulse Height Last Period BMI (Body Mass Index)          57 5' 5\" (1.651 m) 01/03/2018 31.95 kg/m2         Blood Pressure from Last 3 Encounters:   11/15/18 120/70   10/12/18 118/74   10/05/18 134/87    Weight from Last 3 Encounters:   11/15/18 192 lb (87.1 kg)   10/12/18 188 lb (85.3 kg)   10/02/18 207 lb (93.9 kg)              Today, you had the following     No orders found for display         Today's Medication Changes          These changes are accurate as of 11/15/18  2:57 PM.  If you have any questions, ask your nurse or doctor.               Start taking these " medicines.        Dose/Directions    norethindrone 0.35 MG per tablet   Commonly known as:  MICRONOR   Used for:  Encounter for initial prescription of contraceptive pills        Dose:  0.35 mg   Take 1 tablet (0.35 mg) by mouth daily   Quantity:  90 tablet   Refills:  3            Where to get your medicines      These medications were [facility] to [facility] [place] [facility] [address]  [address] [address]     Phone:  [phone]     norethindrone 0.35 MG per tablet                Primary Care Provider Office Phone # Fax #    Fransisca Sales [phone]       [address] [address]        Equal Access to Services     NIALL BAUMANN : Holly Sellers, wamaribethda lusamueladaha, qaybta kaalmada adeegyada, rosa patel. So [facility] [place] [facility] [phone].    ATENCIÓN: Si habla español, tiene a perkins disposición servicios gratuitos de asistencia lingüística. [facility] [phone].    We comply with applicable federal civil rights laws and [place] laws. We do not discriminate on the basis of race, color, national origin, age, disability, sex, sexual orientation, or gender identity.            Thank you!     Thank you for choosing [facility]  for your care. Our goal is always to provide you with excellent care. Hearing back from our patients is one way we can continue to improve our services. Please take a few minutes to complete the written survey that you may receive in the mail after your visit with us. Thank you!             Your Updated Medication List - Protect others around you: Learn how to safely use, store and throw away your medicines at www.disposemymeds.org.          This list is accurate as of 11/15/18  2:57 PM.  Always use your most recent med list.                   Brand Name Dispense Instructions for use Diagnosis    folic acid 1 MG tablet    FOLVITE    100 tablet    Take 1 tablet (1 mg) by mouth daily     Routine general medical examination at a health care facility       norethindrone 0.35 MG per tablet    MICRONOR    90 tablet    Take 1 tablet (0.35 mg) by mouth daily    Encounter for initial prescription of contraceptive pills

## 2018-11-15 NOTE — PROGRESS NOTES
"SUBJECTIVE:  Fawn Davis is a 28 year old female P2 here for a postpartum visit.  She had a   delivering a healthy baby boy  Currently no complaints and doing well.    Today's Depression Rating was 3    delivery complications:  No  breast feeding:  Yes  bladder problems:  No  bowel problems/hemorrhoids:  Mild constipation  episiotomy/laceration/incision healed? Yes  vaginal flow:  None  [place]:  No  contraception:  abstinence  emotional adjustment:  doing well and happy      OBJECTIVE:  Blood pressure 120/70, pulse 57, height 5' 5\" (1.651 m), weight 192 lb (87.1 kg), last menstrual period 2018, unknown if currently breastfeeding.   General - pleasant female in no acute distress.  Breast - no nodularity, asymmetry or nipple discharge bilaterally.  Abdomen - soft, nontender, nondistended, no hepatosplenomegaly.  Pelvic - EG: normal adult female, BUS: within normal limits, Vagina: well rugated, no discharge, Cervix: no lesions or CMT, Uterus: firm, normal sized and nontender, Adnexae: no masses or tenderness.  Rectovaginal - deferred.    ASSESSMENT:  normal postpartum exam.  Released from pregnancy related restrictions    PLAN:  Discussed kegel exercises   May resume normal activities without restrictions  Pap smear Not Done today    The patient will use oral contraceptive for birth control. Full counseling was provided, and all questions answered. Compliance is strongly emphasized.  Return to clinic in one year for an annual, when due for a pap smear or PRN.  Call when done breastfeeding to change to regular combination OCP.     Romero Sanchez  "

## 2018-11-15 NOTE — NURSING NOTE
"Chief Complaint   Patient presents with     Postpartum Care     6 wk pp/  with Dr. Gonzalez on 10/3/18 Boy-Will/ breast feeding going good       Initial /70 (BP Location: Left arm, Cuff Size: Adult Regular)  Pulse 57  Ht 5' 5\" (1.651 m)  Wt 192 lb (87.1 kg)  LMP 2018  BMI 31.95 kg/m2 Estimated body mass index is 31.95 kg/(m^2) as calculated from the following:    Height as of this encounter: 5' 5\" (1.651 m).    Weight as of this encounter: 192 lb (87.1 kg).  Medication Reconciliation: complete    Rain Salazar LPN  "

## 2019-04-08 ENCOUNTER — TELEPHONE (OUTPATIENT)
Dept: FAMILY MEDICINE | Facility: OTHER | Age: 29
End: 2019-04-08

## 2019-04-08 DIAGNOSIS — Z30.011 ENCOUNTER FOR INITIAL PRESCRIPTION OF CONTRACEPTIVE PILLS: Primary | ICD-10-CM

## 2019-04-08 NOTE — TELEPHONE ENCOUNTER
Fawn is done nursing now and would like to change her BCP to something else but not Lenore. She is seen with her sick child today. Will change to Ortho tri cyclen. Discussed that she will need back up for the first part of her first back and if she has break through bleeding on subsequent packs. Follow up for questions or concerns

## 2019-11-21 ENCOUNTER — OFFICE VISIT (OUTPATIENT)
Dept: FAMILY MEDICINE | Facility: OTHER | Age: 29
End: 2019-11-21
Attending: FAMILY MEDICINE
Payer: COMMERCIAL

## 2019-11-21 VITALS
DIASTOLIC BLOOD PRESSURE: 74 MMHG | WEIGHT: 200 LBS | HEIGHT: 65 IN | RESPIRATION RATE: 19 BRPM | HEART RATE: 85 BPM | SYSTOLIC BLOOD PRESSURE: 122 MMHG | BODY MASS INDEX: 33.32 KG/M2 | OXYGEN SATURATION: 99 %

## 2019-11-21 DIAGNOSIS — B07.0 PLANTAR WARTS: Primary | ICD-10-CM

## 2019-11-21 DIAGNOSIS — Z23 NEED FOR PROPHYLACTIC VACCINATION AND INOCULATION AGAINST INFLUENZA: ICD-10-CM

## 2019-11-21 DIAGNOSIS — Z23 IMMUNIZATION DUE: ICD-10-CM

## 2019-11-21 PROCEDURE — 17110 DESTRUCTION B9 LES UP TO 14: CPT | Performed by: FAMILY MEDICINE

## 2019-11-21 PROCEDURE — 90686 IIV4 VACC NO PRSV 0.5 ML IM: CPT | Performed by: FAMILY MEDICINE

## 2019-11-21 PROCEDURE — 90471 IMMUNIZATION ADMIN: CPT | Performed by: FAMILY MEDICINE

## 2019-11-21 ASSESSMENT — PAIN SCALES - GENERAL: PAINLEVEL: NO PAIN (0)

## 2019-11-21 ASSESSMENT — MIFFLIN-ST. JEOR: SCORE: 1633.07

## 2019-11-21 NOTE — PROGRESS NOTES
Subjective     Fawn Davis is a 29 year old female who presents to clinic today for the following health issues:    HPI   Wart      Duration: 4 months    Description (location/character/radiation): wart on right great toe    Intensity:  0/10    Accompanying signs and symptoms: none    History (similar episodes/previous evaluation): None    Precipitating or alleviating factors: None    Therapies tried and outcome: Compound W         Patient Active Problem List   Diagnosis     Rh negative status during pregnancy in third trimester     Normal pregnancy in first trimester     Encounter for triage in pregnant patient     Normal labor     Post term pregnancy     Past Surgical History:   Procedure Laterality Date     BILATERAL > PE TUBES       Fractured Arm       Left and Right Bunionectomy       TONSILLECTOMY       Eagle Teeth Extraction         Social History     Tobacco Use     Smoking status: Never Smoker     Smokeless tobacco: Never Used   Substance Use Topics     Alcohol use: Yes     Comment: Socially     Family History   Problem Relation Age of Onset     Heart Disease Sister         murmer     Hyperlipidemia Mother          Current Outpatient Medications   Medication Sig Dispense Refill     norethindrone-ethinyl estradiol (ORTHO-NOVUM 7/7/7) 0.5/0.75/1-35 MG-MCG tablet Take 1 tablet by mouth daily 3 tablet 3     Allergies   Allergen Reactions     Penicillins Hives     BP Readings from Last 3 Encounters:   11/21/19 122/74   11/15/18 120/70   10/12/18 118/74    Wt Readings from Last 3 Encounters:   11/21/19 90.7 kg (200 lb)   11/15/18 87.1 kg (192 lb)   10/12/18 85.3 kg (188 lb)                      Reviewed and updated as needed this visit by Provider  Tobacco  Allergies  Meds  Med Hx  Surg Hx  Fam Hx  Soc Hx        Review of Systems   ROS COMP: Constitutional, HEENT, cardiovascular, pulmonary, gi and gu systems are negative, except as otherwise noted.      Objective    /74   Pulse 85   Resp 19    "Ht 1.651 m (5' 5\")   Wt 90.7 kg (200 lb)   SpO2 99%   BMI 33.28 kg/m    Body mass index is 33.28 kg/m .  Physical Exam   GENERAL APPEARANCE: healthy, alert and no distress  SKIN: right great toe with wart, 4 mm in size,   treated with cryotherapy after verbal consent obtained with 2 treatments and complete thaw between. Patient tolerated this well.      NEURO: Normal strength and tone, mentation intact and speech normal  PSYCH: mentation appears normal and affect normal/bright            Assessment & Plan     1. Plantar warts  Treated with cryotherapy, bandaid placed. Keep covered, recheck in 4 weeks    2. Immunization due  Influenza vaccine given today       BMI:   Estimated body mass index is 33.28 kg/m  as calculated from the following:    Height as of this encounter: 1.651 m (5' 5\").    Weight as of this encounter: 90.7 kg (200 lb).   Weight management plan: Discussed healthy diet and exercise guidelines            Return in about 4 weeks (around 12/19/2019) for wart.    Fransisca Sales MD  [facility] [place]      "

## 2019-11-21 NOTE — NURSING NOTE
"Chief Complaint   Patient presents with     Wart       Initial /74   Pulse 85   Resp 19   Ht 1.651 m (5' 5\")   Wt 90.7 kg (200 lb)   SpO2 99%   BMI 33.28 kg/m   Estimated body mass index is 33.28 kg/m  as calculated from the following:    Height as of this encounter: 1.651 m (5' 5\").    Weight as of this encounter: 90.7 kg (200 lb).  Medication Reconciliation: complete  Keila Graham LPN      "

## 2020-01-22 ENCOUNTER — TRANSFERRED RECORDS (OUTPATIENT)
Dept: HEALTH INFORMATION MANAGEMENT | Facility: CLINIC | Age: 30
End: 2020-01-22

## 2020-01-23 ENCOUNTER — TELEPHONE (OUTPATIENT)
Dept: FAMILY MEDICINE | Facility: OTHER | Age: 30
End: 2020-01-23

## 2020-01-23 NOTE — TELEPHONE ENCOUNTER
Patient needs insurance referral done for her eye appointment 1/22/2020. Their fax number [phone].

## 2020-01-23 NOTE — TELEPHONE ENCOUNTER
Insurance referral is completed for her eye appointment on 01/22/2020 at [facility].

## 2020-02-07 ENCOUNTER — MYC MEDICAL ADVICE (OUTPATIENT)
Dept: FAMILY MEDICINE | Facility: OTHER | Age: 30
End: 2020-02-07

## 2020-02-07 NOTE — TELEPHONE ENCOUNTER
Yes, I did get a letter from her. We should see you to do an exam and some lab work to evaluate it further

## 2020-02-11 ENCOUNTER — OFFICE VISIT (OUTPATIENT)
Dept: FAMILY MEDICINE | Facility: OTHER | Age: 30
End: 2020-02-11
Attending: FAMILY MEDICINE
Payer: COMMERCIAL

## 2020-02-11 VITALS
OXYGEN SATURATION: 98 % | TEMPERATURE: 98 F | SYSTOLIC BLOOD PRESSURE: 126 MMHG | DIASTOLIC BLOOD PRESSURE: 78 MMHG | BODY MASS INDEX: 33.32 KG/M2 | HEART RATE: 81 BPM | HEIGHT: 65 IN | RESPIRATION RATE: 20 BRPM | WEIGHT: 200 LBS

## 2020-02-11 DIAGNOSIS — H53.9 VISION CHANGES: Primary | ICD-10-CM

## 2020-02-11 PROCEDURE — 99213 OFFICE O/P EST LOW 20 MIN: CPT | Performed by: FAMILY MEDICINE

## 2020-02-11 ASSESSMENT — MIFFLIN-ST. JEOR: SCORE: 1633.07

## 2020-02-11 ASSESSMENT — PAIN SCALES - GENERAL: PAINLEVEL: NO PAIN (0)

## 2020-02-11 NOTE — PROGRESS NOTES
Subjective     Fawn Davis is a 29 year old female who presents to clinic today for the following health issues:    HPI   Eye Problem       Duration: 1/23/2020    Description (location/character/radiation): Dr Velasquez sent a letter in regards to her eye appointment. [place]/static like on the right eye    Intensity:  0/10    Accompanying signs and symptoms: none    History (similar episodes/previous evaluation): None    Precipitating or alleviating factors: None    Therapies tried and outcome: None     On January 20, 2020, in the evening she lost the peripheral right vision in both eyes for about an hour. The right side showed static as on a tv screen not in tune. She went to bed and awakened and this resolved. She went to see Dr Lu Cruz, who did an exam which was normal, and she was advised to follow up here. No symptoms since, no headache and no history of headaches. She is taking birth control pills. Eye exam was baseline for her with myopia noted.       Patient Active Problem List   Diagnosis     Rh negative status during pregnancy in third trimester     Normal pregnancy in first trimester     Encounter for triage in pregnant patient     Normal labor     Post term pregnancy     Past Surgical History:   Procedure Laterality Date     BILATERAL > PE TUBES       Fractured Arm       Left and Right Bunionectomy       TONSILLECTOMY       [place] Teeth Extraction         Social History     Tobacco Use     Smoking status: Never Smoker     Smokeless tobacco: Never Used   Substance Use Topics     Alcohol use: Yes     Comment: Socially     Family History   Problem Relation Age of Onset     Heart Disease Sister         murmer     Hyperlipidemia Mother          Current Outpatient Medications   Medication Sig Dispense Refill     norethindrone-ethinyl estradiol (ORTHO-NOVUM 7/7/7) 0.5/0.75/1-35 MG-MCG tablet Take 1 tablet by mouth daily 3 tablet 3     Allergies   Allergen Reactions     Penicillins Hives     BP Readings  "from Last 3 Encounters:   02/11/20 126/78   11/21/19 122/74   11/15/18 120/70    Wt Readings from Last 3 Encounters:   02/11/20 90.7 kg (200 lb)   11/21/19 90.7 kg (200 lb)   11/15/18 87.1 kg (192 lb)                      Reviewed and updated as needed this visit by Provider         Review of Systems   ROS COMP: Constitutional, HEENT, cardiovascular, pulmonary, gi and gu systems are negative, except as otherwise noted.      Objective    Resp 20   Ht 1.651 m (5' 5\")   Wt 90.7 kg (200 lb)   BMI 33.28 kg/m    Body mass index is 33.28 kg/m .  Physical Exam   GENERAL: healthy, alert and no distress  EYES: Eyes grossly normal to inspection, PERRL and conjunctivae and sclerae normal  HENT: ear canals and TM's normal, nose and mouth without ulcers or lesions  NECK: no adenopathy, no asymmetry, masses, or scars and thyroid normal to palpation  RESP: lungs clear to auscultation - no rales, rhonchi or wheezes  CV: regular rate and rhythm, normal S1 S2, no S3 or S4, no murmur, click or rub, no peripheral edema and peripheral pulses strong, no carotid bruits noted  NEURO: Normal strength and tone, mentation intact and speech normal  PSYCH: mentation appears normal, affect normal/bright            Assessment & Plan     1. Vision changes  Loss of right peripheral vision in both eyes, resolved. Will evaluate further with a carotid ultrasound and MRI of the brain  - US Carotid Bilateral; Future  - MR Brain w/o & w Contrast; Future           Return if symptoms worsen or fail to improve.    Fransisca Sales MD  [facility] - [place]      "

## 2020-02-11 NOTE — NURSING NOTE
"Chief Complaint   Patient presents with     Eye Problem       Initial /78   Pulse 81   Temp 98  F (36.7  C) (Tympanic)   Resp 20   Ht 1.651 m (5' 5\")   Wt 90.7 kg (200 lb)   SpO2 98%   BMI 33.28 kg/m   Estimated body mass index is 33.28 kg/m  as calculated from the following:    Height as of this encounter: 1.651 m (5' 5\").    Weight as of this encounter: 90.7 kg (200 lb).  Medication Reconciliation: complete  Keila Graham LPN    "

## 2020-02-21 ENCOUNTER — HOSPITAL ENCOUNTER (OUTPATIENT)
Dept: MRI IMAGING | Facility: HOSPITAL | Age: 30
End: 2020-02-21
Attending: FAMILY MEDICINE
Payer: COMMERCIAL

## 2020-02-21 ENCOUNTER — HOSPITAL ENCOUNTER (OUTPATIENT)
Dept: ULTRASOUND IMAGING | Facility: HOSPITAL | Age: 30
End: 2020-02-21
Attending: FAMILY MEDICINE
Payer: COMMERCIAL

## 2020-02-21 DIAGNOSIS — H53.9 VISION CHANGES: ICD-10-CM

## 2020-02-21 PROCEDURE — 70553 MRI BRAIN STEM W/O & W/DYE: CPT | Mod: TC

## 2020-02-21 PROCEDURE — A9585 GADOBUTROL INJECTION: HCPCS | Performed by: RADIOLOGY

## 2020-02-21 PROCEDURE — 93880 EXTRACRANIAL BILAT STUDY: CPT | Mod: TC

## 2020-02-21 PROCEDURE — 25500064 ZZH RX 255 OP 636: Performed by: RADIOLOGY

## 2020-02-21 RX ORDER — GADOBUTROL 604.72 MG/ML
7.5 INJECTION INTRAVENOUS ONCE
Status: COMPLETED | OUTPATIENT
Start: 2020-02-21 | End: 2020-02-21

## 2020-02-21 RX ORDER — GADOBUTROL 604.72 MG/ML
2 INJECTION INTRAVENOUS ONCE
Status: COMPLETED | OUTPATIENT
Start: 2020-02-21 | End: 2020-02-21

## 2020-02-21 RX ADMIN — GADOBUTROL 7.5 ML: 604.72 INJECTION INTRAVENOUS at 11:00

## 2020-02-21 RX ADMIN — GADOBUTROL 2 ML: 604.72 INJECTION INTRAVENOUS at 11:00

## 2020-03-02 ENCOUNTER — HEALTH MAINTENANCE LETTER (OUTPATIENT)
Age: 30
End: 2020-03-02

## 2020-03-05 DIAGNOSIS — Z30.011 ENCOUNTER FOR INITIAL PRESCRIPTION OF CONTRACEPTIVE PILLS: ICD-10-CM

## 2020-03-05 NOTE — TELEPHONE ENCOUNTER
alyacen 7/7/7 0.5/0.75/1-35 MG/MCG      Last Written Prescription Date:  4-8-19  Last Fill Quantity: 3,   # refills: 3  Last Office Visit: 2-  Future Office visit:       Routing refill request to provider for review/approval because:

## 2020-03-10 RX ORDER — NORETHINDRONE AND ETHINYL ESTRADIOL
KIT
Qty: 84 TABLET | Refills: 0 | Status: SHIPPED | OUTPATIENT
Start: 2020-03-10 | End: 2020-03-17

## 2020-06-20 ENCOUNTER — MYC MEDICAL ADVICE (OUTPATIENT)
Dept: FAMILY MEDICINE | Facility: OTHER | Age: 30
End: 2020-06-20

## 2020-06-20 DIAGNOSIS — Z30.011 ENCOUNTER FOR INITIAL PRESCRIPTION OF CONTRACEPTIVE PILLS: ICD-10-CM

## 2020-06-22 RX ORDER — NORETHINDRONE AND ETHINYL ESTRADIOL
1 KIT DAILY
Qty: 84 TABLET | Refills: 3 | Status: SHIPPED | OUTPATIENT
Start: 2020-06-22 | End: 2020-12-01

## 2020-09-21 ENCOUNTER — MYC MEDICAL ADVICE (OUTPATIENT)
Dept: FAMILY MEDICINE | Facility: OTHER | Age: 30
End: 2020-09-21

## 2020-09-21 NOTE — TELEPHONE ENCOUNTER
Mauro Davis 10/3/2018     Current immunizations he has had.   DTP/aP  12/05/2018  1 of 5  Pediarix  Full    No    DTP/aP  02/14/2019  2 of 5  Pediarix  Full    No    DTP/aP  04/18/2019  3 of 5  Pediarix  Full    No    DTP/aP  01/28/2020  4 of 5  Infanrix  Full    No

## 2020-10-02 ENCOUNTER — ALLIED HEALTH/NURSE VISIT (OUTPATIENT)
Dept: FAMILY MEDICINE | Facility: OTHER | Age: 30
End: 2020-10-02
Attending: FAMILY MEDICINE
Payer: COMMERCIAL

## 2020-10-02 DIAGNOSIS — Z23 NEED FOR PROPHYLACTIC VACCINATION AND INOCULATION AGAINST INFLUENZA: Primary | ICD-10-CM

## 2020-10-02 PROCEDURE — 90682 RIV4 VACC RECOMBINANT DNA IM: CPT

## 2020-10-02 PROCEDURE — 99207 PR NO CHARGE NURSE ONLY: CPT

## 2020-10-02 PROCEDURE — 90471 IMMUNIZATION ADMIN: CPT

## 2020-10-30 ENCOUNTER — TELEPHONE (OUTPATIENT)
Dept: FAMILY MEDICINE | Facility: OTHER | Age: 30
End: 2020-10-30

## 2020-10-30 DIAGNOSIS — Z11.59 SCREENING FOR VIRAL DISEASE: Primary | ICD-10-CM

## 2020-10-31 ENCOUNTER — MYC MEDICAL ADVICE (OUTPATIENT)
Dept: OBGYN | Facility: OTHER | Age: 30
End: 2020-10-31

## 2020-11-27 ENCOUNTER — MYC MEDICAL ADVICE (OUTPATIENT)
Dept: OBGYN | Facility: OTHER | Age: 30
End: 2020-11-27

## 2020-12-01 ENCOUNTER — PRENATAL OFFICE VISIT (OUTPATIENT)
Dept: OBGYN | Facility: OTHER | Age: 30
End: 2020-12-01
Attending: OBSTETRICS & GYNECOLOGY
Payer: COMMERCIAL

## 2020-12-01 VITALS
OXYGEN SATURATION: 100 % | HEART RATE: 83 BPM | BODY MASS INDEX: 33.32 KG/M2 | HEIGHT: 65 IN | DIASTOLIC BLOOD PRESSURE: 74 MMHG | WEIGHT: 200 LBS | SYSTOLIC BLOOD PRESSURE: 120 MMHG

## 2020-12-01 DIAGNOSIS — Z34.81 ENCOUNTER FOR SUPERVISION OF OTHER NORMAL PREGNANCY IN FIRST TRIMESTER: Primary | ICD-10-CM

## 2020-12-01 DIAGNOSIS — Z34.91 PREGNANCY WITH UNCERTAIN DATES IN FIRST TRIMESTER: ICD-10-CM

## 2020-12-01 PROCEDURE — 99207 PR FIRST OB VISIT: CPT | Performed by: OBSTETRICS & GYNECOLOGY

## 2020-12-01 PROCEDURE — 76817 TRANSVAGINAL US OBSTETRIC: CPT | Performed by: OBSTETRICS & GYNECOLOGY

## 2020-12-01 RX ORDER — FOLIC ACID 0.8 MG
800 TABLET ORAL DAILY
COMMUNITY
End: 2022-10-17

## 2020-12-01 SDOH — HEALTH STABILITY: MENTAL HEALTH: HOW OFTEN DO YOU HAVE SIX OR MORE DRINKS ON ONE OCCASION?: NOT ASKED

## 2020-12-01 SDOH — HEALTH STABILITY: MENTAL HEALTH: HOW OFTEN DO YOU HAVE 6 OR MORE DRINKS ON ONE OCCASION?: NOT ASKED

## 2020-12-01 SDOH — HEALTH STABILITY: MENTAL HEALTH: HOW MANY STANDARD DRINKS CONTAINING ALCOHOL DO YOU HAVE ON A TYPICAL DAY?: NOT ASKED

## 2020-12-01 SDOH — HEALTH STABILITY: MENTAL HEALTH: HOW OFTEN DO YOU HAVE A DRINK CONTAINING ALCOHOL?: NOT ASKED

## 2020-12-01 SDOH — HEALTH STABILITY: MENTAL HEALTH: HOW MANY DRINKS CONTAINING ALCOHOL DO YOU HAVE ON A TYPICAL DAY WHEN YOU ARE DRINKING?: NOT ASKED

## 2020-12-01 ASSESSMENT — MIFFLIN-ST. JEOR: SCORE: 1628.07

## 2020-12-01 ASSESSMENT — PAIN SCALES - GENERAL: PAINLEVEL: NO PAIN (0)

## 2020-12-01 NOTE — PROGRESS NOTES
"  HPI:  Fawn Davis is a 30 year old female  (vag).  LMP (approx )  She denies vaginal bleeding, nausea , vomiting and abdominal pain.   No other c/o.    Past Medical History:   Diagnosis Date     Contraceptive use        Past Surgical History:   Procedure Laterality Date     BILATERAL > PE TUBES       Fractured Arm       Left and Right Bunionectomy       TONSILLECTOMY       [place] Teeth Extraction         Allergies: Penicillins     ROS:   Denies fever, wt loss   Neg /GI other than per HPI      EXAM:  Blood pressure 120/74, pulse 83, height 1.651 m (5' 5\"), weight 90.7 kg (200 lb), last menstrual period 10/06/2020, SpO2 100 %, unknown if currently breastfeeding.   BMI= Body mass index is 33.28 kg/m .  General - pleasant female in no acute distress.  Abdomen - soft, nontender, nondistended, no hepatosplenomegaly.  Pelvic - EG: normal adult female, BUS: within normal limits,Rectovaginal - deferred.    US:    Transvaginal:Yes  Yolk sac present:Yes  CRL:  21mm  FCA present:Yes 158  EGA 8w 5d  MAURA:21          ASSESSMENT/PLAN:  (Z34.81) Encounter for supervision of other normal pregnancy in first trimester  (primary encounter diagnosis)  Comment: Viable IUP with uncertain dates, Use US MAURA  Plan: CBC with platelets, UA with Microscopic, Urine         Culture Aerobic Bacterial, HIV Antigen Antibody        Combo, Rubella Antibody IgG Quantitative,         Hepatitis B surface antigen, Treponema Abs w         Reflex to RPR and Titer, ABO/Rh type and         screen, Urine Drugs of Abuse Screen Panel 13            1st Trimester precautions and testing discussed.  New OB Labs ordered and exam scheduled.  Aneuploidy testing options discussed.  Patient has my card and phone number to call prn if problems in interim.    Romero Sanchez MD  "

## 2020-12-01 NOTE — NURSING NOTE
"Chief Complaint   Patient presents with     Prenatal Care     pre new LMP- 10/6/20 GA- 8 w       Initial /74 (BP Location: Left arm, Cuff Size: Adult Regular)   Pulse 83   Ht 1.651 m (5' 5\")   Wt 90.7 kg (200 lb)   LMP 10/06/2020   SpO2 100%   BMI 33.28 kg/m   Estimated body mass index is 33.28 kg/m  as calculated from the following:    Height as of this encounter: 1.651 m (5' 5\").    Weight as of this encounter: 90.7 kg (200 lb).  Medication Reconciliation: complete  Rain Salazar LPN  "

## 2020-12-14 ENCOUNTER — HEALTH MAINTENANCE LETTER (OUTPATIENT)
Age: 30
End: 2020-12-14

## 2020-12-23 ENCOUNTER — MYC MEDICAL ADVICE (OUTPATIENT)
Dept: FAMILY MEDICINE | Facility: OTHER | Age: 30
End: 2020-12-23

## 2020-12-24 ENCOUNTER — OFFICE VISIT (OUTPATIENT)
Dept: FAMILY MEDICINE | Facility: OTHER | Age: 30
End: 2020-12-24
Attending: FAMILY MEDICINE
Payer: COMMERCIAL

## 2020-12-24 DIAGNOSIS — Z11.59 SCREENING FOR VIRAL DISEASE: ICD-10-CM

## 2020-12-24 PROCEDURE — U0003 INFECTIOUS AGENT DETECTION BY NUCLEIC ACID (DNA OR RNA); SEVERE ACUTE RESPIRATORY SYNDROME CORONAVIRUS 2 (SARS-COV-2) (CORONAVIRUS DISEASE [COVID-19]), AMPLIFIED PROBE TECHNIQUE, MAKING USE OF HIGH THROUGHPUT TECHNOLOGIES AS DESCRIBED BY CMS-2020-01-R: HCPCS | Performed by: FAMILY MEDICINE

## 2020-12-26 LAB
SARS-COV-2 RNA SPEC QL NAA+PROBE: ABNORMAL
SPECIMEN SOURCE: ABNORMAL

## 2020-12-27 ENCOUNTER — MYC MEDICAL ADVICE (OUTPATIENT)
Dept: OBGYN | Facility: OTHER | Age: 30
End: 2020-12-27

## 2020-12-28 ENCOUNTER — MYC MEDICAL ADVICE (OUTPATIENT)
Dept: FAMILY MEDICINE | Facility: OTHER | Age: 30
End: 2020-12-28

## 2021-01-15 ENCOUNTER — PRENATAL OFFICE VISIT (OUTPATIENT)
Dept: OBGYN | Facility: OTHER | Age: 31
End: 2021-01-15
Attending: NURSE PRACTITIONER
Payer: COMMERCIAL

## 2021-01-15 VITALS
OXYGEN SATURATION: 100 % | BODY MASS INDEX: 32.57 KG/M2 | HEART RATE: 84 BPM | WEIGHT: 195.5 LBS | DIASTOLIC BLOOD PRESSURE: 68 MMHG | SYSTOLIC BLOOD PRESSURE: 123 MMHG | HEIGHT: 65 IN

## 2021-01-15 DIAGNOSIS — Z34.82 ENCOUNTER FOR SUPERVISION OF OTHER NORMAL PREGNANCY IN SECOND TRIMESTER: ICD-10-CM

## 2021-01-15 DIAGNOSIS — Z12.4 SCREENING FOR CERVICAL CANCER: ICD-10-CM

## 2021-01-15 DIAGNOSIS — Z34.81 ENCOUNTER FOR SUPERVISION OF OTHER NORMAL PREGNANCY IN FIRST TRIMESTER: ICD-10-CM

## 2021-01-15 DIAGNOSIS — Z20.822 EXPOSURE TO COVID-19 VIRUS: Primary | ICD-10-CM

## 2021-01-15 DIAGNOSIS — Z34.82 NORMAL PREGNANCY IN MULTIGRAVIDA IN SECOND TRIMESTER: ICD-10-CM

## 2021-01-15 PROBLEM — Z37.9 NORMAL LABOR: Status: RESOLVED | Noted: 2018-10-02 | Resolved: 2021-01-15

## 2021-01-15 PROBLEM — O48.0 POST TERM PREGNANCY: Status: RESOLVED | Noted: 2018-10-03 | Resolved: 2021-01-15

## 2021-01-15 PROBLEM — Z36.89 ENCOUNTER FOR TRIAGE IN PREGNANT PATIENT: Status: RESOLVED | Noted: 2018-10-02 | Resolved: 2021-01-15

## 2021-01-15 PROBLEM — Z34.91 NORMAL PREGNANCY IN FIRST TRIMESTER: Status: RESOLVED | Noted: 2018-03-22 | Resolved: 2021-01-15

## 2021-01-15 LAB
ABO + RH BLD: NORMAL
ABO + RH BLD: NORMAL
ALBUMIN UR-MCNC: 10 MG/DL
AMPHETAMINES UR QL: NOT DETECTED NG/ML
APPEARANCE UR: ABNORMAL
BACTERIA #/AREA URNS HPF: ABNORMAL /HPF
BARBITURATES UR QL SCN: NOT DETECTED NG/ML
BENZODIAZ UR QL SCN: NOT DETECTED NG/ML
BILIRUB UR QL STRIP: NEGATIVE
BLD GP AB SCN SERPL QL: NORMAL
BLOOD BANK CMNT PATIENT-IMP: NORMAL
BUPRENORPHINE UR QL: NOT DETECTED NG/ML
CANNABINOIDS UR QL: NOT DETECTED NG/ML
COCAINE UR QL SCN: NOT DETECTED NG/ML
COLOR UR AUTO: YELLOW
D-METHAMPHET UR QL: NOT DETECTED NG/ML
ERYTHROCYTE [DISTWIDTH] IN BLOOD BY AUTOMATED COUNT: 12.6 % (ref 10–15)
GLUCOSE UR STRIP-MCNC: NEGATIVE MG/DL
HCT VFR BLD AUTO: 38.5 % (ref 35–47)
HGB BLD-MCNC: 12.9 G/DL (ref 11.7–15.7)
HGB UR QL STRIP: NEGATIVE
KETONES UR STRIP-MCNC: NEGATIVE MG/DL
LEUKOCYTE ESTERASE UR QL STRIP: ABNORMAL
MCH RBC QN AUTO: 30.1 PG (ref 26.5–33)
MCHC RBC AUTO-ENTMCNC: 33.5 G/DL (ref 31.5–36.5)
MCV RBC AUTO: 90 FL (ref 78–100)
METHADONE UR QL SCN: NOT DETECTED NG/ML
MUCOUS THREADS #/AREA URNS LPF: PRESENT /LPF
NITRATE UR QL: NEGATIVE
OPIATES UR QL SCN: NOT DETECTED NG/ML
OXYCODONE UR QL SCN: NOT DETECTED NG/ML
PCP UR QL SCN: NOT DETECTED NG/ML
PH UR STRIP: 6.5 PH (ref 4.7–8)
PLATELET # BLD AUTO: 229 10E9/L (ref 150–450)
PROPOXYPH UR QL: NOT DETECTED NG/ML
RBC # BLD AUTO: 4.28 10E12/L (ref 3.8–5.2)
RBC #/AREA URNS AUTO: 3 /HPF (ref 0–2)
SOURCE: ABNORMAL
SP GR UR STRIP: 1.02 (ref 1–1.03)
SPECIMEN EXP DATE BLD: NORMAL
SQUAMOUS #/AREA URNS AUTO: 11 /HPF (ref 0–1)
TRICYCLICS UR QL SCN: NOT DETECTED NG/ML
UROBILINOGEN UR STRIP-MCNC: NORMAL MG/DL (ref 0–2)
WBC # BLD AUTO: 7.5 10E9/L (ref 4–11)
WBC #/AREA URNS AUTO: 6 /HPF (ref 0–5)

## 2021-01-15 PROCEDURE — 86780 TREPONEMA PALLIDUM: CPT | Mod: 90 | Performed by: OBSTETRICS & GYNECOLOGY

## 2021-01-15 PROCEDURE — 99207 PR PRENATAL VISIT: CPT | Performed by: NURSE PRACTITIONER

## 2021-01-15 PROCEDURE — G0123 SCREEN CERV/VAG THIN LAYER: HCPCS | Performed by: NURSE PRACTITIONER

## 2021-01-15 PROCEDURE — 86850 RBC ANTIBODY SCREEN: CPT | Performed by: OBSTETRICS & GYNECOLOGY

## 2021-01-15 PROCEDURE — 85027 COMPLETE CBC AUTOMATED: CPT | Performed by: OBSTETRICS & GYNECOLOGY

## 2021-01-15 PROCEDURE — 87340 HEPATITIS B SURFACE AG IA: CPT | Performed by: OBSTETRICS & GYNECOLOGY

## 2021-01-15 PROCEDURE — 36415 COLL VENOUS BLD VENIPUNCTURE: CPT | Performed by: OBSTETRICS & GYNECOLOGY

## 2021-01-15 PROCEDURE — 86901 BLOOD TYPING SEROLOGIC RH(D): CPT | Performed by: OBSTETRICS & GYNECOLOGY

## 2021-01-15 PROCEDURE — 81001 URINALYSIS AUTO W/SCOPE: CPT | Mod: 59 | Performed by: OBSTETRICS & GYNECOLOGY

## 2021-01-15 PROCEDURE — 86762 RUBELLA ANTIBODY: CPT | Performed by: OBSTETRICS & GYNECOLOGY

## 2021-01-15 PROCEDURE — 86769 SARS-COV-2 COVID-19 ANTIBODY: CPT | Performed by: NURSE PRACTITIONER

## 2021-01-15 PROCEDURE — 87624 HPV HI-RISK TYP POOLED RSLT: CPT | Performed by: NURSE PRACTITIONER

## 2021-01-15 PROCEDURE — 87086 URINE CULTURE/COLONY COUNT: CPT | Performed by: OBSTETRICS & GYNECOLOGY

## 2021-01-15 PROCEDURE — 87389 HIV-1 AG W/HIV-1&-2 AB AG IA: CPT | Performed by: OBSTETRICS & GYNECOLOGY

## 2021-01-15 PROCEDURE — 86900 BLOOD TYPING SEROLOGIC ABO: CPT | Performed by: OBSTETRICS & GYNECOLOGY

## 2021-01-15 PROCEDURE — 80306 DRUG TEST PRSMV INSTRMNT: CPT | Performed by: OBSTETRICS & GYNECOLOGY

## 2021-01-15 ASSESSMENT — PATIENT HEALTH QUESTIONNAIRE - PHQ9: SUM OF ALL RESPONSES TO PHQ QUESTIONS 1-9: 0

## 2021-01-15 ASSESSMENT — PAIN SCALES - GENERAL: PAINLEVEL: NO PAIN (0)

## 2021-01-15 ASSESSMENT — MIFFLIN-ST. JEOR: SCORE: 1607.66

## 2021-01-15 NOTE — NURSING NOTE
"Chief Complaint   Patient presents with     Prenatal Care     New OB 15 weeks 1 day       Initial /68 (BP Location: Left arm, Patient Position: Sitting, Cuff Size: Adult Regular)   Pulse 84   Ht 1.651 m (5' 5\")   Wt 88.7 kg (195 lb 8 oz)   LMP 09/30/2020 (Approximate)   SpO2 100%   BMI 32.53 kg/m   Estimated body mass index is 32.53 kg/m  as calculated from the following:    Height as of this encounter: 1.651 m (5' 5\").    Weight as of this encounter: 88.7 kg (195 lb 8 oz).  Medication Reconciliation: complete     Estefania Busby, DREW    "

## 2021-01-15 NOTE — PATIENT INSTRUCTIONS
Patient Education     The Range of Pap Test Results  When your Pap test is sent to the lab, the lab studies your cell samples and reports any abnormal cell changes. Your healthcare provider can discuss these changes with you. In some cases, an abnormal Pap test is due to an infection. More serious cell changes range from dysplasia to cancer. Talk to your healthcare provider about your Pap test.    Normal results  Cervical cells, even normal ones, are always changing. As they mature, normal squamous cells move from deeper layers within the cervix. Over time, these cells flatten and cover the surface of the cervix. Within the cervical canal, the cells are different. These glandular cells are taller and not as flat as the cells on the surface of the cervix. When a Pap test sample shows healthy cells of both types, the results are negative. Keep having Pap tests as often as directed.  Abnormal results  A positive Pap test result means some cells in the sample showed abnormal changes. These results are grouped by the type of cell change and the location, or extent, of the changes. Depending on the results, you may need further testing.    Inflammation. Noncancerous changes are present. They may be due to normal cell repair. Or, they may be caused by an infection, such as HPV or yeast. Further testing may be needed. (Also called reactive cellular changes.)    Atypical squamous cells. Test results are unclear. Cells on the surface of the cervix show changes, but their significance is not yet known. Testing for HPV and other sexually transmitted infections (STIs) may be needed. Treatment may be required. (Reported as ASC-US or ASC-H.)    Atypical glandular cells. Cells lining the cervical canal show abnormal changes. Further testing is likely. You may also have treatment to destroy or remove problem cells. (Reported as AGC.)    Mild dysplasia. Cells show distinct changes. More testing or HPV typing may be done. You may  also have treatment to destroy or remove problem cells. (Reported as low-grade REYNOLD or YONI 1.)    Moderate to severe dysplasia. Cells show precancerous changes. Or, noninvasive cancer (carcinoma in situ) may be present. Treatment to destroy or remove problem cells is likely. (Reported as high-grade REYNOLD or YONI 2 or YONI 3.)    Cancer. Different types of cancer may be detected by your Pap test. More tests to assess the cancer's extent are likely. The type of treatment will depend on the test results and other factors, such as age and health history. (Reported as squamous cell carcinoma, endocervical adenocarcinoma in situ, or adenocarcinoma.)  [employer] last reviewed this educational content on 8/1/2017 2000-2020 [employer]. [address] [place] [address], [place] [address]. All rights reserved. This information is not intended as a substitute for professional medical care. Always follow your healthcare professional's instructions.           Patient Education     Pregnancy: Your Second Trimester Changes  Each day, you and your baby are changing and growing together. Here s a quick look at what s happening to both of you.  How you are changing  Even when you don t notice it, your body is adapting to meet the needs of your growing baby. The changes in your body might also affect your moods.  Your body  Your uterus expands as baby grows. As the weeks go by, you will feel more pressure on your bladder, stomach, and other organs. You may notice some skin color changes on your forehead, nose, or cheeks. Freckles may darken, and moles may grow. You may notice a darker line on your abdomen between your belly button and pubic bone in the midline.  Your moods  The second trimester is often easier than the first. Still, be prepared for mood swings. These are due to the increase in hormones (chemicals that affect the way organs work) produced by your body. These mood swings are a normal part of pregnancy.  How your baby is  growing          Month 4  Baby s heartbeat may be heard with a Doppler (hand-held ultrasound device) by 9 to 10 weeks. Eyebrows, eyelashes, and fingernails begin to form.  Month 5  You may feel your baby move. After a growth spurt, your baby nears 10 inches. Month 6  Baby s fingerprints have formed. Your baby weighs about 1 to 2 pounds and is about 12 inches long.   [employer] last reviewed this educational content on 1/1/2018 2000-2020 [employer]. [address] [place], [place] [id number]. All rights reserved. This information is not intended as a substitute for professional medical care. Always follow your healthcare professional's instructions.           Patient Education     Adapting to Pregnancy: Second Trimester    Keep up the healthy habits you started in your first trimester. You might be a little more tired than normal. So plan your day wisely. Look at the tips below and choose the ones that suit your lifestyle.  If you have any questions, check with your healthcare provider.  If you work  If you can, adjust your work with your employer to fit your needs. Try these tips:    If you stand for long periods, find ways to do some tasks while sitting. Also, try to stand with 1 foot resting on a low stool or ledge. Shift your weight from foot to foot often. Wear low-heeled shoes.    If you sit, keep your knees level with your hips. Rest your feet on a firm surface. Sit tall with support for your low back.    If you work long hours, ask about adjusting your schedule. Try taking shorter breaks more often.  When you travel  The second trimester may be the best time for any travel. Talk to your healthcare provider about any special plans you may need to make. Always:    Wear a seat belt. Fasten the lap part under your belly. Wear the shoulder part also.    Take breaks often during long trips by car or plane. Move around to stretch your legs.    Drink plenty of fluids on flights. The air in plane cabins  is very dry.    Avoid hot climates or high altitudes if you are not used to them.    Avoid places where the food and water might make you sick.    Make sure you are up-to-date on all immunizations, including the flu vaccine. This is especially important when traveling overseas.  Taking time to relax  Find time to rest and relax at work or at home:    Take short time-outs daily. Do relaxation exercises.    Breathe deeply during stressful times.    Try not to take on too much. Plan tasks for times when you have the most energy.    Take naps when you can. Or just sit and relax.    After week 16, avoid lying on your back for more than a few minutes. Instead, lie on your side. Switch sides often.  Continuing as lovers  Unless your healthcare provider tells you otherwise, there is no reason to stop having sex now. Blood supply increases to the pelvic area in the second trimester. Because of this, sex might be more enjoyable. Try different positions and see what s best. Also, talk to your partner about any changes in desire. Spotting may happen after sex. Be sure to let your healthcare provider know if there is heavy bleeding.  Keeping your environment safe  You can still clean house and use scented products. Just take some simple precautions:    Wear gloves when using cleaning fluids.    Open windows to let in fresh air. Use a fan if you paint.    Avoid secondhand smoke.    Don t breathe fumes from nail polish, hair spray, cleansers, or other chemicals.  [employer] last reviewed this educational content on 1/1/2018 2000-2020 [employer]. [address] [place] [address], [place] [address]. All rights reserved. This information is not intended as a substitute for professional medical care. Always follow your healthcare professional's instructions.

## 2021-01-15 NOTE — PROGRESS NOTES
"  HPI:  Fawn Davis is a 30 year old female Patient's last menstrual period was 09/30/2020 (approximate). at 15w1d, Estimated Date of Delivery: Jul 8, 2021.  She denies vaginal bleeding, nausea , vomiting and abdominal pain. Feeling flutters.   No other c/o.    Past Medical History:   Diagnosis Date     Contraceptive use        Past Surgical History:   Procedure Laterality Date     BILATERAL > PE TUBES       Fractured Arm       Left and Right Bunionectomy       TONSILLECTOMY       [place] Teeth Extraction         Allergies: Penicillins     EXAM:  Blood pressure 123/68, pulse 84, height 1.651 m (5' 5\"), weight 88.7 kg (195 lb 8 oz), last menstrual period 09/30/2020, SpO2 100 %, unknown if currently breastfeeding.   BMI= Body mass index is 32.53 kg/m .  General - pleasant female in no acute distress.  Neck - supple without lymphadenopathy or thyromegaly.  Lungs - clear to auscultation bilaterally.  Heart - regular rate and rhythm without murmur.  Abdomen - soft, nontender, nondistended, no hepatosplenomegaly.  Pelvic - EG: normal adult female, BUS: within normal limits, Vagina: well rugated, no discharge, Cervix: no lesions or CMT, closed/long Uterus: gravid, consistant with dates, mobile, Adnexae: no masses or tenderness.  Rectovaginal - deferred.  Musculoskeletal - no gross deformities.  Neurological - normal strength, sensation, and mental status.    Doptones were 156    ASSESSMENT/PLAN:  (Z20.822) Exposure to COVID-19 virus  (primary encounter diagnosis)  Comment:   Plan: COVID-19 Virus (Coronavirus) Antibody & Titer         Reflex            (Z12.4) Screening for cervical cancer  Comment:   Plan: A pap thin layer screen with  HPV - recommended        age 30 - 65 years (select HPV order below), HPV        High Risk Types DNA Cervical            (Z34.82) Encounter for supervision of other normal pregnancy in second trimester  Comment:   Plan: US OB > 14 Weeks            (Z34.82) Normal pregnancy in multigravida " in second trimester  Comment:   Plan: return in 4 weeks.       Weight gain and exercise during pregnancy was discussed at today's visit.  The patient will return to clinic in 4 weeks for continued prenatal care.

## 2021-01-16 LAB
BACTERIA SPEC CULT: ABNORMAL
SPECIMEN SOURCE: ABNORMAL
T PALLIDUM AB SER QL: NONREACTIVE

## 2021-01-18 DIAGNOSIS — Z34.82 NORMAL PREGNANCY IN MULTIGRAVIDA IN SECOND TRIMESTER: Primary | ICD-10-CM

## 2021-01-18 LAB
HBV SURFACE AG SERPL QL IA: NONREACTIVE
HIV 1+2 AB+HIV1 P24 AG SERPL QL IA: NONREACTIVE
RUBV IGG SERPL IA-ACNC: 34 IU/ML

## 2021-01-19 LAB
COPATH REPORT: NORMAL
PAP: NORMAL
SARS-COV-2 AB PNL SERPL IA: NEGATIVE
SARS-COV-2 IGG SERPL IA-ACNC: NORMAL

## 2021-01-21 LAB
FINAL DIAGNOSIS: NORMAL
HPV HR 12 DNA CVX QL NAA+PROBE: NEGATIVE
HPV16 DNA SPEC QL NAA+PROBE: NEGATIVE
HPV18 DNA SPEC QL NAA+PROBE: NEGATIVE
SPECIMEN DESCRIPTION: NORMAL
SPECIMEN SOURCE CVX/VAG CYTO: NORMAL

## 2021-02-09 ENCOUNTER — PRENATAL OFFICE VISIT (OUTPATIENT)
Dept: OBGYN | Facility: OTHER | Age: 31
End: 2021-02-09
Attending: NURSE PRACTITIONER
Payer: COMMERCIAL

## 2021-02-09 VITALS
DIASTOLIC BLOOD PRESSURE: 72 MMHG | WEIGHT: 194 LBS | BODY MASS INDEX: 32.32 KG/M2 | HEIGHT: 65 IN | SYSTOLIC BLOOD PRESSURE: 110 MMHG

## 2021-02-09 DIAGNOSIS — R82.90 ABNORMAL URINE FINDINGS: Primary | ICD-10-CM

## 2021-02-09 DIAGNOSIS — Z34.82 NORMAL PREGNANCY IN MULTIGRAVIDA IN SECOND TRIMESTER: ICD-10-CM

## 2021-02-09 LAB
ALBUMIN UR-MCNC: NEGATIVE MG/DL
APPEARANCE UR: CLEAR
BACTERIA #/AREA URNS HPF: ABNORMAL /HPF
BILIRUB UR QL STRIP: NEGATIVE
COLOR UR AUTO: ABNORMAL
GLUCOSE UR STRIP-MCNC: NEGATIVE MG/DL
HGB UR QL STRIP: NEGATIVE
KETONES UR STRIP-MCNC: NEGATIVE MG/DL
LEUKOCYTE ESTERASE UR QL STRIP: ABNORMAL
MUCOUS THREADS #/AREA URNS LPF: PRESENT /LPF
NITRATE UR QL: NEGATIVE
PH UR STRIP: 6 PH (ref 4.7–8)
RBC #/AREA URNS AUTO: 3 /HPF (ref 0–2)
SOURCE: ABNORMAL
SP GR UR STRIP: 1 (ref 1–1.03)
SQUAMOUS #/AREA URNS AUTO: 6 /HPF (ref 0–1)
UROBILINOGEN UR STRIP-MCNC: NORMAL MG/DL (ref 0–2)
WBC #/AREA URNS AUTO: 2 /HPF (ref 0–5)

## 2021-02-09 PROCEDURE — 81001 URINALYSIS AUTO W/SCOPE: CPT | Performed by: OBSTETRICS & GYNECOLOGY

## 2021-02-09 PROCEDURE — 99207 PR PRENATAL VISIT: CPT | Performed by: NURSE PRACTITIONER

## 2021-02-09 PROCEDURE — 87086 URINE CULTURE/COLONY COUNT: CPT | Performed by: OBSTETRICS & GYNECOLOGY

## 2021-02-09 ASSESSMENT — PAIN SCALES - GENERAL: PAINLEVEL: NO PAIN (0)

## 2021-02-09 ASSESSMENT — MIFFLIN-ST. JEOR: SCORE: 1600.86

## 2021-02-09 NOTE — PROGRESS NOTES
Doing well.  No cramping or spotting.  Baby active.  Discussed upcoming anatomy screen.  Diet and exercise reviewed and questions answered. Return in 4 weeks.

## 2021-02-09 NOTE — PATIENT INSTRUCTIONS
Patient Education     Adapting to Pregnancy: Second Trimester    Keep up the healthy habits you started in your first trimester. You might be a little more tired than normal. So plan your day wisely. Look at the tips below and choose the ones that suit your lifestyle.  If you have any questions, check with your healthcare provider.  If you work  If you can, adjust your work with your employer to fit your needs. Try these tips:    If you stand for long periods, find ways to do some tasks while sitting. Also, try to stand with 1 foot resting on a low stool or ledge. Shift your weight from foot to foot often. Wear low-heeled shoes.    If you sit, keep your knees level with your hips. Rest your feet on a firm surface. Sit tall with support for your low back.    If you work long hours, ask about adjusting your schedule. Try taking shorter breaks more often.  When you travel  The second trimester may be the best time for any travel. Talk to your healthcare provider about any special plans you may need to make. Always:    Wear a seat belt. Fasten the lap part under your belly. Wear the shoulder part also.    Take breaks often during long trips by car or plane. Move around to stretch your legs.    Drink plenty of fluids on flights. The air in plane cabins is very dry.    Avoid hot climates or high altitudes if you are not used to them.    Avoid places where the food and water might make you sick.    Make sure you are up-to-date on all immunizations, including the flu vaccine. This is especially important when traveling overseas.  Taking time to relax  Find time to rest and relax at work or at home:    Take short time-outs daily. Do relaxation exercises.    Breathe deeply during stressful times.    Try not to take on too much. Plan tasks for times when you have the most energy.    Take naps when you can. Or just sit and relax.    After week 16, avoid lying on your back for more than a few minutes. Instead, lie on your  side. Switch sides often.  Continuing as lovers  Unless your healthcare provider tells you otherwise, there is no reason to stop having sex now. Blood supply increases to the pelvic area in the second trimester. Because of this, sex might be more enjoyable. Try different positions and see what s best. Also, talk to your partner about any changes in desire. Spotting may happen after sex. Be sure to let your healthcare provider know if there is heavy bleeding.  Keeping your environment safe  You can still clean house and use scented products. Just take some simple precautions:    Wear gloves when using cleaning fluids.    Open windows to let in fresh air. Use a fan if you paint.    Avoid secondhand smoke.    Don t breathe fumes from nail polish, hair spray, cleansers, or other chemicals.  [employer] last reviewed this educational content on 1/1/2018 2000-2020 [employer]. [address] [place] [address]. All rights reserved. This information is not intended as a substitute for professional medical care. Always follow your healthcare professional's instructions.           Patient Education     Pregnancy: Your Second Trimester Changes  Each day, you and your baby are changing and growing together. Here s a quick look at what s happening to both of you.  How you are changing  Even when you don t notice it, your body is adapting to meet the needs of your growing baby. The changes in your body might also affect your moods.  Your body  Your uterus expands as baby grows. As the weeks go by, you will feel more pressure on your bladder, stomach, and other organs. You may notice some skin color changes on your forehead, nose, or cheeks. Freckles may darken, and moles may grow. You may notice a darker line on your abdomen between your belly button and pubic bone in the midline.  Your moods  The second trimester is often easier than the first. Still, be prepared for mood swings. These are due to the  increase in hormones (chemicals that affect the way organs work) produced by your body. These mood swings are a normal part of pregnancy.  How your baby is growing          Month 4  Baby s heartbeat may be heard with a Doppler (hand-held ultrasound device) by 9 to 10 weeks. Eyebrows, eyelashes, and fingernails begin to form.  Month 5  You may feel your baby move. After a growth spurt, your baby nears 10 inches. Month 6  Baby s fingerprints have formed. Your baby weighs about 1 to 2 pounds (0.45 to 0.9 kg) and is about 12 inches long.   [employer] last reviewed this educational content on 1/1/2018 2000-2020 The [employer]. [address] [place] [address], [place] [address]. All rights reserved. This information is not intended as a substitute for professional medical care. Always follow your healthcare professional's instructions.           Patient Education     Understanding Round Ligament Pain in Pregnancy   Round ligament pain is a common problem in pregnancy. Ligaments are strong tissues that connect bones, muscles, and organs. There are 2 round ligaments. There is 1 on each side of the uterus. The top part of each ligament attaches to the upper side of the uterus. The bottom of each ligament attaches down in the pubic area. These ligaments help keep the uterus in place as you move around.     What causes round ligament pain in pregnancy?   As your uterus grows during pregnancy, the round ligaments are stretched and work harder when you move around. They may stretch too quickly when you stand up or bend or laugh. Nearby nerves may be irritated, or the ligaments may have a painful spasm.   Symptoms of ligament pain in pregnancy  The symptoms are sharp pains that last a few seconds. The pain may happen most often on the right side of the belly. It may happen in the hip, the lower belly, or even deep down in your pubic area. The pain may happen when you:     Move suddenly    Stand up    Walk    Roll over in  bed    Laugh    Cough    Sneeze  Diagnosing round ligament pain in pregnancy   Your healthcare provider will ask about your symptoms and give you a physical exam. He or she may give you tests to check for other problems that can cause pain, such as an ovarian cyst or enlarged vein (varicocele). He or she will also check for signs of  labor or other pregnancy problems.   Treatment for round ligament pain in pregnancy   To help prevent pain:    Move slowly when you stand up, roll over, turn, or bend.    Don t stand for long periods of time.    Don t lift heavy objects.    Do gentle daily stretches of your hip joints.  When to call your healthcare provider  Call your healthcare provider right away if you have any of these:     Fever of 100.4 F (38 C) or higher    Pains that last more than a few minutes    Pain that gets worse    Bleeding, nausea, vomiting, or other new symptoms  [employer] last reviewed this educational content on 2020-2020 [employer]. [address] [place] [address], [place] [address]. All rights reserved. This information is not intended as a substitute for professional medical care. Always follow your healthcare professional's instructions.

## 2021-02-10 LAB
BACTERIA SPEC CULT: NORMAL
SPECIMEN SOURCE: NORMAL

## 2021-02-22 ENCOUNTER — HOSPITAL ENCOUNTER (OUTPATIENT)
Dept: ULTRASOUND IMAGING | Facility: HOSPITAL | Age: 31
Discharge: HOME | End: 2021-02-22
Attending: NURSE PRACTITIONER | Admitting: NURSE PRACTITIONER
Payer: COMMERCIAL

## 2021-02-22 DIAGNOSIS — Z34.82 ENCOUNTER FOR SUPERVISION OF OTHER NORMAL PREGNANCY IN SECOND TRIMESTER: ICD-10-CM

## 2021-02-22 PROCEDURE — 76805 OB US >/= 14 WKS SNGL FETUS: CPT

## 2021-03-09 ENCOUNTER — PRENATAL OFFICE VISIT (OUTPATIENT)
Dept: OBGYN | Facility: OTHER | Age: 31
End: 2021-03-09
Attending: NURSE PRACTITIONER
Payer: COMMERCIAL

## 2021-03-09 VITALS
HEART RATE: 70 BPM | SYSTOLIC BLOOD PRESSURE: 110 MMHG | OXYGEN SATURATION: 98 % | BODY MASS INDEX: 33.15 KG/M2 | DIASTOLIC BLOOD PRESSURE: 64 MMHG | HEIGHT: 65 IN | WEIGHT: 199 LBS

## 2021-03-09 DIAGNOSIS — Z34.82 NORMAL PREGNANCY IN MULTIGRAVIDA IN SECOND TRIMESTER: Primary | ICD-10-CM

## 2021-03-09 PROCEDURE — 99207 PR PRENATAL VISIT: CPT | Performed by: NURSE PRACTITIONER

## 2021-03-09 ASSESSMENT — PAIN SCALES - GENERAL: PAINLEVEL: NO PAIN (0)

## 2021-03-09 ASSESSMENT — MIFFLIN-ST. JEOR: SCORE: 1623.54

## 2021-03-09 NOTE — NURSING NOTE
"Chief Complaint   Patient presents with     Prenatal Care     22 weeks 5 days       Initial /64 (BP Location: Right arm, Patient Position: Sitting, Cuff Size: Adult Regular)   Pulse 70   Ht 1.651 m (5' 5\")   Wt 90.3 kg (199 lb)   LMP 09/30/2020 (Approximate)   SpO2 98%   BMI 33.12 kg/m   Estimated body mass index is 33.12 kg/m  as calculated from the following:    Height as of this encounter: 1.651 m (5' 5\").    Weight as of this encounter: 90.3 kg (199 lb).  Medication Reconciliation: complete     Estefania Busby LPN    "

## 2021-03-09 NOTE — PROGRESS NOTES
Doing well.  Denies concerns. Baby active.  US reviewed.  Boy.  Reviewed mid pregnancy changes and comfort measures.  Discussed and ordered 28 week labs for next visit.  Rh negative. Return in 4 weeks.

## 2021-03-09 NOTE — PATIENT INSTRUCTIONS
Patient Education     Pregnancy: Your Second Trimester Changes  Each day, you and your baby are changing and growing together. Here s a quick look at what s happening to both of you.  How you are changing  Even when you don t notice it, your body is adapting to meet the needs of your growing baby. The changes in your body might also affect your moods.  Your body  Your uterus expands as baby grows. As the weeks go by, you will feel more pressure on your bladder, stomach, and other organs. You may notice some skin color changes on your forehead, nose, or cheeks. Freckles may darken, and moles may grow. You may notice a darker line on your abdomen between your belly button and pubic bone in the midline.  Your moods  The second trimester is often easier than the first. Still, be prepared for mood swings. These are due to the increase in hormones (chemicals that affect the way organs work) produced by your body. These mood swings are a normal part of pregnancy.  How your baby is growing          Month 4  Baby s heartbeat may be heard with a Doppler (hand-held ultrasound device) by 9 to 10 weeks. Eyebrows, eyelashes, and fingernails begin to form.  Month 5  You may feel your baby move. After a growth spurt, your baby nears 10 inches. Month 6  Baby s fingerprints have formed. Your baby weighs about 1 to 2 pounds (0.45 to 0.9 kg) and is about 12 inches long.   [employer] last reviewed this educational content on 1/1/2018 2000-2020 [employer]. All rights reserved. This information is not intended as a substitute for professional medical care. Always follow your healthcare professional's instructions.           Patient Education     If You Are Rh Negative     A Rho(D) immune globulin injection protects against Rh disease in this and future pregnancies.   If you re Rh negative, ask your healthcare provider about getting treated with medicine. Even if you miscarry or don t deliver the baby, you'll still need  treatment. The health of any baby you have in the future depends on it.   When are you treated?  If your blood has not formed Rh antibodies, you ll be treated during week 28 of your pregnancy. You also may be treated any time there s a chance that your baby's blood has mixed with yours. This might be after a prenatal test called amniocentesis. Or it might be if you have vaginal bleeding before 28 weeks. Treatment is a shot of medicine (called Rho(D) immune globulin). This medicine stops Rh antibodies from forming. It won t harm you or your baby. After you give birth, your baby s blood will be tested. If it s Rh positive, you ll be given the medicine again in 3 days. If it s Rh negative, you won t need the medicine until your next pregnancy.   Preventing future problems  Your chance of forming Rh antibodies grows with each pregnancy. This is true even for an ectopic pregnancy (the fertilized egg is outside the uterus). It's also true for pregnancies that end in miscarriage or . In these cases, you will most likely get the Rho(D) immune globulin medicine. This is because your body can make Rh antibodies even if you don t deliver a baby. Rh antibodies can cause problems in future pregnancies.      If antibodies have already formed (sensitization), Rho(D) immune globulin can t protect the baby. You and your baby will need special care during pregnancy. Your healthcare provider will explain the details to you.   [employer] last reviewed this educational content on 2020-2020 [employer]. All rights reserved. This information is not intended as a substitute for professional medical care. Always follow your healthcare professional's instructions.

## 2021-04-06 ENCOUNTER — PRENATAL OFFICE VISIT (OUTPATIENT)
Dept: OBGYN | Facility: OTHER | Age: 31
End: 2021-04-06
Attending: NURSE PRACTITIONER
Payer: COMMERCIAL

## 2021-04-06 VITALS
WEIGHT: 202 LBS | HEIGHT: 65 IN | BODY MASS INDEX: 33.66 KG/M2 | DIASTOLIC BLOOD PRESSURE: 70 MMHG | SYSTOLIC BLOOD PRESSURE: 110 MMHG

## 2021-04-06 DIAGNOSIS — Z34.82 NORMAL PREGNANCY IN MULTIGRAVIDA IN SECOND TRIMESTER: Primary | ICD-10-CM

## 2021-04-06 DIAGNOSIS — Z34.82 NORMAL PREGNANCY IN MULTIGRAVIDA IN SECOND TRIMESTER: ICD-10-CM

## 2021-04-06 DIAGNOSIS — Z67.91 RH NEGATIVE STATE IN ANTEPARTUM PERIOD: ICD-10-CM

## 2021-04-06 DIAGNOSIS — O26.899 RH NEGATIVE STATE IN ANTEPARTUM PERIOD: ICD-10-CM

## 2021-04-06 LAB
BLD GP AB SCN SERPL QL: NORMAL
ERYTHROCYTE [DISTWIDTH] IN BLOOD BY AUTOMATED COUNT: 13.6 % (ref 10–15)
GLUCOSE 1H P 50 G GLC PO SERPL-MCNC: 133 MG/DL (ref 60–129)
HCT VFR BLD AUTO: 35.4 % (ref 35–47)
HGB BLD-MCNC: 11.5 G/DL (ref 11.7–15.7)
MCH RBC QN AUTO: 29.7 PG (ref 26.5–33)
MCHC RBC AUTO-ENTMCNC: 32.5 G/DL (ref 31.5–36.5)
MCV RBC AUTO: 92 FL (ref 78–100)
PLATELET # BLD AUTO: 226 10E9/L (ref 150–450)
RBC # BLD AUTO: 3.87 10E12/L (ref 3.8–5.2)
WBC # BLD AUTO: 9.2 10E9/L (ref 4–11)

## 2021-04-06 PROCEDURE — 36415 COLL VENOUS BLD VENIPUNCTURE: CPT | Performed by: NURSE PRACTITIONER

## 2021-04-06 PROCEDURE — 96372 THER/PROPH/DIAG INJ SC/IM: CPT | Performed by: NURSE PRACTITIONER

## 2021-04-06 PROCEDURE — 86850 RBC ANTIBODY SCREEN: CPT | Performed by: NURSE PRACTITIONER

## 2021-04-06 PROCEDURE — 86780 TREPONEMA PALLIDUM: CPT | Mod: 90 | Performed by: NURSE PRACTITIONER

## 2021-04-06 PROCEDURE — 99207 PR PRENATAL VISIT: CPT | Performed by: NURSE PRACTITIONER

## 2021-04-06 PROCEDURE — 82950 GLUCOSE TEST: CPT | Performed by: NURSE PRACTITIONER

## 2021-04-06 PROCEDURE — 85027 COMPLETE CBC AUTOMATED: CPT | Performed by: NURSE PRACTITIONER

## 2021-04-06 ASSESSMENT — MIFFLIN-ST. JEOR: SCORE: 1637.15

## 2021-04-06 ASSESSMENT — PAIN SCALES - GENERAL: PAINLEVEL: NO PAIN (0)

## 2021-04-06 NOTE — PROGRESS NOTES
Doing well.  Baby active.  No cramping or spotting.  28 week labs and rhogam today.  Plan Tdap next visit.  Questions answered regarding covid vaccine - MFM information provided.  Third trimester changes and comfort measures discussed.  Return in 2 weeks.

## 2021-04-06 NOTE — PROGRESS NOTES
Clinic Administered Medication Documentation      Injectable Medication Documentation    Patient was given Rhogam. Prior to medication administration, verified patients identity using patient s name and date of birth. Please see MAR and medication order for additional information.       Was entire vial of medication used? Yes  Vial/Syringe: Single dose vial  Expiration Date:  3/21/2022  Was this medication supplied by the patient? No      Right Glut    Estefania Busby LPN

## 2021-04-06 NOTE — PATIENT INSTRUCTIONS
Patient Education     If You Are Rh Negative     A Rho(D) immune globulin injection protects against Rh disease in this and future pregnancies.   If you re Rh negative, ask your healthcare provider about getting treated with medicine. Even if you miscarry or don t deliver the baby, you'll still need treatment. The health of any baby you have in the future depends on it.   When are you treated?  If your blood has not formed Rh antibodies, you ll be treated during week 28 of your pregnancy. You also may be treated any time there s a chance that your baby's blood has mixed with yours. This might be after a prenatal test called amniocentesis. Or it might be if you have vaginal bleeding before 28 weeks. Treatment is a shot of medicine (called Rho(D) immune globulin). This medicine stops Rh antibodies from forming. It won t harm you or your baby. After you give birth, your baby s blood will be tested. If it s Rh positive, you ll be given the medicine again in 3 days. If it s Rh negative, you won t need the medicine until your next pregnancy.   Preventing future problems  Your chance of forming Rh antibodies grows with each pregnancy. This is true even for an ectopic pregnancy (the fertilized egg is outside the uterus). It's also true for pregnancies that end in miscarriage or . In these cases, you will most likely get the Rho(D) immune globulin medicine. This is because your body can make Rh antibodies even if you don t deliver a baby. Rh antibodies can cause problems in future pregnancies.      If antibodies have already formed (sensitization), Rho(D) immune globulin can t protect the baby. You and your baby will need special care during pregnancy. Your healthcare provider will explain the details to you.   [employer] last reviewed this educational content on 2020-2020 [employer]. All rights reserved. This information is not intended as a substitute for professional medical care. Always  follow your healthcare professional's instructions.

## 2021-04-07 LAB — T PALLIDUM AB SER QL: NONREACTIVE

## 2021-04-09 ENCOUNTER — IMMUNIZATION (OUTPATIENT)
Dept: FAMILY MEDICINE | Facility: OTHER | Age: 31
End: 2021-04-09
Attending: FAMILY MEDICINE
Payer: COMMERCIAL

## 2021-04-09 PROCEDURE — 0031A PR COVID VAC JANSSEN AD26 0.5ML: CPT

## 2021-04-09 PROCEDURE — 91303 PR COVID VAC JANSSEN AD26 0.5ML: CPT

## 2021-04-18 ENCOUNTER — HEALTH MAINTENANCE LETTER (OUTPATIENT)
Age: 31
End: 2021-04-18

## 2021-04-20 ENCOUNTER — PRENATAL OFFICE VISIT (OUTPATIENT)
Dept: OBGYN | Facility: OTHER | Age: 31
End: 2021-04-20
Attending: OBSTETRICS & GYNECOLOGY
Payer: COMMERCIAL

## 2021-04-20 VITALS
BODY MASS INDEX: 34.1 KG/M2 | DIASTOLIC BLOOD PRESSURE: 73 MMHG | SYSTOLIC BLOOD PRESSURE: 122 MMHG | HEART RATE: 76 BPM | HEIGHT: 65 IN | OXYGEN SATURATION: 96 % | WEIGHT: 204.7 LBS

## 2021-04-20 DIAGNOSIS — Z34.80 PREGNANCY IN MULTIGRAVIDA: ICD-10-CM

## 2021-04-20 DIAGNOSIS — Z23 NEED FOR VACCINATION: Primary | ICD-10-CM

## 2021-04-20 PROCEDURE — 99207 PR PRENATAL VISIT: CPT | Performed by: OBSTETRICS & GYNECOLOGY

## 2021-04-20 PROCEDURE — 90471 IMMUNIZATION ADMIN: CPT | Performed by: OBSTETRICS & GYNECOLOGY

## 2021-04-20 PROCEDURE — 90715 TDAP VACCINE 7 YRS/> IM: CPT | Performed by: OBSTETRICS & GYNECOLOGY

## 2021-04-20 ASSESSMENT — MIFFLIN-ST. JEOR: SCORE: 1649.39

## 2021-04-20 ASSESSMENT — PAIN SCALES - GENERAL: PAINLEVEL: NO PAIN (0)

## 2021-04-20 NOTE — NURSING NOTE
"Chief Complaint   Patient presents with     Prenatal Care     28 weeks 5 days       Initial /73 (BP Location: Left arm, Cuff Size: Adult Regular)   Pulse 76   Ht 1.651 m (5' 5\")   Wt 92.9 kg (204 lb 11.2 oz)   LMP 09/30/2020 (Approximate)   SpO2 96%   BMI 34.06 kg/m   Estimated body mass index is 34.06 kg/m  as calculated from the following:    Height as of this encounter: 1.651 m (5' 5\").    Weight as of this encounter: 92.9 kg (204 lb 11.2 oz).  Medication Reconciliation: complete  Rain Salazar LPN  "

## 2021-04-20 NOTE — PROGRESS NOTES
Doing well.  No concerns today.  Discussed kick counts and fetal movement.  TDAP done  RTC in 2 weeks  Denies PTL sx, jeremy, aiden Sanchez MD  4/20/2021

## 2021-05-11 ENCOUNTER — PRENATAL OFFICE VISIT (OUTPATIENT)
Dept: OBGYN | Facility: OTHER | Age: 31
End: 2021-05-11
Attending: NURSE PRACTITIONER
Payer: COMMERCIAL

## 2021-05-11 VITALS
BODY MASS INDEX: 33.42 KG/M2 | DIASTOLIC BLOOD PRESSURE: 72 MMHG | HEART RATE: 86 BPM | HEIGHT: 65 IN | SYSTOLIC BLOOD PRESSURE: 121 MMHG | WEIGHT: 200.6 LBS | OXYGEN SATURATION: 100 %

## 2021-05-11 DIAGNOSIS — Z34.80 PREGNANCY IN MULTIGRAVIDA: Primary | ICD-10-CM

## 2021-05-11 PROCEDURE — 99207 PR PRENATAL VISIT: CPT | Performed by: NURSE PRACTITIONER

## 2021-05-11 ASSESSMENT — MIFFLIN-ST. JEOR: SCORE: 1630.8

## 2021-05-11 ASSESSMENT — PAIN SCALES - GENERAL: PAINLEVEL: NO PAIN (0)

## 2021-05-11 NOTE — NURSING NOTE
"Chief Complaint   Patient presents with     Prenatal Care     31 weeks 5 days       Initial /72   Pulse 86   Ht 1.651 m (5' 5\")   Wt 91 kg (200 lb 9.6 oz)   LMP 09/30/2020 (Approximate)   SpO2 100%   BMI 33.38 kg/m   Estimated body mass index is 33.38 kg/m  as calculated from the following:    Height as of this encounter: 1.651 m (5' 5\").    Weight as of this encounter: 91 kg (200 lb 9.6 oz).  Medication Reconciliation: complete     Estefania Busby, LUNAN    "

## 2021-05-11 NOTE — PROGRESS NOTES
Doing well.  No concerns.  Baby active.  Leaving Monday, May 17 for [place] vacation and family visit.  Denies concerns.  No cramping or jenniffer.  Discussed s/sx PTL, decreased fetal movement etc.  Will carry a copy of prenatal.  Return in 2-3 weeks.

## 2021-06-03 ENCOUNTER — PRENATAL OFFICE VISIT (OUTPATIENT)
Dept: OBGYN | Facility: OTHER | Age: 31
End: 2021-06-03
Attending: NURSE PRACTITIONER
Payer: COMMERCIAL

## 2021-06-03 VITALS
DIASTOLIC BLOOD PRESSURE: 68 MMHG | HEIGHT: 65 IN | BODY MASS INDEX: 33.89 KG/M2 | OXYGEN SATURATION: 98 % | WEIGHT: 203.4 LBS | HEART RATE: 87 BPM | SYSTOLIC BLOOD PRESSURE: 112 MMHG

## 2021-06-03 DIAGNOSIS — Z34.83 NORMAL PREGNANCY IN MULTIGRAVIDA IN THIRD TRIMESTER: Primary | ICD-10-CM

## 2021-06-03 PROCEDURE — 99207 PR PRENATAL VISIT: CPT | Performed by: NURSE PRACTITIONER

## 2021-06-03 ASSESSMENT — MIFFLIN-ST. JEOR: SCORE: 1643.5

## 2021-06-03 ASSESSMENT — PAIN SCALES - GENERAL: PAINLEVEL: NO PAIN (0)

## 2021-06-03 NOTE — PROGRESS NOTES
Doing well.  Denies concerns.  Baby active.  No cramping, jenniffer, leaking , or bleeding. Plan GBS next visit. Return in 1 week.   You can access the FollowMyHealth Patient Portal offered by [facility] by registering at the following website: [email or web address]. By joining [employer]’s FollowMyHealth portal, you will also be able to view your health information using other applications (apps) compatible with our system.

## 2021-06-03 NOTE — NURSING NOTE
"Chief Complaint   Patient presents with     Prenatal Care     35 weeks 0 days       Initial /68 (BP Location: Right arm, Patient Position: Sitting, Cuff Size: Adult Regular)   Pulse 87   Ht 1.651 m (5' 5\")   Wt 92.3 kg (203 lb 6.4 oz)   LMP 09/30/2020 (Approximate)   SpO2 98%   BMI 33.85 kg/m   Estimated body mass index is 33.85 kg/m  as calculated from the following:    Height as of this encounter: 1.651 m (5' 5\").    Weight as of this encounter: 92.3 kg (203 lb 6.4 oz).  Medication Reconciliation: complete     Estefania Busby LPN    "

## 2021-06-03 NOTE — PATIENT INSTRUCTIONS
Patient Education     Understanding Round Ligament Pain in Pregnancy   Round ligament pain is a common problem in pregnancy. Ligaments are strong tissues that connect bones, muscles, and organs. There are 2 round ligaments. There is 1 on each side of the uterus. The top part of each ligament attaches to the upper side of the uterus. The bottom of each ligament attaches down in the pubic area. These ligaments help keep the uterus in place as you move around.     What causes round ligament pain in pregnancy?   As your uterus grows during pregnancy, the round ligaments are stretched and work harder when you move around. They may stretch too quickly when you stand up or bend or laugh. Nearby nerves may be irritated, or the ligaments may have a painful spasm.   Symptoms of ligament pain in pregnancy  The symptoms are sharp pains that last a few seconds. The pain may happen most often on the right side of the belly. It may happen in the hip, the lower belly, or even deep down in your pubic area. The pain may happen when you:     Move suddenly    Stand up    Walk    Roll over in bed    Laugh    Cough    Sneeze  Diagnosing round ligament pain in pregnancy   Your healthcare provider will ask about your symptoms and give you a physical exam. He or she may give you tests to check for other problems that can cause pain, such as an ovarian cyst or enlarged vein (varicocele). He or she will also check for signs of  labor or other pregnancy problems.   Treatment for round ligament pain in pregnancy   To help prevent pain:    Move slowly when you stand up, roll over, turn, or bend.    Don t stand for long periods of time.    Don t lift heavy objects.    Do gentle daily stretches of your hip joints.  When to call your healthcare provider  Call your healthcare provider right away if you have any of these:     Fever of 100.4 F (38 C) or higher    Pains that last more than a few minutes    Pain that gets worse    Bleeding,  nausea, vomiting, or other new symptoms  [employer] last reviewed this educational content on 1/1/2020 2000-2021 [employer]. All rights reserved. This information is not intended as a substitute for professional medical care. Always follow your healthcare professional's instructions.

## 2021-06-08 ENCOUNTER — PRENATAL OFFICE VISIT (OUTPATIENT)
Dept: OBGYN | Facility: OTHER | Age: 31
End: 2021-06-08
Attending: OBSTETRICS & GYNECOLOGY
Payer: COMMERCIAL

## 2021-06-08 VITALS — WEIGHT: 203 LBS | BODY MASS INDEX: 33.78 KG/M2 | DIASTOLIC BLOOD PRESSURE: 72 MMHG | SYSTOLIC BLOOD PRESSURE: 110 MMHG

## 2021-06-08 DIAGNOSIS — Z34.82 NORMAL PREGNANCY IN MULTIGRAVIDA IN SECOND TRIMESTER: Primary | ICD-10-CM

## 2021-06-08 PROCEDURE — 99207 PR PRENATAL VISIT: CPT | Performed by: OBSTETRICS & GYNECOLOGY

## 2021-06-08 PROCEDURE — 87653 STREP B DNA AMP PROBE: CPT | Performed by: OBSTETRICS & GYNECOLOGY

## 2021-06-08 ASSESSMENT — PAIN SCALES - GENERAL: PAINLEVEL: NO PAIN (0)

## 2021-06-08 NOTE — PROGRESS NOTES
SUBJECTIVE  Fawn Davis is a 30 year old  with Patient's last menstrual period was 2020 (approximate). MAURA is Estimated Date of Delivery: 2021. Gestational age is 35w5d. She presents for follow up OB visit.    She is doing well. She feels good fetal movement. She denies contractions. She denies leaking of fluid or vaginal bleeding. She denies abnormal vaginal discharge, difficulty urinating, fever or chills. No headache, vision changes, lower extremity swelling, upper abdominal pain, chest pain, or shortness of breath.  She denies depression symptoms.    OBJECTIVE  /72   Wt 92.1 kg (203 lb)   LMP 2020 (Approximate)   BMI 33.78 kg/m      General:  Well-developed well-nourished gravid female in no apparent distress. Alert and oriented x3.  Abdomen: Soft, gravid, non tender, positive bowel sounds. Fundal height at 36 cm. Fetal heart tones auscultated at 140.  Presentation noted to be cephalic by Leopold.  Cervix: deferred  Extremities:  No clubbing, cyanosis, or edema. Nontender bilaterally.    ASSESSMENT / PLAN  1 Intrauterine pregnancy at 35w5d.  2 Rh negative    - Problem list reviewed and updated.  - Pregnancy weight gain has been 1.361 kg (3 lb) to date, which is adequate.  - GBS culture obtained today.  - Plan cervix check at next visit.  - I reviewed routine obstetrical precautions, recommendations and instructions with the patient including fetal movement and kick count instructions.  - I reviewed depression precautions.  - I reviewed pre-eclampsia precautions with the patient with instructions for the patient to come in for persistent headache unrelieved with Tylenol, blurry vision, right upper quadrant pain, shortness of breath, or significant persistent edema.  - I reviewed  labor precautions with the patient with instructions for the patient to come in for decreased fetal movement, suspected rupture of membranes, regular contraction pattern, vaginal bleeding or  any other concerning symptoms.  - Follow up in 1 week.    Manjeet Esteban MD  Obstetrics and Gynecology

## 2021-06-09 LAB
GP B STREP DNA SPEC QL NAA+PROBE: NEGATIVE
SPECIMEN SOURCE: NORMAL

## 2021-06-15 ENCOUNTER — PRENATAL OFFICE VISIT (OUTPATIENT)
Dept: OBGYN | Facility: OTHER | Age: 31
End: 2021-06-15
Attending: OBSTETRICS & GYNECOLOGY
Payer: COMMERCIAL

## 2021-06-15 VITALS
WEIGHT: 205 LBS | DIASTOLIC BLOOD PRESSURE: 81 MMHG | SYSTOLIC BLOOD PRESSURE: 120 MMHG | OXYGEN SATURATION: 100 % | BODY MASS INDEX: 34.16 KG/M2 | HEART RATE: 105 BPM | HEIGHT: 65 IN

## 2021-06-15 DIAGNOSIS — Z34.83 NORMAL PREGNANCY IN MULTIGRAVIDA IN THIRD TRIMESTER: Primary | ICD-10-CM

## 2021-06-15 PROCEDURE — 99207 PR PRENATAL VISIT: CPT | Performed by: OBSTETRICS & GYNECOLOGY

## 2021-06-15 ASSESSMENT — PAIN SCALES - GENERAL: PAINLEVEL: NO PAIN (0)

## 2021-06-15 ASSESSMENT — MIFFLIN-ST. JEOR: SCORE: 1650.75

## 2021-06-15 NOTE — PROGRESS NOTES
Doing well.  No concerns today.  Discussed signs of labor and when to call or come in.  Discussed kick counts and fetal movement.  RTC in 1 week  Denies regular contractions, vaginal bleeding, MARIBELL Sanchez MD  6/15/2021

## 2021-06-15 NOTE — NURSING NOTE
"Chief Complaint   Patient presents with     Prenatal Care     36w 5d       Initial /81 (BP Location: Left arm, Cuff Size: Adult Regular)   Pulse 105   Ht 1.651 m (5' 5\")   Wt 93 kg (205 lb)   LMP 09/30/2020 (Approximate)   SpO2 100%   BMI 34.11 kg/m   Estimated body mass index is 34.11 kg/m  as calculated from the following:    Height as of this encounter: 1.651 m (5' 5\").    Weight as of this encounter: 93 kg (205 lb).  Medication Reconciliation: complete  Rain Salazar LPN  "

## 2021-06-16 ENCOUNTER — MYC MEDICAL ADVICE (OUTPATIENT)
Dept: OBGYN | Facility: OTHER | Age: 31
End: 2021-06-16

## 2021-06-22 ENCOUNTER — PRENATAL OFFICE VISIT (OUTPATIENT)
Dept: OBGYN | Facility: OTHER | Age: 31
End: 2021-06-22
Attending: OBSTETRICS & GYNECOLOGY
Payer: COMMERCIAL

## 2021-06-22 VITALS
HEART RATE: 65 BPM | HEIGHT: 65 IN | OXYGEN SATURATION: 98 % | DIASTOLIC BLOOD PRESSURE: 73 MMHG | SYSTOLIC BLOOD PRESSURE: 110 MMHG | WEIGHT: 203 LBS | BODY MASS INDEX: 33.82 KG/M2

## 2021-06-22 DIAGNOSIS — Z34.83 ENCOUNTER FOR SUPERVISION OF NORMAL PREGNANCY IN MULTIGRAVIDA IN THIRD TRIMESTER: Primary | ICD-10-CM

## 2021-06-22 PROCEDURE — 99207 PR PRENATAL VISIT: CPT | Performed by: OBSTETRICS & GYNECOLOGY

## 2021-06-22 ASSESSMENT — PAIN SCALES - GENERAL: PAINLEVEL: NO PAIN (0)

## 2021-06-22 ASSESSMENT — MIFFLIN-ST. JEOR: SCORE: 1641.68

## 2021-06-22 NOTE — NURSING NOTE
"Chief Complaint   Patient presents with     Prenatal Care     37w 5d       Initial /73 (BP Location: Left arm, Cuff Size: Adult Regular)   Pulse 65   Ht 1.651 m (5' 5\")   Wt 92.1 kg (203 lb)   LMP 09/30/2020 (Approximate)   SpO2 98%   BMI 33.78 kg/m   Estimated body mass index is 33.78 kg/m  as calculated from the following:    Height as of this encounter: 1.651 m (5' 5\").    Weight as of this encounter: 92.1 kg (203 lb).  Medication Reconciliation: complete  Rain Salazar LPN  "

## 2021-06-22 NOTE — PROGRESS NOTES
Doing well.  No concerns today.  Discussed signs of labor and when to call or come in.  RTC in 1 week  Denies regular contractions, vaginal bleeding, MARIBELL Sanchez MD  6/22/2021

## 2021-06-29 ENCOUNTER — PRENATAL OFFICE VISIT (OUTPATIENT)
Dept: OBGYN | Facility: OTHER | Age: 31
End: 2021-06-29
Attending: OBSTETRICS & GYNECOLOGY
Payer: COMMERCIAL

## 2021-06-29 VITALS
SYSTOLIC BLOOD PRESSURE: 120 MMHG | HEART RATE: 74 BPM | WEIGHT: 206 LBS | TEMPERATURE: 98.1 F | OXYGEN SATURATION: 100 % | DIASTOLIC BLOOD PRESSURE: 80 MMHG | BODY MASS INDEX: 34.28 KG/M2

## 2021-06-29 DIAGNOSIS — Z34.83 ENCOUNTER FOR SUPERVISION OF NORMAL PREGNANCY IN MULTIGRAVIDA IN THIRD TRIMESTER: Primary | ICD-10-CM

## 2021-06-29 PROCEDURE — 99207 PR PRENATAL VISIT: CPT | Performed by: OBSTETRICS & GYNECOLOGY

## 2021-06-29 ASSESSMENT — PAIN SCALES - GENERAL: PAINLEVEL: NO PAIN (0)

## 2021-06-29 NOTE — PROGRESS NOTES
Doing well.  No concerns today.  Discussed signs of labor and when to call or come in.  Discussed kick counts and fetal movement.  RTC in 1 week  Denies regular contractions, vaginal bleeding, MARIBELL Sanchez MD  6/29/2021

## 2021-07-02 ENCOUNTER — HOSPITAL ENCOUNTER (INPATIENT)
Facility: HOSPITAL | Age: 31
LOS: 2 days | Discharge: HOME | End: 2021-07-04
Attending: OBSTETRICS & GYNECOLOGY | Admitting: OBSTETRICS & GYNECOLOGY
Payer: COMMERCIAL

## 2021-07-02 LAB
ABO + RH BLD: NORMAL
ABO + RH BLD: NORMAL
BASOPHILS # BLD AUTO: 0 10E9/L (ref 0–0.2)
BASOPHILS NFR BLD AUTO: 0.2 %
BLD GP AB SCN SERPL QL: NORMAL
BLOOD BANK CMNT PATIENT-IMP: NORMAL
BLOOD BANK CMNT PATIENT-IMP: NORMAL
DIFFERENTIAL METHOD BLD: ABNORMAL
EOSINOPHIL # BLD AUTO: 0.1 10E9/L (ref 0–0.7)
EOSINOPHIL NFR BLD AUTO: 1.3 %
ERYTHROCYTE [DISTWIDTH] IN BLOOD BY AUTOMATED COUNT: 14 % (ref 10–15)
HCT VFR BLD AUTO: 35.3 % (ref 35–47)
HGB BLD-MCNC: 11.6 G/DL (ref 11.7–15.7)
IMM GRANULOCYTES # BLD: 0 10E9/L (ref 0–0.4)
IMM GRANULOCYTES NFR BLD: 0.4 %
LABORATORY COMMENT REPORT: NORMAL
LYMPHOCYTES # BLD AUTO: 2.1 10E9/L (ref 0.8–5.3)
LYMPHOCYTES NFR BLD AUTO: 22.3 %
MCH RBC QN AUTO: 29.4 PG (ref 26.5–33)
MCHC RBC AUTO-ENTMCNC: 32.9 G/DL (ref 31.5–36.5)
MCV RBC AUTO: 90 FL (ref 78–100)
MONOCYTES # BLD AUTO: 0.5 10E9/L (ref 0–1.3)
MONOCYTES NFR BLD AUTO: 4.9 %
NEUTROPHILS # BLD AUTO: 6.7 10E9/L (ref 1.6–8.3)
NEUTROPHILS NFR BLD AUTO: 70.9 %
NRBC # BLD AUTO: 0 10*3/UL
NRBC BLD AUTO-RTO: 0 /100
PLATELET # BLD AUTO: 217 10E9/L (ref 150–450)
RBC # BLD AUTO: 3.94 10E12/L (ref 3.8–5.2)
SARS-COV-2 RNA RESP QL NAA+PROBE: NEGATIVE
SPECIMEN EXP DATE BLD: NORMAL
SPECIMEN SOURCE: NORMAL
WBC # BLD AUTO: 9.5 10E9/L (ref 4–11)

## 2021-07-02 PROCEDURE — 86850 RBC ANTIBODY SCREEN: CPT | Performed by: OBSTETRICS & GYNECOLOGY

## 2021-07-02 PROCEDURE — 722N000001 HC LABOR CARE VAGINAL DELIVERY SINGLE

## 2021-07-02 PROCEDURE — 0KQM0ZZ REPAIR PERINEUM MUSCLE, OPEN APPROACH: ICD-10-PCS | Performed by: OBSTETRICS & GYNECOLOGY

## 2021-07-02 PROCEDURE — 250N000013 HC RX MED GY IP 250 OP 250 PS 637: Performed by: OBSTETRICS & GYNECOLOGY

## 2021-07-02 PROCEDURE — 85025 COMPLETE CBC W/AUTO DIFF WBC: CPT | Performed by: OBSTETRICS & GYNECOLOGY

## 2021-07-02 PROCEDURE — 86780 TREPONEMA PALLIDUM: CPT | Performed by: OBSTETRICS & GYNECOLOGY

## 2021-07-02 PROCEDURE — U0005 INFEC AGEN DETEC AMPLI PROBE: HCPCS | Performed by: OBSTETRICS & GYNECOLOGY

## 2021-07-02 PROCEDURE — U0003 INFECTIOUS AGENT DETECTION BY NUCLEIC ACID (DNA OR RNA); SEVERE ACUTE RESPIRATORY SYNDROME CORONAVIRUS 2 (SARS-COV-2) (CORONAVIRUS DISEASE [COVID-19]), AMPLIFIED PROBE TECHNIQUE, MAKING USE OF HIGH THROUGHPUT TECHNOLOGIES AS DESCRIBED BY CMS-2020-01-R: HCPCS | Performed by: OBSTETRICS & GYNECOLOGY

## 2021-07-02 PROCEDURE — 86901 BLOOD TYPING SEROLOGIC RH(D): CPT | Performed by: OBSTETRICS & GYNECOLOGY

## 2021-07-02 PROCEDURE — 36415 COLL VENOUS BLD VENIPUNCTURE: CPT | Performed by: OBSTETRICS & GYNECOLOGY

## 2021-07-02 PROCEDURE — 59400 OBSTETRICAL CARE: CPT | Performed by: OBSTETRICS & GYNECOLOGY

## 2021-07-02 PROCEDURE — 120N000001 HC R&B MED SURG/OB

## 2021-07-02 PROCEDURE — 250N000009 HC RX 250: Performed by: OBSTETRICS & GYNECOLOGY

## 2021-07-02 PROCEDURE — 86900 BLOOD TYPING SEROLOGIC ABO: CPT | Performed by: OBSTETRICS & GYNECOLOGY

## 2021-07-02 RX ORDER — NALOXONE HYDROCHLORIDE 0.4 MG/ML
0.2 INJECTION, SOLUTION INTRAMUSCULAR; INTRAVENOUS; SUBCUTANEOUS
Status: DISCONTINUED | OUTPATIENT
Start: 2021-07-02 | End: 2021-07-04 | Stop reason: HOSPADM

## 2021-07-02 RX ORDER — MISOPROSTOL 200 UG/1
400 TABLET ORAL
Status: DISCONTINUED | OUTPATIENT
Start: 2021-07-02 | End: 2021-07-04 | Stop reason: HOSPADM

## 2021-07-02 RX ORDER — METHYLERGONOVINE MALEATE 0.2 MG/ML
200 INJECTION INTRAVENOUS
Status: DISCONTINUED | OUTPATIENT
Start: 2021-07-02 | End: 2021-07-02

## 2021-07-02 RX ORDER — NALOXONE HYDROCHLORIDE 0.4 MG/ML
0.2 INJECTION, SOLUTION INTRAMUSCULAR; INTRAVENOUS; SUBCUTANEOUS
Status: DISCONTINUED | OUTPATIENT
Start: 2021-07-02 | End: 2021-07-02

## 2021-07-02 RX ORDER — TRANEXAMIC ACID 10 MG/ML
1 INJECTION, SOLUTION INTRAVENOUS EVERY 30 MIN PRN
Status: DISCONTINUED | OUTPATIENT
Start: 2021-07-02 | End: 2021-07-02

## 2021-07-02 RX ORDER — OXYTOCIN 10 [USP'U]/ML
10 INJECTION, SOLUTION INTRAMUSCULAR; INTRAVENOUS
Status: DISCONTINUED | OUTPATIENT
Start: 2021-07-02 | End: 2021-07-02

## 2021-07-02 RX ORDER — FENTANYL CITRATE 50 UG/ML
50-100 INJECTION, SOLUTION INTRAMUSCULAR; INTRAVENOUS
Status: DISCONTINUED | OUTPATIENT
Start: 2021-07-02 | End: 2021-07-02

## 2021-07-02 RX ORDER — CARBOPROST TROMETHAMINE 250 UG/ML
250 INJECTION, SOLUTION INTRAMUSCULAR
Status: DISCONTINUED | OUTPATIENT
Start: 2021-07-02 | End: 2021-07-02

## 2021-07-02 RX ORDER — MODIFIED LANOLIN
OINTMENT (GRAM) TOPICAL
Status: DISCONTINUED | OUTPATIENT
Start: 2021-07-02 | End: 2021-07-04 | Stop reason: HOSPADM

## 2021-07-02 RX ORDER — OXYTOCIN/0.9 % SODIUM CHLORIDE 30/500 ML
100 PLASTIC BAG, INJECTION (ML) INTRAVENOUS CONTINUOUS
Status: DISCONTINUED | OUTPATIENT
Start: 2021-07-02 | End: 2021-07-04 | Stop reason: HOSPADM

## 2021-07-02 RX ORDER — BISACODYL 10 MG
10 SUPPOSITORY, RECTAL RECTAL DAILY PRN
Status: DISCONTINUED | OUTPATIENT
Start: 2021-07-04 | End: 2021-07-04 | Stop reason: HOSPADM

## 2021-07-02 RX ORDER — NALOXONE HYDROCHLORIDE 0.4 MG/ML
0.4 INJECTION, SOLUTION INTRAMUSCULAR; INTRAVENOUS; SUBCUTANEOUS
Status: DISCONTINUED | OUTPATIENT
Start: 2021-07-02 | End: 2021-07-04 | Stop reason: HOSPADM

## 2021-07-02 RX ORDER — OXYCODONE AND ACETAMINOPHEN 5; 325 MG/1; MG/1
1 TABLET ORAL
Status: DISCONTINUED | OUTPATIENT
Start: 2021-07-02 | End: 2021-07-02

## 2021-07-02 RX ORDER — LIDOCAINE 40 MG/G
CREAM TOPICAL
Status: DISCONTINUED | OUTPATIENT
Start: 2021-07-02 | End: 2021-07-02

## 2021-07-02 RX ORDER — HYDROCORTISONE 2.5 %
CREAM (GRAM) TOPICAL 3 TIMES DAILY PRN
Status: DISCONTINUED | OUTPATIENT
Start: 2021-07-02 | End: 2021-07-04 | Stop reason: HOSPADM

## 2021-07-02 RX ORDER — OXYTOCIN/0.9 % SODIUM CHLORIDE 30/500 ML
340 PLASTIC BAG, INJECTION (ML) INTRAVENOUS CONTINUOUS PRN
Status: DISCONTINUED | OUTPATIENT
Start: 2021-07-02 | End: 2021-07-04 | Stop reason: HOSPADM

## 2021-07-02 RX ORDER — IBUPROFEN 800 MG/1
800 TABLET, FILM COATED ORAL EVERY 6 HOURS PRN
Status: DISCONTINUED | OUTPATIENT
Start: 2021-07-02 | End: 2021-07-04 | Stop reason: HOSPADM

## 2021-07-02 RX ORDER — ACETAMINOPHEN 325 MG/1
650 TABLET ORAL EVERY 4 HOURS PRN
Status: DISCONTINUED | OUTPATIENT
Start: 2021-07-02 | End: 2021-07-02

## 2021-07-02 RX ORDER — LANOLIN ALCOHOL/MO/W.PET/CERES
800 CREAM (GRAM) TOPICAL DAILY
Status: DISCONTINUED | OUTPATIENT
Start: 2021-07-02 | End: 2021-07-04 | Stop reason: HOSPADM

## 2021-07-02 RX ORDER — OXYTOCIN 10 [USP'U]/ML
10 INJECTION, SOLUTION INTRAMUSCULAR; INTRAVENOUS
Status: DISCONTINUED | OUTPATIENT
Start: 2021-07-02 | End: 2021-07-04 | Stop reason: HOSPADM

## 2021-07-02 RX ORDER — METHYLERGONOVINE MALEATE 0.2 MG/ML
200 INJECTION INTRAVENOUS
Status: DISCONTINUED | OUTPATIENT
Start: 2021-07-02 | End: 2021-07-04 | Stop reason: HOSPADM

## 2021-07-02 RX ORDER — OXYTOCIN/0.9 % SODIUM CHLORIDE 30/500 ML
100-340 PLASTIC BAG, INJECTION (ML) INTRAVENOUS CONTINUOUS PRN
Status: COMPLETED | OUTPATIENT
Start: 2021-07-02 | End: 2021-07-02

## 2021-07-02 RX ORDER — NALOXONE HYDROCHLORIDE 0.4 MG/ML
0.4 INJECTION, SOLUTION INTRAMUSCULAR; INTRAVENOUS; SUBCUTANEOUS
Status: DISCONTINUED | OUTPATIENT
Start: 2021-07-02 | End: 2021-07-02

## 2021-07-02 RX ORDER — TRANEXAMIC ACID 10 MG/ML
1 INJECTION, SOLUTION INTRAVENOUS EVERY 30 MIN PRN
Status: DISCONTINUED | OUTPATIENT
Start: 2021-07-02 | End: 2021-07-04 | Stop reason: HOSPADM

## 2021-07-02 RX ORDER — ACETAMINOPHEN 325 MG/1
650 TABLET ORAL EVERY 4 HOURS PRN
Status: DISCONTINUED | OUTPATIENT
Start: 2021-07-02 | End: 2021-07-04 | Stop reason: HOSPADM

## 2021-07-02 RX ORDER — IBUPROFEN 800 MG/1
800 TABLET, FILM COATED ORAL
Status: DISCONTINUED | OUTPATIENT
Start: 2021-07-02 | End: 2021-07-02

## 2021-07-02 RX ORDER — AMOXICILLIN 250 MG
2 CAPSULE ORAL 2 TIMES DAILY
Status: DISCONTINUED | OUTPATIENT
Start: 2021-07-02 | End: 2021-07-04 | Stop reason: HOSPADM

## 2021-07-02 RX ORDER — OXYCODONE HYDROCHLORIDE 5 MG/1
5 TABLET ORAL EVERY 4 HOURS PRN
Status: DISCONTINUED | OUTPATIENT
Start: 2021-07-02 | End: 2021-07-04 | Stop reason: HOSPADM

## 2021-07-02 RX ORDER — AMOXICILLIN 250 MG
1 CAPSULE ORAL 2 TIMES DAILY
Status: DISCONTINUED | OUTPATIENT
Start: 2021-07-02 | End: 2021-07-04 | Stop reason: HOSPADM

## 2021-07-02 RX ORDER — SODIUM CHLORIDE, SODIUM LACTATE, POTASSIUM CHLORIDE, CALCIUM CHLORIDE 600; 310; 30; 20 MG/100ML; MG/100ML; MG/100ML; MG/100ML
INJECTION, SOLUTION INTRAVENOUS CONTINUOUS
Status: DISCONTINUED | OUTPATIENT
Start: 2021-07-02 | End: 2021-07-02

## 2021-07-02 RX ORDER — ONDANSETRON 2 MG/ML
4 INJECTION INTRAMUSCULAR; INTRAVENOUS EVERY 6 HOURS PRN
Status: DISCONTINUED | OUTPATIENT
Start: 2021-07-02 | End: 2021-07-02

## 2021-07-02 RX ADMIN — SENNOSIDES AND DOCUSATE SODIUM 1 TABLET: 8.6; 5 TABLET ORAL at 09:18

## 2021-07-02 RX ADMIN — Medication 340 ML/HR: at 06:05

## 2021-07-02 RX ADMIN — LIDOCAINE HYDROCHLORIDE 20 ML: 10 INJECTION, SOLUTION INFILTRATION; PERINEURAL at 05:56

## 2021-07-02 RX ADMIN — IBUPROFEN 800 MG: 800 TABLET, FILM COATED ORAL at 20:50

## 2021-07-02 RX ADMIN — IBUPROFEN 800 MG: 800 TABLET, FILM COATED ORAL at 06:23

## 2021-07-02 RX ADMIN — IBUPROFEN 800 MG: 800 TABLET, FILM COATED ORAL at 12:05

## 2021-07-02 RX ADMIN — DOCUSATE SODIUM AND SENNOSIDES 2 TABLET: 8.6; 5 TABLET, FILM COATED ORAL at 20:50

## 2021-07-02 RX ADMIN — Medication 800 MCG: at 09:18

## 2021-07-02 ASSESSMENT — MIFFLIN-ST. JEOR: SCORE: 1655.29

## 2021-07-02 NOTE — PLAN OF CARE
Vaginal Delivery Note   of viable Male.  Nursery RN Varsha Esteban present.  Infant with spontaneous cry, to mother's abdomen, dried and stimulated. Ligia cares provided.  Mother and baby in stable condition. Baby skin-to-skin with mom. ID bands placed on infant, mom and dad.

## 2021-07-02 NOTE — PLAN OF CARE
Face to face report given with opportunity to observe patient.    Report given to Ko Meza RN   7/2/2021  7:21 AM

## 2021-07-02 NOTE — L&D DELIVERY NOTE
Delivery Summary    Fawn Davis MRN# [id number]   Age: 30 year old YOB: 1990     ASSESSMENT & PLAN:    Austyn Sanchez 21  2nd degree lac       Alessandro Davis-Fawn [[id number]]    Labor Event Times    Labor onset date: 21 Onset time: 11:00 PM      Labor Events     labor?: No   steroids: None  Labor Type: Spontaneous  Predominate monitoring during 1st stage: intermittent electronic fetal monitoring     Antibiotics received during labor?: No     Rupture identifier: Sac 1  Rupture date/time: 21 2300   Rupture type: Spontaneous rupture of membranes occuring during spontaneous labor or augmentation  Fluid color: Clear  Fluid odor: Normal     Augmentation: None  1:1 continuous labor support provided by?: RN       Delivery/Placenta Date and Time    Delivery Date: 21 Delivery Time:  5:49 AM           Vaginal Counts     Initial count performed by 2 team members:  Two Team Members   Yoli Esteban RN       Concho Suture Needles Sponges (RETIRED) Instruments   Initial counts 2  15    Added to count  1     Relief counts       Final counts             Placed during labor Accounted for at the end of labor   FSE     IUPC     Cervadil                       Apgars    Living status: Living   1 Minute 5 Minute 10 Minute 15 Minute 20 Minute   Skin color: 1  1       Heart rate: 2  2       Reflex irritability: 2  2       Muscle tone: 2  2       Respiratory effort: 2  2       Total: 9  9       Apgars assigned by: INESSA ANSARI     Cord    Vessels: 3 Vessels                Cord Blood Disposition: Lab    Gases Sent?: No    Delayed cord clamping?: Yes    Cord Clamping Delay (seconds): 31-60 seconds        Resuscitation    Methods: None  [facility] at Delivery: Male infant born at 0549.  Spontaneous vaginal delivery, Mr. Sanchez in attendance. APGARS 9/9. Infant placed skin to skin with Mother at delivery. True knot in cord and a nuchal cord. Infant remains with  Mother.     Labor Events and Shoulder Dystocia    Fetal Tracing Prior to Delivery: Category 1  Shoulder dystocia present?: Neg     Delivery (Maternal) (Provider to Complete) ([id number])    Episiotomy: None  Perineal lacerations: 2nd    Repair suture: 3-0 Rapide  Genital tract inspection done: Pos     Blood Loss  Mother: Fawn Davis #[id number]   Start of Mother's Information    IO Blood Loss  07/01/21 2300 - 07/02/21 0613    None           End of Mother's Information  Mother: Fawn Davis #[id number]          Delivery - Provider to Complete ([id number])    Attempted Delivery Types (Choose all that apply): Spontaneous Vaginal Delivery  Delivery Type (Choose the 1 that will go to the Birth History): Vaginal, Spontaneous                                 Presentation and Position    Presentation: Vertex     Occiput Anterior                 Romero Sanchez MD

## 2021-07-02 NOTE — LACTATION NOTE
"Initial Lactation Consultation    Fawn Davis                                                                                                    [id number]    Consultation Date: 2021    Reason for Lactation Referral:routine lactation assessment.    MATERNAL HISTORY   Maternal History: 3rd baby, vaginal delivery  History of Breast Surgery: No  Breast Changes During Pregnancy: Yes  Breast Feeding History: Yes,  successful, Length of Time: 4 and 6 months  Maternal Meds: see eMar    MATERNAL ASSESSMENT    Breast Size: average  Nipple Appearance - Left: intact  Nipple Appearance - Right: intact  Nipple Erectility - Left: erect with stimulation  Nipple Erectility - Right: erect with stimulation  Areolas Compressibility: soft  Nipple Size: average  Milk Supply: colostrum    INFANT ASSESSMENT    Oral Anatomy  Mouth: normal  Palate: normal  Jaw: normal    FEEDING   Feeding Time:0840  Position: left breast  Effort to Latch: awake and alert, latched easily  Results: excellent breast feed    FEEDING PLAN    Inpatient Feeding Plan: Nurse on demand, responding to infant's feeding cues. Snuggle in skin-to-skin to learn positioning and infant cues. Rooming-in encouraged.    LACTATION COMMENTS   Anticipatory guidance provided in regard to \"baby's second night.\"    Link provided for Maximizing Milk Production at [employer] by Dr. Mary Morrison.    Deep latch explained for proper positioning of breast in infant's mouth, maximizing milk transfer and comfort.  Hand expression taught and return demonstration observed with colostrum present.  [place] signs of satiety reviewed.  \"Ways to know that baby is getting enough\" discussed thoroughly.  Follow-up support information provided.        __________________________________________________________________________________  BEAU BURRIS RN, IBCLC  2021      "

## 2021-07-02 NOTE — PLAN OF CARE
Assessments completed as charted. B/P: 121/69, T: 97.5, P: 65, R: 16. Rates pain: 0/10. Voiding without difficulty. Fundus: Midline Firm @U. Lochia: Light. Activity: unrestricted with out pain . Infant feeding: Breast feeding going well.     LATCH Score:   Latch: 2 - Good Latch  Audible Swallowin - Spontaneous & frequent  Type of Nipple: (Breast/Nipple) 2 - Everted  Comfort: 2 - Soft, Nontender  Hold: 2 - No Assist   Total LATCH Score:     Postpartum breastfeeding assessment completed and education provided, see Patient Education Activity.  Items included in the education are:   proper positioning and latch  effectiveness of feeding  manual expression  handling and storing breastmilk  maintenance of breastfeeding for the first 6 months  sign/symptoms of infant feeding issues requiring referral to qualified health care provider  Postpartum care education provided, see Patient Education activity. Patient denies needs. Will monitor.  Ko Fuentes RN

## 2021-07-02 NOTE — PLAN OF CARE
Assessments completed as charted. B/P: 133/81, T: 98.1, P: 62, R: 16. Rates pain: 1/10 intermittent uterine cramping and perineum soreness. Declines intervention. Voiding without difficulty. Fundus: Midline @U. Lochia: Light. Activity: normal activity. Infant feeding: Breast feeding going with mild difficulty.     Postpartum breastfeeding assessment completed and education provided, see Patient Education Activity.  Items included in the education are:   proper positioning and latch  effectiveness of feeding  manual expression  handling and storing breastmilk  maintenance of breastfeeding for the first 6 months  sign/symptoms of infant feeding issues requiring referral to qualified health care provider  Postpartum care education provided, see Patient Education activity. Patient denies needs. Will monitor.  Estefania Griggs RN

## 2021-07-02 NOTE — PLAN OF CARE
Face to face report given with opportunity to observe patient.    Report given to jovani Fuentes RN   7/2/2021  2:53 PM

## 2021-07-02 NOTE — PLAN OF CARE
Labor Admission  Fawn Davis  MRN: [id number]  Gestational Age: 39w1d      Fawn Davis is admitted for active labor.  States jenniffer since 2300.  Rates pain at 8/10 with contractions.  Bleeding: denies . Loss of fluid: SROM at 2300, clear fluid, large amount noted.    Dr Sanchez notified of arrival and condition and Intrapartum orders initiated.      FHT: 130s  Uterine Assessment: Contractions: frequency q 5 minutes of strong quality.    Patient is alert and oriented X 3, Patient oriented to room, unit, hourly rounding, and plan of care.  Call light within reach. Explained admission packet with patient bill of rights brochure.     Inpatient nursing criteria listed below was met:  Health care directives status obtained and documented: Yes  Patient identifies a surrogate decision maker: no  Core Measure diagnosis present:: No  Vaccine assessment done and vaccines ordered if appropriate. NA  Clergy visit ordered if patient requests: N/A  Skin issues/needs documented: N/A  Isolation needs addressed, if appropriate: NA  Fall Prevention (Med and High risk): Care plan updated, Education given and documented and signage used: N/A  Care Plan initiated: Yes  Education Documented (Reminder to educate patient if MRSA is present on admission): Yes  Education Assessment documented:Yes  Patient has discharge needs (If yes, please explain): No

## 2021-07-03 LAB — T PALLIDUM AB SER QL: NONREACTIVE

## 2021-07-03 PROCEDURE — 120N000001 HC R&B MED SURG/OB

## 2021-07-03 PROCEDURE — 250N000013 HC RX MED GY IP 250 OP 250 PS 637: Performed by: OBSTETRICS & GYNECOLOGY

## 2021-07-03 RX ADMIN — Medication 800 MCG: at 09:16

## 2021-07-03 RX ADMIN — SENNOSIDES AND DOCUSATE SODIUM 1 TABLET: 8.6; 5 TABLET ORAL at 09:16

## 2021-07-03 ASSESSMENT — ACTIVITIES OF DAILY LIVING (ADL)
FALL_HISTORY_WITHIN_LAST_SIX_MONTHS: NO
TOILETING_ISSUES: NO

## 2021-07-03 NOTE — PLAN OF CARE
Face to face report given with opportunity to observe patient.    Report given to steve betancourt RN   7/3/2021  7:28 AM

## 2021-07-03 NOTE — PLAN OF CARE
Dorota phoned in. Will initiate formula for babe due to blili level. Mom aware as previous children had same issue. Mom just attempted to feed babe but will feed formula to babe at this time. Will continue to monitor, declines needs.

## 2021-07-03 NOTE — PLAN OF CARE
Assessments completed as charted. B/P: 145/73, T: 98.7, P: 56, R: 17. Rates pain: 0/10 . Voiding without difficulty. Fundus: Midline . Lochia: Light. Activity: unrestricted with out pain . Infant feeding: Breast feeding going well.     LATCH Score:   Latch: 2 - Good Latch  Audible Swallowin - Spontaneous & frequent  Type of Nipple: (Breast/Nipple) 2 - Everted  Comfort: 2 - Soft, Nontender  Hold: 2 - No Assist   Total LATCH Score:     Postpartum breastfeeding assessment completed and education provided, see Patient Education Activity.  Items included in the education are:   proper positioning and latch  effectiveness of feeding  manual expression  handling and storing breastmilk  maintenance of breastfeeding for the first 6 months  sign/symptoms of infant feeding issues requiring referral to qualified health care provider  Postpartum care education provided, see Patient Education activity. Patient denies needs. Will monitor.  Carmen Richard RN

## 2021-07-03 NOTE — PLAN OF CARE
Report given with opportunity to observe patient.    Report given to Philomena Griggs RN   7/2/2021  7:31 PM

## 2021-07-03 NOTE — PLAN OF CARE
Assessments completed as charted. B/P: 146/88, T: 98.2, P: 56, R: 16. Rates pain: 0/10 none. Voiding without difficulty. Fundus: Midline Firm @U. Lochia: Light. Activity: unrestricted with out pain . Infant feeding: Breast feeding going well.       LATCH Score:   Latch: 2 - Good Latch  Audible Swallowin - Spontaneous & frequent  Type of Nipple: (Breast/Nipple) 2 - Everted  Comfort: 2 - Soft, Nontender  Hold: 2 - No Assist   Total LATCH Score: 10    Postpartum breastfeeding assessment completed and education provided, see Patient Education Activity.  Items included in the education are:   proper positioning and latch  effectiveness of feeding  manual expression  handling and storing breastmilk  maintenance of breastfeeding for the first 6 months  sign/symptoms of infant feeding issues requiring referral to qualified health care provider  Postpartum care education provided, see Patient Education activity. Patient denies needs. Will monitor.  Ko Fuentes RN

## 2021-07-03 NOTE — PLAN OF CARE
Assessments completed as charted. B/P: 138/79, T: 98.2, P: 56, R: 16. Rates pain: 0/10 . Voiding without difficulty. Fundus: Midline F @U. Lochia: Scant Activity: unrestricted with out pain . Infant feeding: Breast feeding going well. Dr Raya in to talk with pt about plan with baby today. Encouraged pt activity.     LATCH Score:   Latch: 2 - Good Latch  Audible Swallowin - Spontaneous & frequent  Type of Nipple: (Breast/Nipple) 2 - Everted  Comfort: 2 - Soft, Nontender  Hold: 2 - No Assist   Total LATCH Score: 10    Postpartum breastfeeding assessment completed and education provided, see Patient Education Activity.  Items included in the education are:   proper positioning and latch  effectiveness of feeding  manual expression  handling and storing breastmilk  maintenance of breastfeeding for the first 6 months  sign/symptoms of infant feeding issues requiring referral to qualified health care provider  Postpartum care education provided, see Patient Education activity. Patient denies needs. Will monitor.  Ko Fuentes RN

## 2021-07-03 NOTE — PROGRESS NOTES
POSTPARTUM DAY #1    ADMIT DATE=  7-2-2021    ADMIT DIAGNOSIS=    TERM AT 39 WEEKS   LABOR   NVD [ 7-2-21]   OBSTETRICAL LACERATION    SUBJECTIVE=   DIET TOLERATED   PAIN CONTROLLED WITH ORAL MEDS   DENIES NAUSEA / EMESIS / DIARRHEA / SOB / DOZZINESS   FLATUS     OBJECTIVE  Vitals:    07/02/21 1207 07/02/21 1530 07/02/21 2215 07/03/21 0804   BP: 126/84 133/81 145/73 138/79   BP Location:       Pulse:  62 56    Resp: 16 16 17 16   Temp: 98  F (36.7  C) 98.1  F (36.7  C) 98.7  F (37.1  C) 98.2  F (36.8  C)   TempSrc: Oral Oral Oral Oral   SpO2: 97% 100% 96% 97%   Weight:       Height:         Awake and alert  Neck= no lad  Heart= rrr normal s1s2  Lungs= cta bilaterally  Firm fundus below umbilicus  No calf tenderness  Dtr= 1-2/4    Labs=  Results for MAXINE VALLADARES (MRN [id number]) as of 7/3/2021 11:28   Ref. Range 7/2/2021 01:07 7/2/2021 01:13 7/2/2021 06:15   WBC Latest Ref Range: 4.0 - 11.0 10e9/L  9.5    Hemoglobin Latest Ref Range: 11.7 - 15.7 g/dL  11.6 (L)    Hematocrit Latest Ref Range: 35.0 - 47.0 %  35.3    Platelet Count Latest Ref Range: 150 - 450 10e9/L  217    RBC Count Latest Ref Range: 3.8 - 5.2 10e12/L  3.94    MCV Latest Ref Range: 78 - 100 fl  90    MCH Latest Ref Range: 26.5 - 33.0 pg  29.4    MCHC Latest Ref Range: 31.5 - 36.5 g/dL  32.9    RDW Latest Ref Range: 10.0 - 15.0 %  14.0    Diff Method Unknown  Automated Method    % Neutrophils Latest Units: %  70.9    % Lymphocytes Latest Units: %  22.3    % Monocytes Latest Units: %  4.9    % Eosinophils Latest Units: %  1.3    % Basophils Latest Units: %  0.2    % Immature Granulocytes Latest Units: %  0.4    Nucleated RBCs Latest Ref Range: 0 /100  0    Absolute Neutrophil Latest Ref Range: 1.6 - 8.3 10e9/L  6.7    Absolute Lymphocytes Latest Ref Range: 0.8 - 5.3 10e9/L  2.1    Absolute Monocytes Latest Ref Range: 0.0 - 1.3 10e9/L  0.5    Absolute Eosinophils Latest Ref Range: 0.0 - 0.7 10e9/L  0.1    Absolute Basophils Latest Ref Range: 0.0 - 0.2  10e9/L  0.0    Abs Immature Granulocytes Latest Ref Range: 0 - 0.4 10e9/L  0.0    Absolute Nucleated RBC Unknown  0.0    ABO Unknown  O    RH(D) Unknown  Neg    Antibody Screen Unknown  Neg    Test Valid Only At Latest Units:      [facility]    Specimen Expires Unknown  07/05/2021    Rhogam Order Unknown   See Rhogam Study/Suitability   Treponema Antibodies Latest Ref Range: NR^Nonreactive   Nonreactive    SARS-CoV-2 Virus Specimen Source Unknown Nasopharyngeal     SARS-CoV-2 PCR Result Unknown NEGATIVE       Assessment;  30 y.o G 3 p3003 ppd #1 aftern NVD, RH NEG, BABY STARTED PHOTOTHERAPY UNDER BILI LIGHTS.    1. INCREASED ACTIVITY.  2. RHOGAM PER SCHEDULE.  3. HOME TOMORROW.

## 2021-07-04 VITALS
OXYGEN SATURATION: 98 % | TEMPERATURE: 98.6 F | HEIGHT: 65 IN | HEART RATE: 72 BPM | RESPIRATION RATE: 16 BRPM | BODY MASS INDEX: 34.32 KG/M2 | WEIGHT: 206 LBS | DIASTOLIC BLOOD PRESSURE: 82 MMHG | SYSTOLIC BLOOD PRESSURE: 133 MMHG

## 2021-07-04 PROCEDURE — 250N000013 HC RX MED GY IP 250 OP 250 PS 637: Performed by: OBSTETRICS & GYNECOLOGY

## 2021-07-04 RX ADMIN — Medication 800 MCG: at 08:29

## 2021-07-04 RX ADMIN — DOCUSATE SODIUM AND SENNOSIDES 2 TABLET: 8.6; 5 TABLET, FILM COATED ORAL at 08:28

## 2021-07-04 RX ADMIN — SENNOSIDES AND DOCUSATE SODIUM 1 TABLET: 8.6; 5 TABLET ORAL at 00:22

## 2021-07-04 NOTE — PLAN OF CARE
Face to face report given with opportunity to observe patient.    Report given to Neris Esteban RN   7/4/2021  7:04 AM

## 2021-07-04 NOTE — PLAN OF CARE
Face to face report given with opportunity to observe patient.    Report given to Pam Fuentes RN   7/3/2021  7:21 PM

## 2021-07-04 NOTE — PLAN OF CARE
Assessments completed as charted. B/P: 133/82, T: 98.6, P: 72, R: 16. Rates pain: 0/10. Voiding without difficulty. Fundus: Midline, firm and U/2. Lochia: scant-light, brownish-red in color, no clots. Activity: normal activity. Infant feeding: Breast feeding going well.     LATCH Score:   Latch: 2 - Good Latch  Audible Swallowin - Spontaneous & frequent  Type of Nipple: (Breast/Nipple) 2 - Everted  Comfort: 1 - Filling, small blisters, mild/mod pain  Hold: 2 - No Assist   Total LATCH Score: 9    Postpartum breastfeeding assessment completed and education provided, see Patient Education Activity.  Items included in the education are:   proper positioning and latch  effectiveness of feeding  manual expression  handling and storing breastmilk  maintenance of breastfeeding for the first 6 months  sign/symptoms of infant feeding issues requiring referral to qualified health care provider  Postpartum care education provided, see Patient Education activity. Patient denies needs. Will monitor.  Prerna Griffith RN

## 2021-07-04 NOTE — DISCHARGE SUMMARY
DISCHARGE NOTE    POSTPARTUM DAY #2     ADMIT DATE=  7-2-2021     ADMIT DIAGNOSIS=              TERM AT 39 WEEKS              LABOR              NVD [ 7-2-21]              OBSTETRICAL LACERATION    DISCHARGE DATE  7-4-2021     SUBJECTIVE=              DIET TOLERATED              PAIN CONTROLLED WITH ORAL MEDS              DENIES NAUSEA / EMESIS / DIARRHEA / SOB / DOZZINESS              FLATUS+                OBJECTIVE    Vitals:    07/03/21 0804 07/03/21 1439 07/03/21 2250 07/04/21 0725   BP: 138/79 146/88 128/83 135/90   Pulse:   62 51   Resp: 16  16 16   Temp: 98.2  F (36.8  C) 98.2  F (36.8  C) 98.1  F (36.7  C) 97.3  F (36.3  C)   TempSrc: Oral Oral Oral Oral   SpO2: 97%  96% 97%   Weight:       Height:         Awake and alert  Neck= no lad  Heart= RRR normal S1S2  Lungs= CTA bilaterally  Firm fundus 2 CM below umbilicus  No calf tenderness  Dtr= 1-2/4     Labs=    Results for MAXINE VALLADARES (MRN [id number]) as of 7/4/2021 09:52   Ref. Range 7/2/2021 01:13 7/2/2021 06:15   WBC Latest Ref Range: 4.0 - 11.0 10e9/L 9.5    Hemoglobin Latest Ref Range: 11.7 - 15.7 g/dL 11.6 (L)    Hematocrit Latest Ref Range: 35.0 - 47.0 % 35.3    Platelet Count Latest Ref Range: 150 - 450 10e9/L 217                                                                                                                            ABO Unknown O    RH(D) Unknown Neg    Antibody Screen Unknown Neg    Test Valid Only At Latest Units:     [facility]    Specimen Expires Unknown 07/05/2021    Rhogam Order Unknown  See Rhogam Study/Suitability   Treponema Antibodies Latest Ref Range: NR^Nonreactive  Nonreactive          Assessment;  30 y.o G 3 p3003 PPD#2 S/P NVD, RH NEG [ NO RHOGAM NEEDED], BABY RECEIVING PHOTOTHERAPY UNDER BILI LIGHTS, AF/VSS READY FOR DISCHARGE.    1. DISCHARGE TO HOME.   2. PELVIC REST.  3. PAIN MEDS= OTC IBUPROFEN NEEDED.  4. INFECTION, BLEEDING, HTN, COVID  AND DEPRESSION PRECAUTIONS  5. REGULAR DIET  6. F/U  3-5 DAYS FOR BP CHECK.  7. MAY ROOM IN IF BABY REMAINS.

## 2021-07-04 NOTE — PLAN OF CARE
Patient discharged at 6:40 PM via ambulation accompanied by spouse and staff. Prescriptions: none ordered for discharge. All belongings sent with patient.     Discharge instructions reviewed with pt and pt's spouse. Listed belongings gathered and returned to patient.    Patient discharged to home.     Core Measures and Vaccines  Core Measures applicable during stay: N/A  Pneumonia and Influenza given prior to discharge, if indicated: N/A    Surgical Patient   Surgical Procedures during stay: No  Did patient receive discharge instruction on wound care and recognition of infection symptoms? N/A    MISC  Follow up appointment made:  Yes  Home and hospital aquired medications returned to patient: N/A  Patient reports pain was well managed at discharge: Yes

## 2021-07-04 NOTE — PLAN OF CARE
Assessments completed as charted. B/P: 128/83, T: 98.1, P: 62, R: 16. Rates pain: 0/10 pt has denied any pain this shift. Voiding without difficulty. Fundus: Midline, firm, U/-2. Lochia: Light. Activity: normal activity. Infant feeding: Breast feeding going well and supplementing with formula d/t hyperbilirubinemia.     LATCH Score:   Latch: 2 - Good Latch  Audible Swallowin - Spontaneous & frequent  Type of Nipple: (Breast/Nipple) 2 - Everted  Comfort: 1 - Filling, small blisters, mild/mod pain  Hold: 2 - No Assist   Total LATCH Score: 9    Postpartum breastfeeding assessment completed and education provided, see Patient Education Activity.  Items included in the education are:   proper positioning and latch  effectiveness of feeding  manual expression  handling and storing breastmilk  maintenance of breastfeeding for the first 6 months  sign/symptoms of infant feeding issues requiring referral to qualified health care provider  Postpartum care education provided, see Patient Education activity. Patient denies any needs at this time. Will continue to monitor.

## 2021-07-04 NOTE — PLAN OF CARE
Assessments completed as charted. B/P: 135/90, T: 97.3, P: 51, R: 16. Rates pain: 0/10. Voiding without difficulty. Fundus: Midline @U-2. Lochia: Light. Activity: normal activity. Infant feeding: Breast feeding going well.     LATCH Score:   Latch: 2 - Good Latch  Audible Swallowin - Few  Type of Nipple: (Breast/Nipple) 2 - Everted  Comfort: 1 - Filling, small blisters, mild/mod pain  Hold: 1 - Min. Assist   Total LATCH Score: 7    Postpartum breastfeeding assessment completed and education provided, see Patient Education Activity.  Items included in the education are:   proper positioning and latch  effectiveness of feeding  manual expression  handling and storing breastmilk  maintenance of breastfeeding for the first 6 months  sign/symptoms of infant feeding issues requiring referral to qualified health care provider  Postpartum care education provided, see Patient Education activity. Patient denies needs. Will monitor.  Estefania Griggs RN

## 2021-07-04 NOTE — PLAN OF CARE
Face to face report given with opportunity to observe patient.    Report given to Jessica Griggs RN   7/4/2021  2:57 PM

## 2021-07-05 ENCOUNTER — HOSPITAL ENCOUNTER (OUTPATIENT)
Dept: OBGYN | Facility: HOSPITAL | Age: 31
Discharge: HOME | End: 2021-07-05
Attending: OBSTETRICS & GYNECOLOGY | Admitting: OBSTETRICS & GYNECOLOGY
Payer: COMMERCIAL

## 2021-07-05 PROCEDURE — G0463 HOSPITAL OUTPT CLINIC VISIT: HCPCS

## 2021-07-05 NOTE — LACTATION NOTE
Follow-up Lactation Consultation    Fawn Davis                                                                                                   [id number]      Consultation Date: 2021     Reason for Lactation Referral: weight and latch check    Baby's : 21    Primary Care Provider: Mom-Fawn-Dr. Sanchez/Pam; Baby boy Jimmy-Dr. RADHA Sales    History of Present Illness: Fawn presents with her  and 3 day old Jimmy. She states her milk just came in, and Jimmy did a lot of cluster feeding last night. States he is latching deeply, she only has soreness with beginning of latch. Denies pain, cracks, creases or blisters with feeding. He is voiding and stooling appropriate for age. They have given 2 bottle of formula per order due to hyperbilirubinemia. Jimmy will have bili level checked after this appointment. Fawn states her breasts are very full and feels some ducts may be plugged.    MATERNAL HISTORY   History of Breast Surgery: No  Breast Changes During Pregnancy: Yes  Breast Feeding History: Breast fed first 2 boys-4 and 6 months, when returns to work, tends to lose milk supply  Maternal Meds: folic acid    MATERNAL ASSESSMENT    Breast Size: average, symmetrical, soft after feeding and filling prior to feeding  Nipple Appearance - Left: intact  Nipple Appearance - Right: intact  Nipple Erectility - Left: erect with stimulation  Nipple Erectility - Right: erect with stimulation  Areolas Compressibility: soft  Nipple Size: average  Milk Supply: mature    INFANT ASSESSMENT    Oral Anatomy  Mouth: normal  Palate: normal  Jaw: normal  Tongue: normal  Frenulum: normal     FEEDING   Feeding Time:  0920 for 23 minutes  Position: left breast, right breast, modified cradle  Effort to Latch: awake and alert, latched easily  Duration of Breast Feeding: Right Breast: 10; Left Breast: 13  Results: excellent breast feed    Volume of Intake:    Birth Weight: 7lb 12.7oz    Discharge from Hospital after delivery  weight: 7lb 4.2oz   TODAY 2021    Pre-Weight: 7lb 5.5oz with onsie on    Post-Weight: 7lb 6oz    Total Intake: 0.5oz (Jimmy had  half hour before appt)  Output: No output during breastfeeding session    LATCH Score:   Latch: 2 - Good Latch  Audible Swallowin - Spontaneous & frequent  Type of Nipple: (Breast/Nipple) 2 - Everted  Comfort: 2 - Soft, Nontender  Hold: 2 - No Assist   Total LATCH Score: 10    FEEDING PLAN    Home Feeding Plan: Continue to feed on demand when  elicits feeding cues with deep latch.  Discussed ways to release plugged duct-no restrictive clothing, warm moist compresses, or bath, gentle massage, ibuprofen, attaching Jimmy's chin so it points to area that feels blocked.  Can try probiotic, lecithin supplements.  Continue to apply expressed breast milk and Lanolin cream to nipples after feedings for healing and comfort.  Postpartum breastfeeding assessment completed and education provided.  Items included in the education are:     proper positioning and latch    effectiveness of feeding    manual expression    handling and storing breastmilk    maintenance of breastfeeding for the first 6 months    sign/symptoms of infant feeding issues requiring referral to qualified health care provider    LACTATION COMMENTS   Deep latch explained for proper positioning of breast in infant's mouth, maximizing milk transfer and comfort.  Hand expression taught and return demonstration observed with mature milk present.  Reassurance and encouragement provided in regard to mom's concerns about milk supply.  Follow-up support information provided.  Parents plan to keep  Well-Child Check with Dr. RADHA Sales next week for further support and monitoring.      Face-to-face Time: 60 minutes with assessment and education.    BEAU BURRIS RN, IBCLC  2021  10:21 AM

## 2021-07-05 NOTE — PATIENT INSTRUCTIONS
FEEDING   Feeding Time:  0920 for 23 minutes  Position: left breast, right breast, modified cradle  Effort to Latch: awake and alert, latched easily  Duration of Breast Feeding: Right Breast: 10; Left Breast: 13  Results: excellent breast feed    Volume of Intake:    Birth Weight: 7lb 12.7oz    Discharge from Hospital after delivery weight: 7lb 4.2oz   TODAY 2021    Pre-Weight: 7lb 5.5oz    Post-Weight: 7lb 6oz    Total Intake: 0.5oz  Output: No output during breastfeeding session    LATCH Score:   Latch: 2 - Good Latch  Audible Swallowin - Spontaneous & frequent  Type of Nipple: (Breast/Nipple) 2 - Everted  Comfort: 2 - Soft, Nontender  Hold: 2 - No Assist   Total LATCH Score: 10    FEEDING PLAN    Home Feeding Plan: Continue to feed on demand when  elicits feeding cues with deep latch.  Discussed ways to release plugged duct-no restrictive clothing, warm moist compresses, or bath, gentle massage, ibuprofen, attaching Jimmy's chin so it points to area that feels blocked.  Can try probiotic, lecithin supplements.  Continue to apply expressed breast milk and Lanolin cream to nipples after feedings for healing and comfort.  Postpartum breastfeeding assessment completed and education provided.  Items included in the education are:     proper positioning and latch    effectiveness of feeding    manual expression    handling and storing breastmilk    maintenance of breastfeeding for the first 6 months    sign/symptoms of infant feeding issues requiring referral to qualified health care provider

## 2021-07-06 ENCOUNTER — MYC MEDICAL ADVICE (OUTPATIENT)
Dept: OBGYN | Facility: OTHER | Age: 31
End: 2021-07-06

## 2021-07-09 ENCOUNTER — OFFICE VISIT (OUTPATIENT)
Dept: OBGYN | Facility: OTHER | Age: 31
End: 2021-07-09
Attending: NURSE PRACTITIONER
Payer: COMMERCIAL

## 2021-07-09 VITALS — HEIGHT: 65 IN | SYSTOLIC BLOOD PRESSURE: 128 MMHG | DIASTOLIC BLOOD PRESSURE: 79 MMHG | BODY MASS INDEX: 34.28 KG/M2

## 2021-07-09 DIAGNOSIS — Z01.30 BLOOD PRESSURE CHECK: Primary | ICD-10-CM

## 2021-07-09 PROCEDURE — 99207 PR NO CHARGE NURSE ONLY: CPT | Performed by: NURSE PRACTITIONER

## 2021-07-09 NOTE — PROGRESS NOTES
Patient came in today to have her BP checked due to it being elevated after delivery. BP reading 128/79. Patient had no questions, concerns, or issues. She will return for her 2 week pp and her 6 week pp. Will call with any other questions or concerns should they arise before her appointments.

## 2021-07-16 ENCOUNTER — PRENATAL OFFICE VISIT (OUTPATIENT)
Dept: OBGYN | Facility: OTHER | Age: 31
End: 2021-07-16
Attending: NURSE PRACTITIONER
Payer: COMMERCIAL

## 2021-07-16 VITALS
WEIGHT: 183.4 LBS | HEART RATE: 54 BPM | BODY MASS INDEX: 30.56 KG/M2 | OXYGEN SATURATION: 100 % | DIASTOLIC BLOOD PRESSURE: 64 MMHG | HEIGHT: 65 IN | SYSTOLIC BLOOD PRESSURE: 119 MMHG

## 2021-07-16 DIAGNOSIS — Z30.011 ENCOUNTER FOR INITIAL PRESCRIPTION OF CONTRACEPTIVE PILLS: Primary | ICD-10-CM

## 2021-07-16 PROCEDURE — 99207 PR NO CHARGE LOS: CPT | Performed by: NURSE PRACTITIONER

## 2021-07-16 RX ORDER — ACETAMINOPHEN AND CODEINE PHOSPHATE 120; 12 MG/5ML; MG/5ML
0.35 SOLUTION ORAL DAILY
Qty: 84 TABLET | Refills: 4 | Status: SHIPPED | OUTPATIENT
Start: 2021-07-16 | End: 2022-10-17

## 2021-07-16 ASSESSMENT — MIFFLIN-ST. JEOR: SCORE: 1552.78

## 2021-07-16 ASSESSMENT — PAIN SCALES - GENERAL: PAINLEVEL: NO PAIN (0)

## 2021-07-16 NOTE — PROGRESS NOTES
"SUBJECTIVE:  Fawn Davis is a 30 year old female P3 here for a 2 week postpartum visit.  She had a  on 21 delivering a healthy baby boy weighing 7 lbs 12 oz at term.      Today's Depression Rating was 3    delivery complications:  No  breast feeding:  Yes, breast and bottle  bladder problems:  No  bowel problems/hemorrhoids:  No  episiotomy/laceration/incision healed? Yes: improving  vaginal flow:  Scant to moderate  [place]:  No  contraception:  oral contraceptive  emotional adjustment:  doing well and happy      OBJECTIVE:  Blood pressure 119/64, pulse 54, height 1.651 m (5' 5\"), weight 83.2 kg (183 lb 6.4 oz), last menstrual period 2020, SpO2 100 %, unknown if currently breastfeeding.   General - pleasant female in no acute distress.      ASSESSMENT:  normal 2 week postpartum exam    PLAN:  Continue with PP restrictions  Breastfeeding support and anticipatory guidance.  Signs of PPD reviewed  BP stable now.  Return at 6 weeks PP as scheduled.   "

## 2021-07-16 NOTE — PATIENT INSTRUCTIONS
Patient Education     For New Mothers: Staying Fit After Delivery    After you deliver your baby, you can start to exercise when you feel ready. Let your body be your guide. Most women are ready to exercise after 6 weeks, where some women will be ready a few days after giving birth. If you ve had a  section, you will need more time. Ask your healthcare provider when it is safe to start exercising again.  Exercise tips for new mothers  You can start doing Kegel exercises as soon as you deliver your baby. Do them at least 10 times a day to help avoid bladder problems later on. Kegel exercises help strengthen your pelvic muscles. To do them, squeeze the muscles that you use to stop passing urine (do not do this while urinating). Hold that squeeze for a count of 10, then release.   You will want to resume other exercise gradually and talk to your healthcare provider before starting. Always exercise with care. When you first start exercising after giving birth, try simple exercises that help strengthen major muscle groups, including abdominal and back muscles. Slowly add moderate-intensity exercise. Try to work up to at least 150 minutes of moderate-intensity aerobic activity every week. Moderate intensity means you are moving enough to raise your heart rate and start sweating. You can still talk normally. But you cannot sing. Muscle-strengthening exercises should be done along with your aerobic activity on at least 2 days a week. Look for ways to combine exercising with being with your new baby. Try putting your baby into a front pack or in the stroller and take a walk.  Strengthening stomach muscles  Many new mothers want to strengthen their stomach muscles after giving birth. Try this exercise when you re ready to resume your program. It will strengthen the front and side muscles of your stomach:    Lie on your back with your knees bent and feet flat on the floor. Cross your arms over your stomach. Use your  fingers to gently pull the sides of the stomach toward the middle of your body.    Exhale and try to pull the stomach muscles toward your spine. Gently raise your shoulders off the floor, no more than 6 to 8 inches. Hold for 5 seconds. Repeat 5 times.  [employer] last reviewed this educational content on 2/1/2018 2000-2021 [employer]. All rights reserved. This information is not intended as a substitute for professional medical care. Always follow your healthcare professional's instructions.           Patient Education     Understanding Postpartum Depression  You ve just had a baby. You expected to be excited and happy. But instead you find yourself crying for no reason. You may have trouble coping with your daily tasks. You feel sad, tired, and hopeless most of the time. You may even feel ashamed or guilty. But what you re going through is not your fault and you can feel better. Talk with your healthcare provider. He or she can help.    What is depression?  Depression is a mood disorder that affects the way you think and feel. The most common symptom is a feeling of deep sadness. You may also feel as if you just can t cope with life. Other symptoms include:    Gaining or losing a lot of weight    Sleeping too much or too little    Feeling tired all the time    Feeling restless    Crying a lot    Having too little or too much appetite.    Withdrawing from friends and family    Having headaches, aches and pains, or stomach problems that won't go away    Feeling anger or rage    Fears of harming your baby    Lack of interest in your baby    Feeling worthless or guilty    No longer finding pleasure in things you used to    Having trouble thinking clearly or making decisions    Thinking about death or suicide  Depression after childbirth  You may be weepy and tired right after giving birth. These feelings are normal. They re sometimes called the  baby blues.  These blues go away after 1 to 2 weeks.  However, postpartum (meaning  after birth ) depression lasts much longer and is more severe than the baby blues. It can make you feel sad and hopeless. You may also fear that your baby will be harmed and worry about being a bad mother.  What causes postpartum depression?  The exact cause of postpartum depression is unknown. Changes in brain chemistry or structure are believed to play a big role in depression. It may be due to changes in your hormones during and after childbirth. You may also be tired from caring for your baby and adjusting to being a mother. All these factors may make you feel depressed. In some cases, your genes may also play a role.  Depression can be treated  There are many ways to treat postpartum depression. Talking to your healthcare provider is the first step toward feeling better.  When to call your healthcare provider  Call your healthcare provider if you:     Cry for no clear reason    Have trouble sleeping, eating, and making choices    Question if you can handle caring for a baby    Have intense feelings of sadness, anxiety, or despair that prevent you from being able to do your daily tasks  To learn more    National [place] of Mental Health, 703.650.5159, www.nimh.nih.gov    National [place] on Mental Illness, 904.322.1856, www.dennis.org    Mental Health [place], 729.524.6395, www.nmha.org    National Suicide Hotline, 800-SUICIDE (348-080-5269)    National Suicide Prevention Lifeline, 187.169.2654, www.suicidepreventionlifeline.org    [place] last reviewed this educational content on 5/1/2020 2000-2021 [employer]. All rights reserved. This information is not intended as a substitute for professional medical care. Always follow your healthcare professional's instructions.           Patient Education     2006 [place] Department of Health. Reprinted with permission. [employer] [id number] - REV 04/10.  Postpartum Depression  When Caring for Your Baby Is Not What You  "Expected  Stories from other mothers  \"This was my third baby, but it wasn't the happy, joyful experience I had expected. I felt anxious and irritable. I didn't want to get out of bed in the morning. I didn't feel connected to my baby.\" - Zoe  \"We were thrilled to bring home our first healthy baby. But in those first few weeks I felt tired and cried easily. I thought it was just the hormones and getting used to a . After six months, when little things would still set me off, my  convinced me to talk to my doctor.\" - Philomena  \"I love children and couldn't wait to have my own. Then my  went back to work, and I started having thoughts about hurting my baby. No matter what I did, I couldn't stop the thoughts. I lived in fear but kept it a secret.\" - Amna  \"It has been two months since I saw my doctor, and I feel like a different person. The medicine has helped and my family has been very supportive. I have energy again, and I love being a mother.\" - Salome  You are not alone  Although postpartum depression is common, it is serious--and treatable. If you think you might have it, tell your doctor or another health care provider. With help, you can feel like yourself again.   The baby blues   Having a baby brings big changes in a woman's life. These changes can be overwhelming. While most moms get past the \"baby blues\" within the first two weeks, some moms struggle for longer.   If the baby blues last longer than two weeks, you may have postpartum depression.  Postpartum depression  It is easy to confuse the symptoms of postpartum depression with normal hormone changes. How can you tell if it's serious? Watch for these symptoms:    Feeling sad, anxious or \"empty\"    Lack of energy, feeling very tired    Lack of interest in normal activities    Changes in sleeping or eating patterns    Feeling hopeless, helpless, guilty or worthless    Feeling abel and irritable    Problems concentrating or making " "simple decisions    Thoughts about hurting your baby, even if you will not act on them    Thoughts about death or suicide    Things you can do  Being a good mom means taking care of yourself. If you take care of yourself, you can take better care of your baby and your family.    Get help. Talk with your care provider, call an emergency support line or ask a loved one to help you get the care you need.    Ask your care provider about medicines that can be safely used for postpartum depression.    Talk to a therapist, alone or in group therapy.    Ask your omari or community leaders about other support resources.    Learn as much as you can about postpartum depression.    Get support from family and friends. Ask for help when you need it.    Keep active by walking, stretching, swimming and so on.    Get enough rest.    Eat a healthy diet.  Don't give up! It may take more than one try to get the help you need.  What you should know  It is very common for new moms to have the \"baby blues.\" They often start a few days after a baby's birth. Usually, feeling sad and irritable will not stop you from taking care of your baby or yourself.   If symptoms interfere with your life or last longer than two to three weeks, you may have postpartum depression. This affects up to 2 out of 10 moms. It can occur any time in your baby's first year.   Women who have a history of depression are more likely to become depressed during pregnancy or after birth. Depression can be caused by stress, hormone changes, trauma, lack of support and other factors.   If you are depressed, you need to get help. It will not get better on its own.  The best treatment   The most effective treatment for depression includes:    Individual or group therapy    Medicine that can be safely used while breastfeeding (prescribed by your doctor)    Support from your family, friends and community  Who to contact for help   [facility]: 1-781.277.4068; TTY " "612.812.7980  [facility]: 793.277.2297 (Specialists in postpartum depression offer individual, couples and group therapy)   [facility]: www.mariah.org  National Suicide Prevention Lifeline: 8-960-347-TALK [1931]  PPD Hope Information Center: www.ppdhope.com  Pregnancy and Postpartum Support [place]: www.pregnancypostpartumsupportmn.org  This brochure meets the requirements of [place] Statute 145.906. For more information, call the [facility] Department of Health at 906-158-2487 or visit our website at www.health.[facility].mn./divs/fh/mch/.     BREASTFEEDING TIPS  1. Breastfeed every 2-4 hours. If your baby is sleepy - use breast compression, push on chin to \"start up\" baby, switch breasts, undress to diaper and wake before relatching.   Some babies \"cluster\" feed every 1 hour for a while- this is normal. Feed your baby whenever he/she is awake-  even if every hour for a while. This frequent feeding will help you make more milk and encourage your baby to sleep for longer stretches later in the evening or night.    - Position your baby close to you with pillows so he/she is facing you -belly to belly laying horizontally across your lap at the level of your breast and looking a bit \"upwards\" to your breast   -One hand holds the baby's neck behind the ears and the other hand holds your breast  -Baby's nose should start out pointing to your nipple before latching  - Hold your breast in a \"sandwich\" position by gently squeezing your breast in an oval shape and make sure your hands are not covering the areola  This \"nipple sandwich\" will make it easier for your breast to fit inside the baby's mouth-making latching more comfortable for you and baby and preventing sore nipples. Your baby should take a \"mouthful\" of breast!  - You may want to use hand expression to \"prime the pump\" and get a drip of milk out on your nipple to wake baby   (see website: " "newborns.[facility]/Breastfeeding/HandExpression.html)  - Swipe your nipple on baby's upper lip and wait for a BIG open mouth  - YOU bring baby to the breast (hold baby's neck with your fingers just below the ears) and bring baby's head to the breast--leading with the chin.  Try to avoid pushing your breast into baby's mouth- bring baby to you instead!  - Aim to get your baby's bottom lip LOW DOWN ON AREOLA (baby's upper lip just needs to \"clear\" the nipple) .   Your baby should latch onto the areola and NOT just the nipple. That way your baby gets more milk and you don't get sore nipples!      Useful web sites:  Www.infantrisk.com  Www.aap.org  Www.ibreastfeeding.com  Www.health.state.[place]  "

## 2021-07-16 NOTE — NURSING NOTE
"Chief Complaint   Patient presents with     Post Partum Exam     2 weeks        Initial /64 (BP Location: Right arm, Patient Position: Sitting, Cuff Size: Adult Regular)   Pulse 54   Ht 1.651 m (5' 5\")   Wt 83.2 kg (183 lb 6.4 oz)   LMP 09/30/2020 (Approximate)   SpO2 100%   BMI 30.52 kg/m   Estimated body mass index is 30.52 kg/m  as calculated from the following:    Height as of this encounter: 1.651 m (5' 5\").    Weight as of this encounter: 83.2 kg (183 lb 6.4 oz).  Medication Reconciliation: complete     Estefania Busby LPN    "

## 2021-08-12 ENCOUNTER — PRENATAL OFFICE VISIT (OUTPATIENT)
Dept: OBGYN | Facility: OTHER | Age: 31
End: 2021-08-12
Attending: OBSTETRICS & GYNECOLOGY
Payer: COMMERCIAL

## 2021-08-12 VITALS
OXYGEN SATURATION: 98 % | BODY MASS INDEX: 30.99 KG/M2 | WEIGHT: 186 LBS | HEIGHT: 65 IN | HEART RATE: 87 BPM | DIASTOLIC BLOOD PRESSURE: 82 MMHG | SYSTOLIC BLOOD PRESSURE: 122 MMHG

## 2021-08-12 DIAGNOSIS — Z30.011 ENCOUNTER FOR INITIAL PRESCRIPTION OF CONTRACEPTIVE PILLS: Primary | ICD-10-CM

## 2021-08-12 PROBLEM — Z34.82 NORMAL PREGNANCY IN MULTIGRAVIDA IN SECOND TRIMESTER: Status: RESOLVED | Noted: 2021-01-15 | Resolved: 2021-08-12

## 2021-08-12 PROCEDURE — 99207 PR POST PARTUM EXAM: CPT | Performed by: OBSTETRICS & GYNECOLOGY

## 2021-08-12 ASSESSMENT — MIFFLIN-ST. JEOR: SCORE: 1564.57

## 2021-08-12 ASSESSMENT — PAIN SCALES - GENERAL: PAINLEVEL: NO PAIN (0)

## 2021-08-12 NOTE — NURSING NOTE
"Chief Complaint   Patient presents with     Post Partum Exam       Initial /82 (BP Location: Left arm, Patient Position: Sitting, Cuff Size: Adult Large)   Pulse 87   Ht 1.651 m (5' 5\")   Wt 84.4 kg (186 lb)   LMP 09/30/2020 (Approximate)   SpO2 98%   BMI 30.95 kg/m   Estimated body mass index is 30.95 kg/m  as calculated from the following:    Height as of this encounter: 1.651 m (5' 5\").    Weight as of this encounter: 84.4 kg (186 lb).  Medication Reconciliation: complete  BRANDEN VO LPN  "

## 2021-08-12 NOTE — PROGRESS NOTES
"SUBJECTIVE:  Fawn Davis is a 30 year old female P3 here for a postpartum visit.  She had a   delivering a healthy baby boy  Currently no complaints and doing well.    Today's Depression Rating was 2    delivery complications:  No  breast feeding:  No  bladder problems:  No  bowel problems/hemorrhoids:  No  episiotomy/laceration/incision healed? Yes  vaginal flow:  None  [place]:  No  contraception:  abstinence  emotional adjustment:  doing well and happy      OBJECTIVE:  Blood pressure 122/82, pulse 87, height 1.651 m (5' 5\"), weight 84.4 kg (186 lb), last menstrual period 2020, SpO2 98 %, unknown if currently breastfeeding.   General - pleasant female in no acute distress.  Abdomen - soft, nontender, nondistended, no hepatosplenomegaly.  Pelvic - EG: normal adult female, BUS: within normal limits, Vagina: well rugated, no discharge, Cervix: no lesions or CMT, Uterus: firm, normal sized and nontender, Adnexae: no masses or tenderness.  Rectovaginal - deferred.    ASSESSMENT:  normal postpartum exam.  Released from pregnancy related restrictions    PLAN:  Discussed kegel exercises   May resume normal activities without restrictions  Pap smear Not Done today    The patient will use oral contraceptive for birth control. Full counseling was provided, and all questions answered. Compliance is strongly emphasized.  Return to clinic in one year for an annual, when due for a pap smear or PRN.    Romero Sanchez MD  "

## 2021-09-07 ENCOUNTER — MEDICAL CORRESPONDENCE (OUTPATIENT)
Dept: HEALTH INFORMATION MANAGEMENT | Facility: CLINIC | Age: 31
End: 2021-09-07

## 2021-10-03 ENCOUNTER — HEALTH MAINTENANCE LETTER (OUTPATIENT)
Age: 31
End: 2021-10-03

## 2022-01-05 NOTE — DISCHARGE INSTRUCTIONS
1/5/2022       RE: Carlos Schulz  [address] [facility] [address]     Dear Colleague,    Thank you for referring your patient, Carlos Schulz, to the [facility] MULTIPLE SCLEROSIS CLINIC [place] at [facility]. Please see a copy of my visit note below.    THE [facility] MULTIPLE SCLEROSIS CLINIC  FOLLOW UP VISIT           PRINCIPAL NEUROLOGIC DIAGNOSIS: Multiple Sclerosis        HISTORY OF ILLNESS:    This is a follow visit for this 20 year old right handed genetic male  With a history of RRMS. Who was last seen on  8-19-21.   At that time the patient was recommended to:    - Continue Gilenya indefinitely  - Vit D, CBC, CMP  - Brain MRI, will give valium with this as pt has difficulty staying still inside the scanner  - Follow up in 4 months    Since last visit he is doing well and denies any new neurological symptoms or anything that could be construed as a multiple sclerosis exacerbation.  He is left eye's visual acuity has improved significantly since the original optic neuritis attack and is currently 20/25.    He developed molluscum contagiosum again and is frustrated by this.  He also has mild LFT elevation, potentially related to ETOH intake.    He is wondering if given the fact that he has been stable for the past year since diagnosis if it would be wise to stop Gilenya and continue with diet, exercise, vitamins and potentially just alternative treatments.    I strongly recommend against it although I think that holistic approach is very important I do not believe that is enough to keep the disease from reactivating.  Especially being on Gilenya it is well-known that after 3 months of stopping it there can be rebound type of inflammatory activity in the brain and spinal cord that can put him at risk for severe relapse and potentially irreversible disability depending on where the lesions appear.    He is currently in  Postpartum Vaginal Delivery Instructions    Activity       Ask family and friends for help when you need it.    Do not place anything in your vagina for 6 weeks.    You are not restricted on other activities, but take it easy for a few weeks to allow your body to recover from delivery.  You are able to do any activities you feel up to that point.    No driving until you have stopped taking your pain medications (usually two weeks after delivery).     Call your health care provider if you have any of these symptoms:       Increased pain, swelling, redness, or fluid around your stiches from an episiotomy or perineal tear.    A fever above 100.4 F (38 C) with or without chills when placing a thermometer under your tongue.    You soak a sanitary pad with blood within 1 hour, or you see blood clots larger than a golf ball.    Bleeding that lasts more than 6 weeks.    Vaginal discharge that smells bad.    Severe pain, cramping or tenderness in your lower belly area.    A need to urinate more frequently (use the toilet more often), more urgently (use the toilet very quickly), or it burns when you urinate.    Nausea and vomiting.    Redness, swelling or pain around a vein in your leg.    Problems breastfeeding or a red or painful area on your breast.    Chest pain and cough or are gasping for air.    Problems coping with sadness, anxiety, or depression.  If you have any concerns about hurting yourself or the baby, call your provider immediately.     You have questions or concerns after you return home.     Keep your hands clean:  Always wash your hands before touching your perineal area and stitches.  This helps reduce your risk of infection.  If your hands aren't dirty, you may use an alcohol hand-rub to clean your hands. Keep your nails clean and short.      Pelvic rest for 6 weeks  No vigorous activity, exercise, for 2 weeks.  Then may resume normal activity as tolerated.   Call Dr. Sanchez [phone] as necessary if  college, about a year and a half from graduating and doing well.  He exercises, eats healthy, takes vitamins and has been reading a lot about his disease and trying to understand how to become healthier.    Drinks ETOH mostly on weekends.    Current Symptoms:  1. decreased libido/low testosterone levels  2. molluscum contagiosum  3. occasional fatigue        Current Outpatient Prescriptions:  Current Outpatient Medications   Medication     COENZYME Q-10 PO     fingolimod (GILENYA) 0.5 MG capsule     Omega-3 Fatty Acids (OMEGA 3 PO)     salicylic acid (COMPOUND W MAX STRENGTH) 17 % external gel     UNABLE TO FIND     No current facility-administered medications for this visit.          ALLERGIES     No Known Allergies        REVIEW OF SYSTEMS:    Comprehensive review of systems otherwise was negative, including constitutional, head and neck, cardiovascular, pulmonary, gastrointestinal, endocrine, urologic, reproductive, rheumatic, hematologic, immunologic, dermatologic, and psychiatric.    Nutritional concerns: None  Driving issues: None   Safety concerns regarding living situations and safety at home: None  Risk of falls: None  Pain: None    PHYSICAL EXAM:    Hair, skin, nails, and joints were normal. Neck was supple without Lhermitte's phenomenon.  There was no percussion tenderness over the spine.     The patient was alert and oriented to person, place, and time with normal language, attention and concentration, recent and remote memory, praxis, and intellectual function. Affect was normal. The patient did not appear depressed.    Visual acuity:  OD 20/20  OS 20/25    Correction: without    Visual fields were full to confrontation.   Pupils were 3 mm and briskly reactive OU without a relative afferent pupillary defect.  Funduscopic examination was deferred  Extraocular movements: Intact without ERIN  Facial sensation is normal. Normal strength of the muscles of mastication:   Muscles of facial expression were  problems in interim    *Call to schedule an appointment for a blood pressure check with Pam Colón CNP for 3-5 days after discharge*     normal  Hearing was normal. Gag reflex and palatal movements were normal. Sternocleidomastoid and trapezius power were normal. Tongue movements were normal. There was no dysarthria.    Motor Examination:   There was no pronator drift.       Motor    Upper      Right Left   Shoulder Abduction 5 5   Elbow Flexion 5 5   Elbow Extension 5 5   Wrist Extension 5 5   Digit Extension 5 5   Digit Flexion 5 5   APB 5 5   Tone 0 0   Lower       Right Left   Hip Flexion 5 5   Knee Extension 5 5   Knee Flexion 5 5   Foot Dorsiflexion 5 5   Foot Plantar Flexion 5 5   EH 5 5   Toe Flexion 5 5   Tone 0 0               Reflexes:     Reflexes       Right  Left   Biceps 1  1   Triceps 1  1   Brachioradialis 1  1   Patellar  Brisk  Brisk   Achilles Brisk USC  Brisk USC   Babinski down  down         Coordination:     Right Left   RRM Normal Normal   GRANT Normal Normal   FTN Normal Normal   RRM Normal Normal   HKS Normal Normal         Sensory examination:    Light touch:  Intact in all extremities      Coordination and Gait        Gait Normal   Right Left   Romberg Normal  Heel Normal Normal   Tandem {Normal  Toe Normal Normal                   QUANTITATIVE SCORES:    Visual: 0-Normal  Brainstem: 0-Normal  Pyramidal: 1-Signs only  Cerebellar: 0-Normal  Sensory: 0-Normal  Bladder/Bowel: 0-Normal  Cerebral: 0-Normal  Ambulatory: 0-Unrestricted    EDSS: 1.0- no disability, minimal signs in one FS (one FS grade 1)          REVIEW OF IMAGING STUDIES:    I personally reviewed the following images:    MRI of the brain cervical and thoracic spine obtained today were reviewed in detail and compared to the previous one from January 18, 2021, there is no interval change in the number or size of the T2 hyperintense lesions found in the brain, the cervical and thoracic cord and no enhancements.    ASSESSMENT:    Relapsing remitting multiple sclerosis currently stable on Gilenya from the clinical and the radiological standpoint. Feeling well no issues  or complaints. Was diagnosed with molluscum contagiosum again since last visit, which can be a sign of immunocompromise - as to be expected with treatment with Gilenya.     Today we discussed other options including dimethyl fumarate which has undesirable side effects and is twice a day, Tysabri which he would prefer not to try due to the fact that he is ROCIO virus positive, Ocrevus which causes significant immunosuppression and increased risk for infections including COVID-19 and Zeposia which is an  Improved oral medication similar to Gilenya but with less side effects and potentially less complications such as molluscum contagiosum.    Additionally, the patient has had low testosterone levels which she is rare for someone his age despite the fact that low testosterone levels can be seen in patients with multiple sclerosis.  It is my impression that this could be unrelated to MS in his case.    Today we discussed alternative medicine and a holistic approach to MS which I agree with but would prefer that he continues a disease modifying therapy.    PLAN:    Patient will consider a switch to Zeposia a more selective sphyngosine 1 phosphate receptor functional antagonist as opposed to Gilenya which is less selective and causes more side effects including decreased heart rate and liver enzyme elevation, which he has had issues with.    He has been given information on Zeposia and the start up form will be mailed to him so he can sign and return to us for submission, should he decide to move forward with the switch.    Additionally, he will be referred to endocrinology for full assessment of  sexual hormone levels/function.    Finally I will follow the patient up in 4 month(s) if he switches to Zeposia for 6 months if he continues on Gilenya, as long as the patient is doing well. I instructed the patient to call or mychart my office with any concerns or questions.    My recommendations will be communicated back to the  patient's physician(s) by mail.        Palak Florentino MD  Chief, Multiple Sclerosis Division  Department of Neurology  [facility]      BILLING TIME DOCUMENTATION:   The total TIME spent on this patient on the date of the encounter/appointment was 60 minutes.       TOTAL TIME includes:   Time spent preparing to see the patient (reviewing records and tests)   Time spent face to face (or over the phone) with the patient   Time spent ordering tests, medications, procedures and referrals  Time spent documenting clinical information in Epic  Time discussing the case with other providers              Again, thank you for allowing me to participate in the care of your patient.      Sincerely,    Palak Florentino MD

## 2022-02-10 ENCOUNTER — IMMUNIZATION (OUTPATIENT)
Dept: FAMILY MEDICINE | Facility: OTHER | Age: 32
End: 2022-02-10
Attending: FAMILY MEDICINE
Payer: COMMERCIAL

## 2022-02-10 PROCEDURE — 0054A COVID-19,PF,PFIZER (12+ YRS): CPT

## 2022-02-10 PROCEDURE — 90471 IMMUNIZATION ADMIN: CPT

## 2022-02-10 PROCEDURE — 91305 COVID-19,PF,PFIZER (12+ YRS): CPT

## 2022-02-10 PROCEDURE — 90686 IIV4 VACC NO PRSV 0.5 ML IM: CPT

## 2022-09-04 ENCOUNTER — HEALTH MAINTENANCE LETTER (OUTPATIENT)
Age: 32
End: 2022-09-04

## 2022-10-12 NOTE — PATIENT INSTRUCTIONS
Will call with lab results.    Pap/hpv 2021 - due to 2026.    Flu shot - to be done with family.    Monitor tremor - journal - is it both hands or just one - at rest, with hands up/out, with activity?  Consider imaging - brain MRI.    Moles - routine sunscreen.   Given multiple moles - dermatology referral.    Compression stockings for varicose veins.  Consider ultrasound to check competency.  Check with insurance regarding cost/coverage.      Preventive Health Recommendations  Female Ages 26 - 39  Yearly exam:   See your health care provider every year in order to  Review health changes.   Discuss preventive care.    Review your medicines if you your doctor has prescribed any.    Until age 30: Get a Pap test every three years (more often if you have had an abnormal result).    After age 30: Talk to your doctor about whether you should have a Pap test every 3 years or have a Pap test with HPV screening every 5 years.   You do not need a Pap test if your uterus was removed (hysterectomy) and you have not had cancer.  You should be tested each year for STDs (sexually transmitted diseases), if you're at risk.   Talk to your provider about how often to have your cholesterol checked.  If you are at risk for diabetes, you should have a diabetes test (fasting glucose).  Shots: Get a flu shot each year. Get a tetanus shot every 10 years.   Nutrition:   Eat at least 5 servings of fruits and vegetables each day.  Eat whole-grain bread, whole-wheat pasta and brown rice instead of white grains and rice.  Get adequate Calcium and Vitamin D.     Lifestyle  Exercise at least 150 minutes a week (30 minutes a day, 5 days of the week). This will help you control your weight and prevent disease.  Limit alcohol to one drink per day.  No smoking.   Wear sunscreen to prevent skin cancer.  See your dentist every six months for an exam and cleaning.

## 2022-10-17 ENCOUNTER — OFFICE VISIT (OUTPATIENT)
Dept: FAMILY MEDICINE | Facility: OTHER | Age: 32
End: 2022-10-17
Attending: FAMILY MEDICINE
Payer: COMMERCIAL

## 2022-10-17 VITALS
OXYGEN SATURATION: 99 % | TEMPERATURE: 97.9 F | RESPIRATION RATE: 17 BRPM | SYSTOLIC BLOOD PRESSURE: 126 MMHG | DIASTOLIC BLOOD PRESSURE: 88 MMHG | HEART RATE: 68 BPM | BODY MASS INDEX: 32.62 KG/M2 | WEIGHT: 196 LBS

## 2022-10-17 DIAGNOSIS — Z13.220 LIPID SCREENING: ICD-10-CM

## 2022-10-17 DIAGNOSIS — D22.9 CHANGE IN MOLE: ICD-10-CM

## 2022-10-17 DIAGNOSIS — R74.8 ELEVATED ALKALINE PHOSPHATASE LEVEL: ICD-10-CM

## 2022-10-17 DIAGNOSIS — Z00.00 ROUTINE GENERAL MEDICAL EXAMINATION AT A HEALTH CARE FACILITY: Primary | ICD-10-CM

## 2022-10-17 DIAGNOSIS — R25.1 TREMOR: ICD-10-CM

## 2022-10-17 LAB
ALBUMIN SERPL BCG-MCNC: 4.7 G/DL (ref 3.5–5.2)
ALP SERPL-CCNC: 127 U/L (ref 35–104)
ALT SERPL W P-5'-P-CCNC: 13 U/L (ref 10–35)
ANION GAP SERPL CALCULATED.3IONS-SCNC: 8 MMOL/L (ref 7–15)
AST SERPL W P-5'-P-CCNC: 17 U/L (ref 10–35)
BASOPHILS # BLD AUTO: 0 10E3/UL (ref 0–0.2)
BASOPHILS NFR BLD AUTO: 0 %
BILIRUB SERPL-MCNC: 0.2 MG/DL
BUN SERPL-MCNC: 9.8 MG/DL (ref 6–20)
CALCIUM SERPL-MCNC: 9.3 MG/DL (ref 8.6–10)
CHLORIDE SERPL-SCNC: 101 MMOL/L (ref 98–107)
CHOLEST SERPL-MCNC: 208 MG/DL
CREAT SERPL-MCNC: 0.63 MG/DL (ref 0.51–0.95)
DEPRECATED HCO3 PLAS-SCNC: 28 MMOL/L (ref 22–29)
EOSINOPHIL # BLD AUTO: 0.2 10E3/UL (ref 0–0.7)
EOSINOPHIL NFR BLD AUTO: 2 %
ERYTHROCYTE [DISTWIDTH] IN BLOOD BY AUTOMATED COUNT: 12.8 % (ref 10–15)
GFR SERPL CREATININE-BSD FRML MDRD: >90 ML/MIN/1.73M2
GLUCOSE SERPL-MCNC: 91 MG/DL (ref 70–99)
HCT VFR BLD AUTO: 40.5 % (ref 35–47)
HDLC SERPL-MCNC: 53 MG/DL
HGB BLD-MCNC: 13.5 G/DL (ref 11.7–15.7)
IMM GRANULOCYTES # BLD: 0 10E3/UL
IMM GRANULOCYTES NFR BLD: 0 %
LDLC SERPL CALC-MCNC: 123 MG/DL
LYMPHOCYTES # BLD AUTO: 2.6 10E3/UL (ref 0.8–5.3)
LYMPHOCYTES NFR BLD AUTO: 32 %
MCH RBC QN AUTO: 29.7 PG (ref 26.5–33)
MCHC RBC AUTO-ENTMCNC: 33.3 G/DL (ref 31.5–36.5)
MCV RBC AUTO: 89 FL (ref 78–100)
MONOCYTES # BLD AUTO: 0.5 10E3/UL (ref 0–1.3)
MONOCYTES NFR BLD AUTO: 6 %
NEUTROPHILS # BLD AUTO: 4.9 10E3/UL (ref 1.6–8.3)
NEUTROPHILS NFR BLD AUTO: 60 %
NONHDLC SERPL-MCNC: 155 MG/DL
NRBC # BLD AUTO: 0 10E3/UL
NRBC BLD AUTO-RTO: 0 /100
PLATELET # BLD AUTO: 223 10E3/UL (ref 150–450)
POTASSIUM SERPL-SCNC: 3.6 MMOL/L (ref 3.4–5.3)
PROT SERPL-MCNC: 7.5 G/DL (ref 6.4–8.3)
RBC # BLD AUTO: 4.55 10E6/UL (ref 3.8–5.2)
SODIUM SERPL-SCNC: 137 MMOL/L (ref 136–145)
TRIGL SERPL-MCNC: 158 MG/DL
TSH SERPL DL<=0.005 MIU/L-ACNC: 2.14 UIU/ML (ref 0.3–4.2)
WBC # BLD AUTO: 8.2 10E3/UL (ref 4–11)

## 2022-10-17 PROCEDURE — 99395 PREV VISIT EST AGE 18-39: CPT | Performed by: FAMILY MEDICINE

## 2022-10-17 PROCEDURE — 36415 COLL VENOUS BLD VENIPUNCTURE: CPT | Performed by: FAMILY MEDICINE

## 2022-10-17 PROCEDURE — 80050 GENERAL HEALTH PANEL: CPT | Performed by: FAMILY MEDICINE

## 2022-10-17 PROCEDURE — 80061 LIPID PANEL: CPT | Performed by: FAMILY MEDICINE

## 2022-10-17 PROCEDURE — 82306 VITAMIN D 25 HYDROXY: CPT | Performed by: FAMILY MEDICINE

## 2022-10-17 RX ORDER — MULTIVITAMIN,THERAPEUTIC
1 TABLET ORAL DAILY
COMMUNITY

## 2022-10-17 NOTE — NURSING NOTE
"Chief Complaint   Patient presents with     Physical       Initial /88   Pulse 68   Temp 97.9  F (36.6  C) (Tympanic)   Resp 17   Wt 88.9 kg (196 lb)   SpO2 99%   BMI 32.62 kg/m   Estimated body mass index is 32.62 kg/m  as calculated from the following:    Height as of 8/12/21: 1.651 m (5' 5\").    Weight as of this encounter: 88.9 kg (196 lb).  Medication Reconciliation: complete  Licha Gilbert LPN  "

## 2022-10-17 NOTE — PROGRESS NOTES
SUBJECTIVE:   CC: Fawn is an 32 year old who presents for preventive health visit.         Healthy Habits:     Getting at least 3 servings of Calcium per day:  Yes    Bi-annual eye exam:  Yes    Dental care twice a year:  Yes    Sleep apnea or symptoms of sleep apnea:  None    Diet:  Regular (no restrictions)    Frequency of exercise:  None    Taking medications regularly:  Yes    Medication side effects:  None    PHQ-2 Total Score: 0    Additional concerns today:  No        Flu - plans to do with kids as a family.    Pap/hpv 2021 - due 2026.    Family history - CAD, hyperlipidemia.    No GDM.    Today's PHQ-2 Score:   PHQ-2 ( 1999 Pfizer) 10/17/2022   Q1: Little interest or pleasure in doing things 0   Q2: Feeling down, depressed or hopeless 0   PHQ-2 Score 0   Q1: Little interest or pleasure in doing things Not at all   Q2: Feeling down, depressed or hopeless Not at all   PHQ-2 Score 0       Abuse: Current or Past (Physical, Sexual or Emotional) - No  Do you feel safe in your environment? Yes    Moles  1 in left axilla - 1 year; round.  Multiple on back.  2 prior removed - benign.  Family history - possible precancerous - head, balding.  Asymptomatic.  Few significant burns.    Tremors in her right hand that are random -  Has occurred 2-3 times; resolves with rest; noted with movement/intention - using fork; not related to food/glucose, exertion.  First noted few months.  Duration- seconds.  Never anywhere other than right.  Right handed.  Aunt with tremors - mid 60s.  No other neuro history.  Caffeine - 1 cup per day.  Alcohol - 1 drink per week if that.  No recent trauma.      Social History     Tobacco Use     Smoking status: Never     Smokeless tobacco: Never   Substance Use Topics     Alcohol use: Not Currently     Comment: Socially     If you drink alcohol do you typically have >3 drinks per day or >7 drinks per week? Not applicable    Alcohol Use 10/17/2022   Prescreen: >3 drinks/day or >7 drinks/week?  Not Applicable   Prescreen: >3 drinks/day or >7 drinks/week? -   No flowsheet data found.    Reviewed orders with patient.  Reviewed health maintenance and updated orders accordingly - Yes  No current outpatient medications on file.     No current facility-administered medications for this visit.       Lab work is in process  Labs reviewed in EPIC    Breast Cancer Screening:  Any new diagnosis of family breast, ovarian, or bowel cancer? No    FHS-7: No flowsheet data found.    Patient under 40 years of age: Routine Mammogram Screening not recommended.   Pertinent mammograms are reviewed under the imaging tab.    History of abnormal Pap smear:   Last 3 Pap and HPV Results:   PAP / HPV Latest Ref Rng & Units 1/15/2021 3/6/2017 2014   PAP (Historical) - NIL NIL NIL   HPV16 NEG:Negative Negative - -   HPV18 NEG:Negative Negative - -   HRHPV NEG:Negative Negative - -       Reviewed and updated as needed this visit by clinical staff   Tobacco  Allergies  Meds   Med Hx  Surg Hx  Fam Hx  Soc Hx        Reviewed and updated as needed this visit by Provider                 Past Medical History:   Diagnosis Date     Contraceptive use      Infection due to  novel coronavirus 2021      Past Surgical History:   Procedure Laterality Date     BILATERAL > PE TUBES       Fractured Arm       Left and Right Bunionectomy       TONSILLECTOMY       [place] Teeth Extraction       OB History    Para Term  AB Living   3 3 3 0 0 3   SAB IAB Ectopic Multiple Live Births   0 0 0 0 3      # Outcome Date GA Lbr Matthew/2nd Weight Sex Delivery Anes PTL Lv   3 Term 21 39w1d 06:43 / 00:06 3.535 kg (7 lb 12.7 oz) M Vag-Spont None N JUAN CARLOS      Name: BLAINEMALE-MAXINE      Apgar1: 9  Apgar5: 9   2 Term 10/03/18 39w0d 05:30 / 00:11 3.3 kg (7 lb 4.4 oz) M Vag-Spont Local N JUAN CARLOS      Name: BLAINEBABY1 MAXINE      Apgar1: 8  Apgar5: 9   1 Term 16 40w0d 15:29 / 00:46 3.946 kg (8 lb 11.2 oz) M Vag-Spont Local N JUAN CARLOS       Apgar1: 9  Apgar5: 9       Review of Systems  CONSTITUTIONAL: NEGATIVE for fever, chills, change in weight  INTEGUMENTARU/SKIN: NEGATIVE for worrisome rashes, or lesions; moles as above  EYES: NEGATIVE for vision changes or irritation  ENT: NEGATIVE for ear, mouth and throat problems  RESP: NEGATIVE for significant cough or SOB  BREAST: NEGATIVE for masses, tenderness or discharge  CV: NEGATIVE for chest pain, palpitations or peripheral edema  GI: NEGATIVE for nausea, abdominal pain, heartburn, or change in bowel habits  : NEGATIVE for unusual urinary or vaginal symptoms. Periods are regular.  MUSCULOSKELETAL: NEGATIVE for significant arthralgias or myalgia  NEURO: NEGATIVE for weakness, dizziness or paresthesias; Tremor as above  PSYCHIATRIC: NEGATIVE for changes in mood or affect     OBJECTIVE:   /88   Pulse 68   Temp 97.9  F (36.6  C) (Tympanic)   Resp 17   Wt 88.9 kg (196 lb)   SpO2 99%   BMI 32.62 kg/m    Physical Exam  GENERAL: alert, no distress and over weight  EYES: Eyes grossly normal to inspection, PERRL and conjunctivae and sclerae normal  HENT: ear canals and TM's normal, nose and mouth without ulcers or lesions  NECK: no adenopathy, no asymmetry, masses, or scars and thyroid normal to palpation  RESP: lungs clear to auscultation - no rales, rhonchi or wheezes  BREAST: normal without masses, tenderness or nipple discharge and no palpable axillary masses or adenopathy  CV: regular rate and rhythm, normal S1 S2, no S3 or S4, no murmur, click or rub, no peripheral edema and peripheral pulses strong  ABDOMEN: soft, nontender, no hepatosplenomegaly, no masses and bowel sounds normal  MS: no gross musculoskeletal defects noted, no edema  SKIN: scattered moles on back - multiple, flat and raised, round, and irregular; 1 in left axilla pigmented skin tag  NEURO: Normal strength and tone, mentation intact and speech normal  PSYCH: mentation appears normal, affect normal/bright    Labs  "pending    ASSESSMENT/PLAN:       ICD-10-CM    1. Routine general medical examination at a health care facility  Z00.00       2. BMI 32.0-32.9,adult  Z68.32       3. Tremor  R25.1       4. Change in mole  D22.9         Will call with lab results.    Pap/hpv  - due to .    Flu shot - to be done with family.    Monitor tremor - journal - is it both hands or just one - at rest, with hands up/out, with activity?  Consider imaging - brain MRI.    Moles - routine sunscreen.   Given multiple moles - dermatology referral.    Compression stockings for varicose veins.  Consider ultrasound to check competency.  Check with insurance regarding cost/coverage.      COUNSELING:  Reviewed preventive health counseling, as reflected in patient instructions       Regular exercise       Healthy diet/nutrition       Vision screening       Hearing screening       Immunizations    Will get flu shot with family         Family planning       Safe sex practices/STD prevention       Colorectal Cancer Screening       Consider Hep C screening for all patients one time for ages 18-79 years       HIV screeninx in teen years, 1x in adult years, and at intervals if high risk       Advance Care Planning    Estimated body mass index is 32.62 kg/m  as calculated from the following:    Height as of 21: 1.651 m (5' 5\").    Weight as of this encounter: 88.9 kg (196 lb).    Weight management plan: Discussed healthy diet and exercise guidelines    She reports that she has never smoked. She has never used smokeless tobacco.      Counseling Resources:  ATP IV Guidelines  Pooled Cohorts Equation Calculator  Breast Cancer Risk Calculator  BRCA-Related Cancer Risk Assessment: FHS-7 Tool  FRAX Risk Assessment  ICSI Preventive Guidelines  Dietary Guidelines for Americans, 2010  USDA's MyPlate  ASA Prophylaxis  Lung CA Screening    Sandi Long MD  [facility] [place]  "

## 2022-10-18 LAB — DEPRECATED CALCIDIOL+CALCIFEROL SERPL-MC: 28 UG/L (ref 20–75)

## 2022-10-31 ENCOUNTER — TRANSFERRED RECORDS (OUTPATIENT)
Dept: HEALTH INFORMATION MANAGEMENT | Facility: CLINIC | Age: 32
End: 2022-10-31

## 2022-10-31 ENCOUNTER — ALLIED HEALTH/NURSE VISIT (OUTPATIENT)
Dept: FAMILY MEDICINE | Facility: OTHER | Age: 32
End: 2022-10-31
Attending: FAMILY MEDICINE
Payer: COMMERCIAL

## 2022-10-31 DIAGNOSIS — Z09 NEED FOR IMMUNIZATION FOLLOW-UP: Primary | ICD-10-CM

## 2022-10-31 PROCEDURE — 90686 IIV4 VACC NO PRSV 0.5 ML IM: CPT

## 2022-10-31 PROCEDURE — 90471 IMMUNIZATION ADMIN: CPT

## 2023-12-09 ENCOUNTER — HEALTH MAINTENANCE LETTER (OUTPATIENT)
Age: 33
End: 2023-12-09

## 2024-06-01 SDOH — HEALTH STABILITY: PHYSICAL HEALTH: ON AVERAGE, HOW MANY DAYS PER WEEK DO YOU ENGAGE IN MODERATE TO STRENUOUS EXERCISE (LIKE A BRISK WALK)?: 3 DAYS

## 2024-06-01 SDOH — HEALTH STABILITY: PHYSICAL HEALTH: ON AVERAGE, HOW MANY MINUTES DO YOU ENGAGE IN EXERCISE AT THIS LEVEL?: 30 MIN

## 2024-06-01 ASSESSMENT — SOCIAL DETERMINANTS OF HEALTH (SDOH): HOW OFTEN DO YOU GET TOGETHER WITH FRIENDS OR RELATIVES?: ONCE A WEEK

## 2024-06-05 NOTE — PATIENT INSTRUCTIONS
"  Will notify of lab results.  Referral for varicose veins to [facility] - vascular surgery.  Continue with healthy diet, calorie counting, intermittent fasting, exercise.  Add weight/resistance training twice per week.  Pap/hpv 2021 - due 2026.  Consider pelvic ultrasound if menses becoming more heavy, irregular, painful.  Care Advice   This is general advice we often give to help people stay healthy. Your care team may have specific advice just for you. Please talk to your care team about your own preventive care needs.  Lifestyle  Exercise at least 150 minutes each week (30 minutes a day, 5 days a week).  Do muscle strengthening activities 2 days a week. These help control your weight and prevent disease.  No smoking.  Wear sunscreen to prevent skin cancer.  Have your home tested for radon every 2 to 5 years. Radon is a colorless, odorless gas that can harm your lungs. To learn more, go to www.health.[facility].[place].us and search for \"Radon in Homes.\"  Keep guns unloaded and locked up in a safe place like a safe or gun vault, or, use a gun lock and hide the keys. Always lock away bullets separately. To learn more, visit [employer].gov and search for \"safe gun storage.\"  Nutrition  Eat 5 or more servings of fruits and vegetables each day.  Try wheat bread, brown rice and whole grain pasta (instead of white bread, rice, and pasta).  Get enough calcium and vitamin D. Check the label on foods and aim for 100% of the RDA (recommended daily allowance).  Regular exams  Have a dental exam and cleaning every 6 months.  See your health care team every year to talk about:  Any changes in your health.  Any medicines your care team has prescribed.  Preventive care, family planning, and ways to prevent chronic diseases.  Shots (vaccines)   HPV shots (up to age 26), if you've never had them before.  Hepatitis B shots (up to age 59), if you've never had them before.  COVID-19 shot: Get this shot when it's due.  Flu shot: Get a flu shot every " year.  Tetanus shot: Get a tetanus shot every 10 years.  Pneumococcal, hepatitis A, and RSV shots: Ask your care team if you need these based on your risk.  Shingles shot (for age 50 and up).  General health tests  Diabetes screening:  Starting at age 35, Get screened for diabetes at least every 3 years.  If you are younger than age 35, ask your care team if you should be screened for diabetes.  Cholesterol test: At age 39, start having a cholesterol test every 5 years, or more often if advised.  Bone density scan (DEXA): At age 50, ask your care team if you should have this scan for osteoporosis (brittle bones).  Hepatitis C: Get tested at least once in your life.  Abdominal aortic aneurysm screening: Talk to your doctor about having this screening if you:  Have ever smoked; and  Are biologically male; and  Are between the ages of 65 and 75.  STIs (sexually transmitted infections)  Before age 24: Ask your care team if you should be screened for STIs.  After age 24: Get screened for STIs if you're at risk. You are at risk for STIs (including HIV) if:  You are sexually active with more than one person.  You don't use condoms every time.  You or a partner was diagnosed with a sexually transmitted infection.  If you are at risk for HIV, ask about PrEP medicine to prevent HIV.  Get tested for HIV at least once in your life, whether you are at risk for HIV or not.  Cancer screening tests  Cervical cancer screening: If you have a cervix, begin getting regular cervical cancer screening tests at age 21. Most people who have regular screenings with normal results can stop after age 65. Talk about this with your provider.  Breast cancer scan (mammogram): If you've ever had breasts, begin having regular mammograms starting at age 40. This is a scan to check for breast cancer.  Colon cancer screening: It is important to start screening for colon cancer at age 45.  Have a colonoscopy test every 10 years (or more often if you're  at risk) Or, ask your provider about stool tests like a FIT test every year or Cologuard test every 3 years.  To learn more about your testing options, visit: www.[employer]/665179.pdf.  For help making a decision, visit: jean/um37631.  Prostate cancer screening test: If you have a prostate and are age 55 to 69, ask your provider if you would benefit from a yearly prostate cancer screening test.  Lung cancer screening: If you are a current or former smoker age 50 to 80, ask your care team if ongoing lung cancer screenings are right for you.  For informational purposes only. Not to replace the advice of your health care provider. Copyright   2023 [facility]. All rights reserved. Clinically reviewed by the [facility]. FTBpro 142486 - REV 04/24.    Substance Use Disorder: Care Instructions  Overview     You can improve your life and health by stopping your use of alcohol or drugs. When you don't drink or use drugs, you may feel and sleep better. You may get along better with your family, friends, and coworkers. There are medicines and programs that can help with substance use disorder.  How can you care for yourself at home?  Here are some ways to help you stay sober and prevent relapse.  If you have been given medicine to help keep you sober or reduce your cravings, be sure to take it exactly as prescribed.  Talk to your doctor about programs that can help you stop using drugs or drinking alcohol.  Do not keep alcohol or drugs in your home.  Plan ahead. Think about what you'll say if other people ask you to drink or use drugs. Try not to spend time with people who drink or use drugs.  Use the time and money spent on drinking or drugs to do something that's important to you.  Preventing a relapse  Have a plan to deal with relapse. Learn to recognize changes in your thinking that lead you to drink or use drugs. Get help before you start to drink or use drugs again.  Try to  stay away from situations, friends, or places that may lead you to drink or use drugs.  If you feel the need to drink alcohol or use drugs again, seek help right away. Call a trusted friend or family member. Some people get support from organizations such as Narcotics Anonymous or [employer] or from treatment facilities.  If you relapse, get help as soon as you can. Some people make a plan with another person that outlines what they want that person to do for them if they relapse. The plan usually includes how to handle the relapse and who to notify in case of relapse.  Don't give up. Remember that a relapse doesn't mean that you have failed. Use the experience to learn the triggers that lead you to drink or use drugs. Then quit again. Recovery is a lifelong process. Many people have several relapses before they are able to quit for good.  Follow-up care is a key part of your treatment and safety. Be sure to make and go to all appointments, and call your doctor if you are having problems. It's also a good idea to know your test results and keep a list of the medicines you take.  When should you call for help?   Call 151  anytime you think you may need emergency care. For example, call if you or someone else:    Has overdosed or has withdrawal signs. Be sure to tell the emergency workers that you are or someone else is using or trying to quit using drugs. Overdose or withdrawal signs may include:  Losing consciousness.  Seizure.  Seeing or hearing things that aren't there (hallucinations).     Is thinking or talking about suicide or harming others.   Where to get help 24 hours a day, 7 days a week   If you or someone you know talks about suicide, self-harm, a mental health crisis, a substance use crisis, or any other kind of emotional distress, get help right away. You can:    Call the Suicide and Crisis Lifeline at 036.     Call 7-374-463-TALK (1-363.283.9909).     Text HOME to 900620 to access the Crisis Text  "Line.   Consider saving these numbers in your phone.  Go to [employer] for more information or to chat online.  Call your doctor now or seek immediate medical care if:    You are having withdrawal symptoms. These may include nausea or vomiting, sweating, shakiness, and anxiety.   Watch closely for changes in your health, and be sure to contact your doctor if:    You have a relapse.     You need more help or support to stop.   Where can you learn more?  Go to https://www.[employer].net/patiented  Enter H573 in the search box to learn more about \"Substance Use Disorder: Care Instructions.\"  Current as of: November 15, 2023               Content Version: 14.0    0641-9905 [employer].   Care instructions adapted under license by your healthcare professional. If you have questions about a medical condition or this instruction, always ask your healthcare professional. [employer] disclaims any warranty or liability for your use of this information.      "

## 2024-06-06 ENCOUNTER — OFFICE VISIT (OUTPATIENT)
Dept: FAMILY MEDICINE | Facility: OTHER | Age: 34
End: 2024-06-06
Attending: FAMILY MEDICINE
Payer: COMMERCIAL

## 2024-06-06 VITALS
SYSTOLIC BLOOD PRESSURE: 126 MMHG | WEIGHT: 178.3 LBS | HEIGHT: 65 IN | BODY MASS INDEX: 29.71 KG/M2 | HEART RATE: 79 BPM | OXYGEN SATURATION: 100 % | DIASTOLIC BLOOD PRESSURE: 84 MMHG | TEMPERATURE: 98.5 F

## 2024-06-06 DIAGNOSIS — Z00.00 ROUTINE GENERAL MEDICAL EXAMINATION AT A HEALTH CARE FACILITY: Primary | ICD-10-CM

## 2024-06-06 DIAGNOSIS — E55.9 VITAMIN D DEFICIENCY: ICD-10-CM

## 2024-06-06 DIAGNOSIS — Z13.1 SCREENING FOR DIABETES MELLITUS: ICD-10-CM

## 2024-06-06 DIAGNOSIS — N92.6 IRREGULAR MENSES: ICD-10-CM

## 2024-06-06 DIAGNOSIS — I83.93 VARICOSE VEINS OF BOTH LOWER EXTREMITIES, UNSPECIFIED WHETHER COMPLICATED: ICD-10-CM

## 2024-06-06 DIAGNOSIS — Z13.220 LIPID SCREENING: ICD-10-CM

## 2024-06-06 LAB
BASOPHILS # BLD AUTO: 0 10E3/UL (ref 0–0.2)
BASOPHILS NFR BLD AUTO: 0 %
CHOLEST SERPL-MCNC: 195 MG/DL
EOSINOPHIL # BLD AUTO: 0.1 10E3/UL (ref 0–0.7)
EOSINOPHIL NFR BLD AUTO: 1 %
ERYTHROCYTE [DISTWIDTH] IN BLOOD BY AUTOMATED COUNT: 12.6 % (ref 10–15)
FASTING STATUS PATIENT QL REPORTED: YES
FASTING STATUS PATIENT QL REPORTED: YES
GLUCOSE SERPL-MCNC: 85 MG/DL (ref 70–99)
HCT VFR BLD AUTO: 38.6 % (ref 35–47)
HDLC SERPL-MCNC: 53 MG/DL
HGB BLD-MCNC: 13.1 G/DL (ref 11.7–15.7)
IMM GRANULOCYTES # BLD: 0 10E3/UL
IMM GRANULOCYTES NFR BLD: 0 %
LDLC SERPL CALC-MCNC: 125 MG/DL
LYMPHOCYTES # BLD AUTO: 2.1 10E3/UL (ref 0.8–5.3)
LYMPHOCYTES NFR BLD AUTO: 35 %
MCH RBC QN AUTO: 30.5 PG (ref 26.5–33)
MCHC RBC AUTO-ENTMCNC: 33.9 G/DL (ref 31.5–36.5)
MCV RBC AUTO: 90 FL (ref 78–100)
MONOCYTES # BLD AUTO: 0.3 10E3/UL (ref 0–1.3)
MONOCYTES NFR BLD AUTO: 5 %
NEUTROPHILS # BLD AUTO: 3.5 10E3/UL (ref 1.6–8.3)
NEUTROPHILS NFR BLD AUTO: 58 %
NONHDLC SERPL-MCNC: 142 MG/DL
NRBC # BLD AUTO: 0 10E3/UL
NRBC BLD AUTO-RTO: 0 /100
PLATELET # BLD AUTO: 220 10E3/UL (ref 150–450)
RBC # BLD AUTO: 4.29 10E6/UL (ref 3.8–5.2)
TRIGL SERPL-MCNC: 87 MG/DL
TSH SERPL DL<=0.005 MIU/L-ACNC: 2.42 UIU/ML (ref 0.3–4.2)
WBC # BLD AUTO: 6 10E3/UL (ref 4–11)

## 2024-06-06 PROCEDURE — 36415 COLL VENOUS BLD VENIPUNCTURE: CPT | Performed by: FAMILY MEDICINE

## 2024-06-06 PROCEDURE — 85025 COMPLETE CBC W/AUTO DIFF WBC: CPT | Performed by: FAMILY MEDICINE

## 2024-06-06 PROCEDURE — 99213 OFFICE O/P EST LOW 20 MIN: CPT | Mod: 25 | Performed by: FAMILY MEDICINE

## 2024-06-06 PROCEDURE — 99395 PREV VISIT EST AGE 18-39: CPT | Performed by: FAMILY MEDICINE

## 2024-06-06 PROCEDURE — 80061 LIPID PANEL: CPT | Performed by: FAMILY MEDICINE

## 2024-06-06 PROCEDURE — 84443 ASSAY THYROID STIM HORMONE: CPT | Performed by: FAMILY MEDICINE

## 2024-06-06 PROCEDURE — 82306 VITAMIN D 25 HYDROXY: CPT | Performed by: FAMILY MEDICINE

## 2024-06-06 PROCEDURE — 82947 ASSAY GLUCOSE BLOOD QUANT: CPT | Performed by: FAMILY MEDICINE

## 2024-06-06 ASSESSMENT — ANXIETY QUESTIONNAIRES
GAD7 TOTAL SCORE: 2
7. FEELING AFRAID AS IF SOMETHING AWFUL MIGHT HAPPEN: NOT AT ALL
4. TROUBLE RELAXING: NOT AT ALL
5. BEING SO RESTLESS THAT IT IS HARD TO SIT STILL: NOT AT ALL
6. BECOMING EASILY ANNOYED OR IRRITABLE: MORE THAN HALF THE DAYS
1. FEELING NERVOUS, ANXIOUS, OR ON EDGE: NOT AT ALL
GAD7 TOTAL SCORE: 2
2. NOT BEING ABLE TO STOP OR CONTROL WORRYING: NOT AT ALL
GAD7 TOTAL SCORE: 2
7. FEELING AFRAID AS IF SOMETHING AWFUL MIGHT HAPPEN: NOT AT ALL
3. WORRYING TOO MUCH ABOUT DIFFERENT THINGS: NOT AT ALL

## 2024-06-06 ASSESSMENT — PATIENT HEALTH QUESTIONNAIRE - PHQ9
SUM OF ALL RESPONSES TO PHQ QUESTIONS 1-9: 1
SUM OF ALL RESPONSES TO PHQ QUESTIONS 1-9: 1

## 2024-06-06 ASSESSMENT — PAIN SCALES - GENERAL: PAINLEVEL: NO PAIN (0)

## 2024-06-06 NOTE — PROGRESS NOTES
"Preventive Care Visit  [facility]  Sandi Long MD, Family Medicine  Jun 6, 2024      Assessment & Plan     Routine general medical examination at a health care facility  Preventative cares reviewed.  Counseled on diet, exercise, weight management.  Doing well with intermittent fasting, calorie counting, and cardio.  Encouraged resistance training as well.    Irregular menses  [place] cycle, but average days of menses.  Update CBC, TSH.  Consider ultrasound next to evaluate uterine lining, presence of fibroids, etc.  - CBC with platelets and differential; Future  - TSH with free T4 reflex; Future    Lipid screening  - Lipid Profile (Chol, Trig, HDL, LDL calc); Future    Screening for diabetes mellitus  - Glucose; Future    Vitamin D deficiency  - Vitamin D Deficiency; Future    Varicose veins of both lower extremities, unspecified whether complicated  Left more than right.  Minimal symptoms.  Interested in consult - would consider surgery for cosmetic reasons.  Defer ultrasound in this case first, and have consult to see if covered/affordable to move forward.  - Vascular Surgery Referral; Future        BMI  Estimated body mass index is 29.67 kg/m  as calculated from the following:    Height as of this encounter: 1.651 m (5' 5\").    Weight as of this encounter: 80.9 kg (178 lb 4.8 oz).   Weight management plan: Discussed healthy diet and exercise guidelines    Counseling  Appropriate preventive services were discussed with this patient, including applicable screening as appropriate for fall prevention, nutrition, physical activity, Tobacco-use cessation, weight loss and cognition.  Checklist reviewing preventive services available has been given to the patient.  Reviewed patient's diet, addressing concerns and/or questions.   She is at risk for lack of exercise and has been provided with information to increase physical activity for the benefit of her well-being.   She is at risk for psychosocial " distress and has been provided with information to reduce risk.       See Patient Instructions    Return in about 53 weeks (around 6/12/2025) for Annual Wellness Visit.    Milagros Augustine is a 33 year old, presenting for the following:  Physical      Health Care Directive  Patient does not have a Health Care Directive or Living Will: Discussed advance care planning with patient; however, patient declined at this time.    HPI    COVID booster - defers    Pap/hpv 2021 - due 2026.    BMI 29  Down   Heaviest non pregnant weight 2022 - 202 pounds.  Weighs self every Friday. 174 pounds.  Goal 150.  Graduated HS at 140.  Calorie counting since 2/2024.  Working out.  Walking regularly - dog - 2 miles.  Mile daily to get heart rate.  Intermittent fasting - 11-7pm.    Vit D 28 in 2022.  Mild elevation lipids with  then.    Some winter seasonal affective.  Taking vitamin D.    Menses - more cramping.  Off birth control.  Irregular, every 3-4 weeks.  5 days.  Prior OCP - menses lasted 3 days.  3/1, 3/24, 4/19, 5/11   vasectomy.    Mom - uterine mass - benign - s/p hysterectomy 40 s.    Varicose veines - left - people commend often.  Minimal symptoms.      6/1/2024   General Health   How would you rate your overall physical health? Excellent   Feel stress (tense, anxious, or unable to sleep) Only a little   (!) STRESS CONCERN      6/1/2024   Nutrition   Three or more servings of calcium each day? Yes   Diet: Regular (no restrictions)   How many servings of fruit and vegetables per day? (!) 0-1   How many sweetened beverages each day? 0-1         6/1/2024   Exercise   Days per week of moderate/strenous exercise 3 days   Average minutes spent exercising at this level 30 min         6/1/2024   Social Factors   Frequency of gathering with friends or relatives Once a week   Worry food won't last until get money to buy more No   Food not last or not have enough money for food? No   Do you have housing?  Yes   Are you  worried about losing your housing? No   Lack of transportation? No   Unable to get utilities (heat,electricity)? No         2024   Dental   Dentist two times every year? Yes         2024   TB Screening   Were you born outside of the [place]? No       Today's PHQ-9 Score:       2024    10:03 AM   PHQ-9 SCORE   PHQ-9 Total Score MyChart 1 (Minimal depression)   PHQ-9 Total Score 1         2024   Substance Use   Alcohol more than 3/day or more than 7/wk No   Do you use any other substances recreationally? No    (!) ALCOHOL     Social History     Tobacco Use    Smoking status: Never    Smokeless tobacco: Never   Substance Use Topics    Alcohol use: Not Currently     Comment: Socially    Drug use: No          Mammogram Screening - Patient under 40 years of age: Routine Mammogram Screening not recommended.         2024   STI Screening   New sexual partner(s) since last STI/HIV test? No     History of abnormal Pap smear: No - age 30- 64 PAP with HPV every 5 years recommended        Latest Ref Rng & Units 1/15/2021     2:09 PM 3/6/2017     9:27 AM 2014    12:00 AM   PAP / HPV   PAP (Historical)  NIL  NIL  NIL    HPV 16 DNA NEG^Negative Negative      HPV 18 DNA NEG^Negative Negative      Other HR HPV NEG^Negative Negative              2024   Contraception/Family Planning   Questions about contraception or family planning No        Reviewed and updated as needed this visit by Provider                    Past Medical History:   Diagnosis Date    Contraceptive use     Infection due to 2019 novel coronavirus 2021     Past Surgical History:   Procedure Laterality Date    BILATERAL > PE TUBES      Fractured Arm      Left and Right Bunionectomy      TONSILLECTOMY      [place] Teeth Extraction       OB History    Para Term  AB Living   3 3 3 0 0 3   SAB IAB Ectopic Multiple Live Births   0 0 0 0 3      # Outcome Date GA Lbr Matthew/2nd Weight Sex Type Anes PTL Lv   3 Term 21 39w1d 06:43 /  "00:06 3.535 kg (7 lb 12.7 oz) M Vag-Spont None N JUAN CARLOS      Name: BLAINE,MALE-MAXINE      Apgar1: 9  Apgar5: 9   2 Term 10/03/18 39w0d 05:30 / 00:11 3.3 kg (7 lb 4.4 oz) M Vag-Spont Local N JUAN CARLOS      Name: [email or web address] MAXINE      Apgar1: 8  Apgar5: 9   1 Term 01/09/16 40w0d 15:29 / 00:46 3.946 kg (8 lb 11.2 oz) M Vag-Spont Local N JUAN CARLOS      Apgar1: 9  Apgar5: 9         Review of Systems  Constitutional, HEENT, cardiovascular, pulmonary, gi and gu systems are negative, except as otherwise noted.     Objective    Exam  /84 (BP Location: Right arm, Patient Position: Sitting, Cuff Size: Adult Regular)   Pulse 79   Temp 98.5  F (36.9  C) (Tympanic)   Ht 1.651 m (5' 5\")   Wt 80.9 kg (178 lb 4.8 oz)   LMP 05/11/2024   SpO2 100%   BMI 29.67 kg/m     Estimated body mass index is 29.67 kg/m  as calculated from the following:    Height as of this encounter: 1.651 m (5' 5\").    Weight as of this encounter: 80.9 kg (178 lb 4.8 oz).    Physical Exam  GENERAL: alert and no distress  EYES: Eyes grossly normal to inspection, PERRL and conjunctivae and sclerae normal  HENT: ear canals and TM's normal, nose and mouth without ulcers or lesions  NECK: no adenopathy, no asymmetry, masses, or scars  RESP: lungs clear to auscultation - no rales, rhonchi or wheezes  BREAST: normal without masses, tenderness or nipple discharge and no palpable axillary masses or adenopathy  CV: regular rate and rhythm, normal S1 S2, no S3 or S4, no murmur, click or rub, no peripheral edema  ABDOMEN: soft, nontender, no hepatosplenomegaly, no masses and bowel sounds normal   (female): normal female external genitalia, normal urethral meatus , normal vaginal mucosa, and bimanual exam without masses or tenderness  MS: moderate varicose veins left, mild right, and no edema  SKIN: no suspicious lesions or rashes  NEURO: Normal strength and tone, mentation intact and speech normal  PSYCH: mentation appears normal, affect normal/bright        Signed " Electronically by: Sandi Long MD    Answers submitted by the patient for this visit:  Adult Preventative Visit on 6/6/2024 10:15 AM with Sandi Long  Patient Health Questionnaire (Submitted on 6/6/2024)  PHQ9 TOTAL SCORE: 1  MIRANDA-7 (Submitted on 6/6/2024)  MIRANDA 7 TOTAL SCORE: 2

## 2024-06-07 LAB — VIT D+METAB SERPL-MCNC: 60 NG/ML (ref 20–50)

## 2025-07-13 ENCOUNTER — HEALTH MAINTENANCE LETTER (OUTPATIENT)
Age: 35
End: 2025-07-13

## 2025-07-24 NOTE — PROGRESS NOTES
"  Assessment & Plan     Left knee pain, unspecified chronicity  Location at patellar tendon, but also worse with hip flexion; suspect gait related - asymmetry.  Referral to PT.  Ice, NSAIDs, stretching, symptomatic cares, and avoiding exercise that triggers pain.  More walk or interval jog for now.  - Physical Therapy  Referral; Future    Patellar tendinitis of left knee  As above.  - Physical Therapy  Referral; Future    BMI  Estimated body mass index is 29.67 kg/m  as calculated from the following:    Height as of this encounter: 1.651 m (5' 5\").    Weight as of this encounter: 80.9 kg (178 lb 4.8 oz).       Follow-up  No follow-ups on file.    Milagros Augustine is a 34 year old, presenting for the following health issues:  Knee Pain    History of Present Illness       Reason for visit:  Left knee pain  Symptom onset:  3-4 weeks ago  Symptoms include:  Knee pain at the front on my knee  Symptom intensity:  Moderate  Symptom progression:  Staying the same  Had these symptoms before:  No  What makes it worse:  Running or extended physical use (but not walking). I rested for 2 weeks, felt better where I could kneel. Went for run this morning and knee is back to aching/hurting.  What makes it better:  I ve been icing it and taking advil if it really bothers me.   She is taking medications regularly.        Pain History:  When did you first notice your pain?  7/4/25   Have you seen anyone else for your pain? No  How has your pain affected your ability to work? 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 Left knee    1/2 marathon training - [place] 10/2025.  7/4 did run - felt fine - that night - bent down - knee sore - progressing over that week.  Ran 2 more times that week.  Stopped running x 1-2 weeks.  No apin with walking.  Sometimes hurts to do stairs up/down - worse up.   Worse with hip flexion.  Icing, " "advil.  No swelling.  No prior injuries.    No PT or chiro.  Pain is front.   Can't kneel on it.   No catching, locking.    Review of Systems  Constitutional, HEENT, cardiovascular, pulmonary, gi and gu systems are negative, except as otherwise noted.      Objective    /75 (BP Location: Left arm, Patient Position: Sitting, Cuff Size: Adult Large)   Pulse 75   Temp 98.1  F (36.7  C) (Tympanic)   Resp 16   Ht 1.651 m (5' 5\")   Wt 80.9 kg (178 lb 4.8 oz)   LMP 07/09/2025   SpO2 100%   BMI 29.67 kg/m    Body mass index is 29.67 kg/m .  Physical Exam   GENERAL: alert and no distress  MS: normal muscle tone, no edema, and tenderness to palpation mild left patellar tendon; full AROM; non tender joint lines; no edema; no overlying skin changes; normal AROM hip, ankle, knee; stable ligaments; negative mcmurragys  SKIN: no suspicious lesions or rashes  NEURO: Normal strength and tone, mentation intact and speech normal  PSYCH: mentation appears normal, affect normal/bright        The longitudinal plan of care for the diagnosis(es)/condition(s) as documented were addressed during this visit. Due to the added complexity in care, I will continue to support [facility] in the subsequent management and with ongoing continuity of care.    Signed Electronically by: Sandi Long MD    "

## 2025-07-30 ENCOUNTER — OFFICE VISIT (OUTPATIENT)
Dept: FAMILY MEDICINE | Facility: OTHER | Age: 35
End: 2025-07-30
Attending: FAMILY MEDICINE
Payer: COMMERCIAL

## 2025-07-30 VITALS
DIASTOLIC BLOOD PRESSURE: 75 MMHG | HEART RATE: 75 BPM | HEIGHT: 65 IN | TEMPERATURE: 98.1 F | BODY MASS INDEX: 29.71 KG/M2 | WEIGHT: 178.3 LBS | SYSTOLIC BLOOD PRESSURE: 124 MMHG | RESPIRATION RATE: 16 BRPM | OXYGEN SATURATION: 100 %

## 2025-07-30 DIAGNOSIS — M25.562 LEFT KNEE PAIN, UNSPECIFIED CHRONICITY: Primary | ICD-10-CM

## 2025-07-30 DIAGNOSIS — M76.52 PATELLAR TENDINITIS OF LEFT KNEE: ICD-10-CM

## 2025-07-30 ASSESSMENT — ANXIETY QUESTIONNAIRES
GAD7 TOTAL SCORE: 1
6. BECOMING EASILY ANNOYED OR IRRITABLE: NOT AT ALL
1. FEELING NERVOUS, ANXIOUS, OR ON EDGE: NOT AT ALL
GAD7 TOTAL SCORE: 1
7. FEELING AFRAID AS IF SOMETHING AWFUL MIGHT HAPPEN: SEVERAL DAYS
3. WORRYING TOO MUCH ABOUT DIFFERENT THINGS: NOT AT ALL
8. IF YOU CHECKED OFF ANY PROBLEMS, HOW DIFFICULT HAVE THESE MADE IT FOR YOU TO DO YOUR WORK, TAKE CARE OF THINGS AT HOME, OR GET ALONG WITH OTHER PEOPLE?: NOT DIFFICULT AT ALL
IF YOU CHECKED OFF ANY PROBLEMS ON THIS QUESTIONNAIRE, HOW DIFFICULT HAVE THESE PROBLEMS MADE IT FOR YOU TO DO YOUR WORK, TAKE CARE OF THINGS AT HOME, OR GET ALONG WITH OTHER PEOPLE: NOT DIFFICULT AT ALL
5. BEING SO RESTLESS THAT IT IS HARD TO SIT STILL: NOT AT ALL
GAD7 TOTAL SCORE: 1
2. NOT BEING ABLE TO STOP OR CONTROL WORRYING: NOT AT ALL
4. TROUBLE RELAXING: NOT AT ALL
7. FEELING AFRAID AS IF SOMETHING AWFUL MIGHT HAPPEN: SEVERAL DAYS

## 2025-07-30 ASSESSMENT — PAIN SCALES - GENERAL: PAINLEVEL_OUTOF10: MODERATE PAIN (6)

## 2025-07-30 ASSESSMENT — PATIENT HEALTH QUESTIONNAIRE - PHQ9
SUM OF ALL RESPONSES TO PHQ QUESTIONS 1-9: 1
10. IF YOU CHECKED OFF ANY PROBLEMS, HOW DIFFICULT HAVE THESE PROBLEMS MADE IT FOR YOU TO DO YOUR WORK, TAKE CARE OF THINGS AT HOME, OR GET ALONG WITH OTHER PEOPLE: NOT DIFFICULT AT ALL
SUM OF ALL RESPONSES TO PHQ QUESTIONS 1-9: 1

## 2025-07-30 NOTE — PATIENT INSTRUCTIONS
Ice, Ibuprofen (max 800 mg 3 times per day with food), stretch (hips, knees, ankles, low back).  Referral to PT - see home exercises to start.  Listen to your body - if it hurts - stop.

## 2025-08-26 ENCOUNTER — THERAPY VISIT (OUTPATIENT)
Dept: PHYSICAL THERAPY | Facility: HOSPITAL | Age: 35
End: 2025-08-26
Attending: FAMILY MEDICINE
Payer: COMMERCIAL

## 2025-08-26 DIAGNOSIS — M76.52 PATELLAR TENDINITIS OF LEFT KNEE: Primary | ICD-10-CM

## 2025-08-26 PROCEDURE — 999N000104 HC STATISTIC NO CHARGE

## 2025-08-26 PROCEDURE — 97161 PT EVAL LOW COMPLEX 20 MIN: CPT | Mod: GP

## 2025-08-26 PROCEDURE — 97110 THERAPEUTIC EXERCISES: CPT | Mod: GP

## 2025-08-26 ASSESSMENT — ACTIVITIES OF DAILY LIVING (ADL)
GO DOWN STAIRS: ACTIVITY IS NOT DIFFICULT
HOW_WOULD_YOU_RATE_THE_CURRENT_FUNCTION_OF_YOUR_KNEE_DURING_YOUR_USUAL_DAILY_ACTIVITIES_ON_A_SCALE_FROM_0_TO_100_WITH_100_BEING_YOUR_LEVEL_OF_KNEE_FUNCTION_PRIOR_TO_YOUR_INJURY_AND_0_BEING_THE_INABILITY_TO_PERFORM_ANY_OF_YOUR_USUAL_DAILY_ACTIVITIES?: 90
WALK: ACTIVITY IS NOT DIFFICULT
KNEE_ACTIVITY_OF_DAILY_LIVING_SCORE: 94.29
GO UP STAIRS: ACTIVITY IS NOT DIFFICULT
PAIN: THE SYMPTOM AFFECTS MY ACTIVITY SLIGHTLY
HOW_WOULD_YOU_RATE_THE_OVERALL_FUNCTION_OF_YOUR_KNEE_DURING_YOUR_USUAL_DAILY_ACTIVITIES?: NEARLY NORMAL
SWELLING: I DO NOT HAVE THE SYMPTOM
KNEEL ON THE FRONT OF YOUR KNEE: ACTIVITY IS NOT DIFFICULT
GIVING WAY, BUCKLING OR SHIFTING OF KNEE: I DO NOT HAVE THE SYMPTOM
KNEE_ACTIVITY_OF_DAILY_LIVING_SUM: 66
STAND: ACTIVITY IS NOT DIFFICULT
RAW_SCORE: 66
AS_A_RESULT_OF_YOUR_KNEE_INJURY,_HOW_WOULD_YOU_RATE_YOUR_CURRENT_LEVEL_OF_DAILY_ACTIVITY?: NEARLY NORMAL
SQUAT: ACTIVITY IS NOT DIFFICULT
RISE FROM A CHAIR: ACTIVITY IS NOT DIFFICULT
SIT WITH YOUR KNEE BENT: ACTIVITY IS NOT DIFFICULT
LIMPING: I DO NOT HAVE THE SYMPTOM
STIFFNESS: I DO NOT HAVE THE SYMPTOM
WEAKNESS: THE SYMPTOM AFFECTS MY ACTIVITY SLIGHTLY

## 2025-09-02 ENCOUNTER — THERAPY VISIT (OUTPATIENT)
Dept: PHYSICAL THERAPY | Facility: HOSPITAL | Age: 35
End: 2025-09-02
Attending: FAMILY MEDICINE
Payer: COMMERCIAL

## 2025-09-02 DIAGNOSIS — M76.52 PATELLAR TENDINITIS OF LEFT KNEE: Primary | ICD-10-CM

## 2025-09-02 PROCEDURE — 97110 THERAPEUTIC EXERCISES: CPT | Mod: GP,CQ
